# Patient Record
Sex: FEMALE | Race: WHITE | NOT HISPANIC OR LATINO | Employment: OTHER | URBAN - METROPOLITAN AREA
[De-identification: names, ages, dates, MRNs, and addresses within clinical notes are randomized per-mention and may not be internally consistent; named-entity substitution may affect disease eponyms.]

---

## 2017-02-02 ENCOUNTER — OFFICE VISIT (OUTPATIENT)
Dept: FAMILY MEDICINE CLINIC | Facility: CLINIC | Age: 62
End: 2017-02-02

## 2017-02-02 VITALS
OXYGEN SATURATION: 97 % | HEART RATE: 66 BPM | TEMPERATURE: 98.3 F | HEIGHT: 69 IN | BODY MASS INDEX: 26.96 KG/M2 | DIASTOLIC BLOOD PRESSURE: 74 MMHG | RESPIRATION RATE: 16 BRPM | WEIGHT: 182 LBS | SYSTOLIC BLOOD PRESSURE: 120 MMHG

## 2017-02-02 DIAGNOSIS — J32.0 MAXILLARY SINUSITIS, UNSPECIFIED CHRONICITY: Primary | ICD-10-CM

## 2017-02-02 PROCEDURE — 99213 OFFICE O/P EST LOW 20 MIN: CPT | Performed by: INTERNAL MEDICINE

## 2017-02-02 RX ORDER — AZITHROMYCIN 250 MG/1
TABLET, FILM COATED ORAL
Qty: 6 TABLET | Refills: 0 | Status: SHIPPED | OUTPATIENT
Start: 2017-02-02 | End: 2017-05-17

## 2017-02-03 ENCOUNTER — TELEPHONE (OUTPATIENT)
Dept: FAMILY MEDICINE CLINIC | Facility: CLINIC | Age: 62
End: 2017-02-03

## 2017-02-03 NOTE — TELEPHONE ENCOUNTER
Spoke to patient and informed her that she could come and  a sample of Asmanex.  She expressed understanding and will come by and  the inhaler today   ----- Message from Erika Rob sent at 2/3/2017 12:27 PM EST -----  Contact: PT  [phone]  PT SAW DR DING YESTERDAY FOR BRONCHITIS AND HE ASK HER ABOUT AND INHALER AND SHE DECLINED BUT NOW SAYS IS HAVING SOME WHEEZING AND WOULD LIKE TO GET AN INHALER       [facility] [address]    CALL AND LET HER KNOW [facility] HAS BEEN DONE

## 2017-02-07 PROBLEM — J32.0 MAXILLARY SINUSITIS: Status: ACTIVE | Noted: 2017-02-07

## 2017-02-07 NOTE — PROGRESS NOTES
Subjective   Juli Manzanares is a 61 y.o. female. Patient is here today for   Chief Complaint   Patient presents with   • Sinus Problem     sinus congestion and cough           Vitals:    02/02/17 1303   BP: 120/74   Pulse: 66   Resp: 16   Temp: 98.3 °F (36.8 °C)   SpO2: 97%       Past Medical History   Diagnosis Date   • Arthritis    • Asthma       Allergies   Allergen Reactions   • Bactrim [Sulfamethoxazole-Trimethoprim]    • Carbocaine [Mepivacaine Hcl]    • Novocain [Procaine]    • Penicillins       Social History     Social History   • Marital status:      Spouse name: N/A   • Number of children: N/A   • Years of education: N/A     Occupational History   • Not on file.     Social History Main Topics   • Smoking status: Never Smoker   • Smokeless tobacco: Not on file   • Alcohol use Yes   • Drug use: Not on file   • Sexual activity: Not on file     Other Topics Concern   • Not on file     Social History Narrative        Current Outpatient Prescriptions:   •  calcium citrate-vitamin d (CITRACAL) 200-250 MG-UNIT tablet tablet, Take  by mouth Daily., Disp: , Rfl:   •  Cholecalciferol (VITAMIN D3) 5000 UNITS capsule capsule, Take 5,000 Units by mouth Daily., Disp: , Rfl:   •  Multiple Vitamins-Minerals (MULTI FOR HER 50+ PO), Take  by mouth., Disp: , Rfl:   •  azithromycin (ZITHROMAX) 250 MG tablet, Take 2 tablets the first day, then 1 tablet daily for 4 days., Disp: 6 tablet, Rfl: 0     Objective     HPI Comments: She has had tender maxillary sinus pain and purulent nasal drainage for 10 days or so now.    She denies having any fevers or chills.    When this first started, approximately 10 days ago, she had a sore throat.    She denies any dyspnea or squeaking or wheezes.    Sinus Problem   Pertinent negatives include no ear pain.        Review of Systems   Constitutional: Negative.    HENT: Positive for postnasal drip. Negative for ear pain.         He has bilateral maxillary sinus tenderness and pain on  palpation.  The right is worse than the left.   Respiratory: Negative.    Cardiovascular: Negative.    Psychiatric/Behavioral: Negative.        Physical Exam   Constitutional: She is oriented to person, place, and time. She appears well-developed and well-nourished.   HENT:   Head: Normocephalic and atraumatic.   She has a little pain on palpation of the right maxillary sinus.   Pulmonary/Chest: Effort normal and breath sounds normal. No respiratory distress. She has no wheezes. She has no rales.   Neurological: She is alert and oriented to person, place, and time.   Psychiatric: She has a normal mood and affect. Her behavior is normal.   Nursing note and vitals reviewed.        Problem List Items Addressed This Visit        Respiratory    Maxillary sinusitis - Primary        I gave her Zithromax Z-Odin.  I asked her to take over-the-counter decongestion when necessary.    I asked her let me know if she failed to improve.    PLAN    No Follow-up on file.

## 2017-03-08 ENCOUNTER — APPOINTMENT (OUTPATIENT)
Dept: WOMENS IMAGING | Facility: HOSPITAL | Age: 62
End: 2017-03-08

## 2017-03-08 PROCEDURE — 77067 SCR MAMMO BI INCL CAD: CPT | Performed by: RADIOLOGY

## 2017-03-08 PROCEDURE — 77063 BREAST TOMOSYNTHESIS BI: CPT | Performed by: RADIOLOGY

## 2017-05-03 DIAGNOSIS — Z00.00 ROUTINE HEALTH MAINTENANCE: Primary | ICD-10-CM

## 2017-05-10 ENCOUNTER — CLINICAL SUPPORT (OUTPATIENT)
Dept: FAMILY MEDICINE CLINIC | Facility: CLINIC | Age: 62
End: 2017-05-10

## 2017-05-10 DIAGNOSIS — Z00.00 ROUTINE HEALTH MAINTENANCE: Primary | ICD-10-CM

## 2017-05-10 PROCEDURE — 85025 COMPLETE CBC W/AUTO DIFF WBC: CPT | Performed by: INTERNAL MEDICINE

## 2017-05-10 PROCEDURE — 71020 XR CHEST PA AND LATERAL: CPT | Performed by: INTERNAL MEDICINE

## 2017-05-10 PROCEDURE — 93000 ELECTROCARDIOGRAM COMPLETE: CPT | Performed by: INTERNAL MEDICINE

## 2017-05-11 LAB
ALBUMIN SERPL-MCNC: 4.8 G/DL (ref 3.5–5.2)
ALBUMIN/GLOB SERPL: 2.2 G/DL
ALP SERPL-CCNC: 67 U/L (ref 39–117)
ALT SERPL-CCNC: 20 U/L (ref 1–33)
APPEARANCE UR: CLEAR
AST SERPL-CCNC: 27 U/L (ref 1–32)
BILIRUB SERPL-MCNC: 0.9 MG/DL (ref 0.1–1.2)
BILIRUB UR QL STRIP: NEGATIVE
BUN SERPL-MCNC: 25 MG/DL (ref 8–23)
BUN/CREAT SERPL: 23.6 (ref 7–25)
CALCIUM SERPL-MCNC: 9.9 MG/DL (ref 8.6–10.5)
CHLORIDE SERPL-SCNC: 99 MMOL/L (ref 98–107)
CHOLEST SERPL-MCNC: 242 MG/DL (ref 0–200)
CO2 SERPL-SCNC: 27.1 MMOL/L (ref 22–29)
COLOR UR: YELLOW
CREAT SERPL-MCNC: 1.06 MG/DL (ref 0.57–1)
GLOBULIN SER CALC-MCNC: 2.2 GM/DL
GLUCOSE SERPL-MCNC: 86 MG/DL (ref 65–99)
GLUCOSE UR QL: NEGATIVE
HDLC SERPL-MCNC: 79 MG/DL (ref 40–60)
HGB UR QL STRIP: NEGATIVE
KETONES UR QL STRIP: NEGATIVE
LDLC SERPL CALC-MCNC: 149 MG/DL (ref 0–100)
LDLC/HDLC SERPL: 1.89 {RATIO}
LEUKOCYTE ESTERASE UR QL STRIP: NEGATIVE
NITRITE UR QL STRIP: NEGATIVE
PH UR STRIP: 7 [PH] (ref 5–8)
POTASSIUM SERPL-SCNC: 4.9 MMOL/L (ref 3.5–5.2)
PROT SERPL-MCNC: 7 G/DL (ref 6–8.5)
PROT UR QL STRIP: NEGATIVE
SODIUM SERPL-SCNC: 142 MMOL/L (ref 136–145)
SP GR UR: 1.01 (ref 1–1.03)
TRIGL SERPL-MCNC: 69 MG/DL (ref 0–150)
UROBILINOGEN UR STRIP-MCNC: NORMAL MG/DL
VLDLC SERPL CALC-MCNC: 13.8 MG/DL (ref 5–40)

## 2017-05-17 ENCOUNTER — OFFICE VISIT (OUTPATIENT)
Dept: FAMILY MEDICINE CLINIC | Facility: CLINIC | Age: 62
End: 2017-05-17

## 2017-05-17 VITALS
RESPIRATION RATE: 16 BRPM | HEART RATE: 67 BPM | DIASTOLIC BLOOD PRESSURE: 80 MMHG | SYSTOLIC BLOOD PRESSURE: 130 MMHG | HEIGHT: 69 IN | TEMPERATURE: 98 F | BODY MASS INDEX: 27.85 KG/M2 | OXYGEN SATURATION: 98 % | WEIGHT: 188 LBS

## 2017-05-17 DIAGNOSIS — Z12.11 ENCOUNTER FOR SCREENING COLONOSCOPY: Primary | ICD-10-CM

## 2017-05-17 DIAGNOSIS — Z00.00 WELL ADULT EXAM: ICD-10-CM

## 2017-05-17 PROCEDURE — 99396 PREV VISIT EST AGE 40-64: CPT | Performed by: INTERNAL MEDICINE

## 2017-05-17 PROCEDURE — 71020 XR CHEST PA AND LATERAL: CPT | Performed by: INTERNAL MEDICINE

## 2017-05-17 RX ORDER — MOMETASONE FUROATE 1 MG/G
CREAM TOPICAL
Refills: 0 | Status: ON HOLD | COMMUNITY
Start: 2017-02-22 | End: 2017-07-12

## 2017-05-22 ENCOUNTER — TRANSCRIBE ORDERS (OUTPATIENT)
Dept: ADMINISTRATIVE | Facility: HOSPITAL | Age: 62
End: 2017-05-22

## 2017-05-22 DIAGNOSIS — Z13.9 SCREENING: Primary | ICD-10-CM

## 2017-05-25 ENCOUNTER — HOSPITAL ENCOUNTER (OUTPATIENT)
Dept: CARDIOLOGY | Facility: HOSPITAL | Age: 62
Discharge: HOME | End: 2017-05-25
Admitting: INTERNAL MEDICINE

## 2017-05-25 VITALS
HEART RATE: 60 BPM | WEIGHT: 187 LBS | SYSTOLIC BLOOD PRESSURE: 117 MMHG | HEIGHT: 69 IN | BODY MASS INDEX: 27.7 KG/M2 | DIASTOLIC BLOOD PRESSURE: 69 MMHG

## 2017-05-25 DIAGNOSIS — Z13.9 SCREENING: ICD-10-CM

## 2017-05-25 LAB
BH CV ECHO MEAS - DIST AO DIAM: 1.53 CM
BH CV VAS BP LEFT ARM: NORMAL MMHG
BH CV VAS BP RIGHT ARM: NORMAL MMHG
BH CV XLRA MEAS - MID AO DIAM: 1.72 CM
BH CV XLRA MEAS - PAD LEFT ABI DP: 1.21
BH CV XLRA MEAS - PAD LEFT ABI PT: 1.19
BH CV XLRA MEAS - PAD LEFT ARM: 115 MMHG
BH CV XLRA MEAS - PAD LEFT LEG DP: 142 MMHG
BH CV XLRA MEAS - PAD LEFT LEG PT: 140 MMHG
BH CV XLRA MEAS - PAD RIGHT ABI DP: 1.16
BH CV XLRA MEAS - PAD RIGHT ABI PT: 1.19
BH CV XLRA MEAS - PAD RIGHT ARM: 117 MMHG
BH CV XLRA MEAS - PAD RIGHT LEG DP: 136 MMHG
BH CV XLRA MEAS - PAD RIGHT LEG PT: 140 MMHG
BH CV XLRA MEAS - PROX AO DIAM: 2.08 CM
BH CV XLRA MEAS LEFT ICA/CCA RATIO: 1.18
BH CV XLRA MEAS LEFT MID CCA PSV: NORMAL CM/SEC
BH CV XLRA MEAS LEFT MID ICA PSV: NORMAL CM/SEC
BH CV XLRA MEAS LEFT PROX ECA PSV: NORMAL CM/SEC
BH CV XLRA MEAS RIGHT ICA/CCA RATIO: 1.07
BH CV XLRA MEAS RIGHT MID CCA PSV: NORMAL CM/SEC
BH CV XLRA MEAS RIGHT MID ICA PSV: NORMAL CM/SEC
BH CV XLRA MEAS RIGHT PROX ECA PSV: NORMAL CM/SEC

## 2017-05-25 PROCEDURE — 93799 UNLISTED CV SVC/PROCEDURE: CPT

## 2017-06-06 PROBLEM — Z00.00 WELL ADULT EXAM: Status: ACTIVE | Noted: 2017-06-06

## 2017-06-06 NOTE — PROGRESS NOTES
Subjective   Juli Manzanares is a 62 y.o. female. Patient is here today for   Chief Complaint   Patient presents with   • Annual Exam     physical- pt has OBGYN           Vitals:    05/17/17 1042   BP: 130/80   Pulse: 67   Resp: 16   Temp: 98 °F (36.7 °C)   SpO2: 98%       The following portions of the patient's history were reviewed and updated as appropriate: allergies, current medications, past family history, past medical history, past social history, past surgical history and problem list.    Past Medical History:   Diagnosis Date   • Arthritis    • Asthma       Allergies   Allergen Reactions   • Bactrim [Sulfamethoxazole-Trimethoprim]    • Carbocaine [Mepivacaine Hcl]    • Novocain [Procaine]    • Penicillins       Social History     Social History   • Marital status:      Spouse name: N/A   • Number of children: N/A   • Years of education: N/A     Occupational History   • Not on file.     Social History Main Topics   • Smoking status: Never Smoker   • Smokeless tobacco: Not on file   • Alcohol use Yes   • Drug use: Not on file   • Sexual activity: Not on file     Other Topics Concern   • Not on file     Social History Narrative        Current Outpatient Prescriptions:   •  calcium citrate-vitamin d (CITRACAL) 200-250 MG-UNIT tablet tablet, Take  by mouth Daily., Disp: , Rfl:   •  Cholecalciferol (VITAMIN D3) 5000 UNITS capsule capsule, Take 5,000 Units by mouth Daily., Disp: , Rfl:   •  Loratadine (CLARITIN PO), Take  by mouth., Disp: , Rfl:   •  mometasone (ELOCON) 0.1 % cream, APPLY TO AFFECTED AREA OF FACE FOR TWO WEEKS AT A TIME, Disp: , Rfl: 0  •  Multiple Vitamins-Minerals (MULTI FOR HER 50+ PO), Take  by mouth., Disp: , Rfl:      EKG  ECG Report  Electrocardiogram was normal without any ST or T wave changes suggestive of ischemia and a normal sinus rhythm.    PA and lateral chest x-ray showed no acute or chronic disease.  Objective   HPI Comments: This pleasant lady is here for a comprehensive  physical exam.  She has no complaints.  She is gynecologist that she sees on a regular basis for pelvic Pap exam etc.     Juli Manzanares 62 y.o. female who presents for an Annual Wellness Visit.  she has a history of   Patient Active Problem List   Diagnosis   • Female pattern hair loss   • Maxillary sinusitis   • Well adult exam   .  she has been doing well with new interval problems.  Labs results discussed in detail with the patient.  Plan to update vaccines if needed today.        Lab Results (most recent)     None            Review of Systems   Constitutional: Negative.    HENT: Negative.    Eyes: Negative.    Respiratory: Negative.    Cardiovascular: Negative.    Gastrointestinal: Negative.    Endocrine: Negative.    Genitourinary: Negative.    Musculoskeletal: Negative.    Skin: Negative.    Allergic/Immunologic: Negative.    Neurological: Negative.    Hematological: Negative.    Psychiatric/Behavioral: Negative.        Physical Exam   Constitutional: She is oriented to person, place, and time. She appears well-developed and well-nourished. No distress.   Pleasant, neatly groomed, BMI 27.   HENT:   Head: Normocephalic and atraumatic.   Right Ear: External ear normal.   Left Ear: External ear normal.   Nose: Nose normal.   Mouth/Throat: Oropharynx is clear and moist.   Eyes: Conjunctivae and EOM are normal. Pupils are equal, round, and reactive to light. Right eye exhibits no discharge. Left eye exhibits no discharge. No scleral icterus.   Neck: Normal range of motion. Neck supple. No JVD present. No tracheal deviation present. No thyromegaly present.   Cardiovascular: Normal rate, regular rhythm, normal heart sounds and intact distal pulses.  Exam reveals no gallop and no friction rub.    No murmur heard.  Pulmonary/Chest: Effort normal and breath sounds normal. No stridor. No respiratory distress. She has no wheezes. She has no rales. She exhibits no tenderness.   Abdominal: Soft. Bowel sounds are normal. She  exhibits no distension and no mass. There is no tenderness. There is no rebound and no guarding. No hernia.   Genitourinary:   Genitourinary Comments: Deferred to gynecology.   Musculoskeletal: Normal range of motion. She exhibits no edema, tenderness or deformity.   Lymphadenopathy:     She has no cervical adenopathy.   Neurological: She is alert and oriented to person, place, and time. She has normal reflexes. She displays normal reflexes. No cranial nerve deficit. She exhibits normal muscle tone. Coordination normal.   Skin: Skin is warm and dry. No rash noted. She is not diaphoretic. No pallor.   She has some diffuse female pattern hair loss on the scalp.   Psychiatric: She has a normal mood and affect. Her behavior is normal. Judgment and thought content normal.   Nursing note and vitals reviewed.      ASSESSMENT       Problem List Items Addressed This Visit        Other    Well adult exam    Relevant Orders    XR Chest PA & Lateral (Completed)      Other Visit Diagnoses     Encounter for screening colonoscopy    -  Primary    Relevant Orders    Ambulatory Referral to Gastroenterology          PLAN  She enjoys excellent health.    She is a little bit overdue for screening colonoscopy and I have referred her out for that today.    I like to have her back in one year for another comprehensive physical exam.  There are no Patient Instructions on file for this visit.    No Follow-up on file.

## 2017-06-23 DIAGNOSIS — Z83.71 FAMILY HISTORY OF POLYPS IN THE COLON: Primary | ICD-10-CM

## 2017-06-23 RX ORDER — SODIUM CHLORIDE 0.9 % (FLUSH) 0.9 %
1-10 SYRINGE (ML) INJECTION AS NEEDED
Status: CANCELLED | OUTPATIENT
Start: 2017-06-23

## 2017-06-23 RX ORDER — SODIUM CHLORIDE, SODIUM LACTATE, POTASSIUM CHLORIDE, CALCIUM CHLORIDE 600; 310; 30; 20 MG/100ML; MG/100ML; MG/100ML; MG/100ML
30 INJECTION, SOLUTION INTRAVENOUS CONTINUOUS
Status: CANCELLED | OUTPATIENT
Start: 2017-06-23

## 2017-07-12 ENCOUNTER — HOSPITAL ENCOUNTER (OUTPATIENT)
Facility: HOSPITAL | Age: 62
Setting detail: HOSPITAL OUTPATIENT SURGERY
Discharge: HOME | End: 2017-07-12
Attending: INTERNAL MEDICINE | Admitting: INTERNAL MEDICINE

## 2017-07-12 ENCOUNTER — ANESTHESIA (OUTPATIENT)
Dept: GASTROENTEROLOGY | Facility: HOSPITAL | Age: 62
End: 2017-07-12

## 2017-07-12 ENCOUNTER — ANESTHESIA EVENT (OUTPATIENT)
Dept: GASTROENTEROLOGY | Facility: HOSPITAL | Age: 62
End: 2017-07-12

## 2017-07-12 VITALS
OXYGEN SATURATION: 99 % | WEIGHT: 183 LBS | BODY MASS INDEX: 27.11 KG/M2 | HEART RATE: 61 BPM | DIASTOLIC BLOOD PRESSURE: 71 MMHG | TEMPERATURE: 98.2 F | HEIGHT: 69 IN | SYSTOLIC BLOOD PRESSURE: 114 MMHG | RESPIRATION RATE: 14 BRPM

## 2017-07-12 DIAGNOSIS — Z83.71 FAMILY HISTORY OF POLYPS IN THE COLON: ICD-10-CM

## 2017-07-12 PROCEDURE — 45378 DIAGNOSTIC COLONOSCOPY: CPT | Performed by: INTERNAL MEDICINE

## 2017-07-12 PROCEDURE — 25010000002 PROPOFOL 10 MG/ML EMULSION: Performed by: ANESTHESIOLOGY

## 2017-07-12 RX ORDER — SODIUM CHLORIDE 0.9 % (FLUSH) 0.9 %
1-10 SYRINGE (ML) INJECTION AS NEEDED
Status: DISCONTINUED | OUTPATIENT
Start: 2017-07-12 | End: 2017-07-12 | Stop reason: HOSPADM

## 2017-07-12 RX ORDER — SODIUM CHLORIDE, SODIUM LACTATE, POTASSIUM CHLORIDE, CALCIUM CHLORIDE 600; 310; 30; 20 MG/100ML; MG/100ML; MG/100ML; MG/100ML
30 INJECTION, SOLUTION INTRAVENOUS CONTINUOUS
Status: DISCONTINUED | OUTPATIENT
Start: 2017-07-12 | End: 2017-07-12 | Stop reason: HOSPADM

## 2017-07-12 RX ORDER — PROPOFOL 10 MG/ML
VIAL (ML) INTRAVENOUS CONTINUOUS PRN
Status: DISCONTINUED | OUTPATIENT
Start: 2017-07-12 | End: 2017-07-12 | Stop reason: SURG

## 2017-07-12 RX ORDER — PROPOFOL 10 MG/ML
VIAL (ML) INTRAVENOUS AS NEEDED
Status: DISCONTINUED | OUTPATIENT
Start: 2017-07-12 | End: 2017-07-12 | Stop reason: SURG

## 2017-07-12 RX ADMIN — EPHEDRINE SULFATE 10 MG: 50 INJECTION INTRAMUSCULAR; INTRAVENOUS; SUBCUTANEOUS at 07:57

## 2017-07-12 RX ADMIN — PROPOFOL 50 MG: 10 INJECTION, EMULSION INTRAVENOUS at 07:39

## 2017-07-12 RX ADMIN — SODIUM CHLORIDE, POTASSIUM CHLORIDE, SODIUM LACTATE AND CALCIUM CHLORIDE: 600; 310; 30; 20 INJECTION, SOLUTION INTRAVENOUS at 07:28

## 2017-07-12 RX ADMIN — PROPOFOL 150 MG: 10 INJECTION, EMULSION INTRAVENOUS at 07:32

## 2017-07-12 RX ADMIN — PROPOFOL 300 MCG/KG/MIN: 10 INJECTION, EMULSION INTRAVENOUS at 07:32

## 2017-07-12 RX ADMIN — SODIUM CHLORIDE, POTASSIUM CHLORIDE, SODIUM LACTATE AND CALCIUM CHLORIDE 30 ML/HR: 600; 310; 30; 20 INJECTION, SOLUTION INTRAVENOUS at 07:10

## 2017-07-12 NOTE — H&P
"[facility]  Pre Procedure History & Physical    Chief Complaint:   Family history of colon polyps    HPI:   62W with PMH as below here for her second colonoscopy.  Last one was 2006 and normal.  Family history of polyps in her father, no GI malignancies.  No blood in her stool, reports a hemorrhoid.  No changes in her bowel habits.  Past Medical History:   Past Medical History:   Diagnosis Date   • Arthritis    • Asthma    • H/O colonoscopy    • Hemorrhoids        Family History:  Family History   Problem Relation Age of Onset   • Cancer Mother    • Hypertension Father    • Diabetes Father    • Kidney disease Father        Social History:   reports that she has never smoked. She does not have any smokeless tobacco history on file. She reports that she drinks alcohol.    Medications:   Prescriptions Prior to Admission   Medication Sig Dispense Refill Last Dose   • calcium citrate-vitamin d (CITRACAL) 200-250 MG-UNIT tablet tablet Take  by mouth Daily.   7/8/2017   • Cholecalciferol (VITAMIN D3) 5000 UNITS capsule capsule Take 5,000 Units by mouth Daily.   7/8/2017   • Loratadine (CLARITIN PO) Take  by mouth.   7/7/2017   • Multiple Vitamins-Minerals (MULTI FOR HER 50+ PO) Take  by mouth.   7/8/2017       Allergies:  Bactrim [sulfamethoxazole-trimethoprim]; Carbocaine [mepivacaine hcl]; Novocain [procaine]; Penicillins; and Latex    ROS:    Pertinent items are noted in HPI     Objective     Blood pressure 131/66, pulse 77, temperature 98.2 °F (36.8 °C), temperature source Oral, resp. rate 16, height 68.5\" (174 cm), weight 183 lb (83 kg), SpO2 98 %.    Physical Exam   Constitutional: Pt is oriented to person, place, and time and well-developed, well-nourished, and in no distress.   HENT:   Mouth/Throat: Oropharynx is clear and moist.   Neck: Normal range of motion. Neck supple.   Cardiovascular: Normal rate, regular rhythm and normal heart sounds.    Pulmonary/Chest: Effort normal and breath " sounds normal. No respiratory distress. No  wheezes.   Abdominal: Soft. Bowel sounds are normal.   Skin: Skin is warm and dry.   Psychiatric: Mood, memory, affect and judgment normal.     Assessment/Plan     Diagnosis:  Family history of colon polyps    Anticipated Surgical Procedure:    Colonoscopy    The risks, benefits, and alternatives of this procedure have been discussed with the patient or the responsible party- the patient understands and agrees to proceed.

## 2017-07-12 NOTE — ANESTHESIA POSTPROCEDURE EVALUATION
Patient: Juli Manzanares    Procedure Summary     Date Anesthesia Start Anesthesia Stop Room / Location    07/12/17 0728 [id number]  [facility] /  [facility]       Procedure Diagnosis Surgeon Provider    COLONOSCOPY into cecum (N/A ) Family history of polyps in the colon  (Family history of polyps in the colon [Z83.71]) MD Vu Powell MD          Anesthesia Type: MAC  Last vitals  BP      Temp      Pulse     Resp      SpO2        Post Anesthesia Care and Evaluation    Patient location during evaluation: PHASE II  Patient participation: complete - patient participated  Level of consciousness: awake and alert  Pain management: adequate  Airway patency: patent  Anesthetic complications: No anesthetic complications  PONV Status: none  Cardiovascular status: acceptable  Respiratory status: acceptable  Hydration status: acceptable

## 2017-07-12 NOTE — ANESTHESIA PREPROCEDURE EVALUATION
Anesthesia Evaluation     Patient summary reviewed and Nursing notes reviewed          Airway   Mallampati: II  TM distance: >3 FB  Neck ROM: full  no difficulty expected  Dental - normal exam     Pulmonary - normal exam   (+) asthma,   Cardiovascular - negative cardio ROS and normal exam        Neuro/Psych- negative ROS  GI/Hepatic/Renal/Endo - negative ROS     Musculoskeletal     Abdominal  - normal exam   Substance History - negative use     OB/GYN negative ob/gyn ROS         Other   (+) arthritis                                     Anesthesia Plan    ASA 2     MAC     intravenous induction   Anesthetic plan and risks discussed with patient.

## 2017-11-08 DIAGNOSIS — E78.5 HYPERLIPIDEMIA, UNSPECIFIED HYPERLIPIDEMIA TYPE: Primary | ICD-10-CM

## 2017-11-14 LAB
ALBUMIN SERPL-MCNC: 4.6 G/DL (ref 3.5–5.2)
ALBUMIN/GLOB SERPL: 2.3 G/DL
ALP SERPL-CCNC: 66 U/L (ref 39–117)
ALT SERPL-CCNC: 21 U/L (ref 1–33)
APPEARANCE UR: CLEAR
AST SERPL-CCNC: 29 U/L (ref 1–32)
BACTERIA #/AREA URNS HPF: NORMAL /HPF
BILIRUB SERPL-MCNC: 0.7 MG/DL (ref 0.1–1.2)
BILIRUB UR QL STRIP: NEGATIVE
BUN SERPL-MCNC: 21 MG/DL (ref 8–23)
BUN/CREAT SERPL: 20.2 (ref 7–25)
CALCIUM SERPL-MCNC: 9.7 MG/DL (ref 8.6–10.5)
CASTS URNS MICRO: NORMAL
CHLORIDE SERPL-SCNC: 100 MMOL/L (ref 98–107)
CHOLEST SERPL-MCNC: 204 MG/DL (ref 0–200)
CO2 SERPL-SCNC: 26.8 MMOL/L (ref 22–29)
COLOR UR: YELLOW
CREAT SERPL-MCNC: 1.04 MG/DL (ref 0.57–1)
EPI CELLS #/AREA URNS HPF: NORMAL /HPF
GFR SERPLBLD CREATININE-BSD FMLA CKD-EPI: 53 ML/MIN/1.73
GFR SERPLBLD CREATININE-BSD FMLA CKD-EPI: 65 ML/MIN/1.73
GLOBULIN SER CALC-MCNC: 2 GM/DL
GLUCOSE SERPL-MCNC: 90 MG/DL (ref 65–99)
GLUCOSE UR QL: NEGATIVE
HDLC SERPL-MCNC: 65 MG/DL (ref 40–60)
HGB UR QL STRIP: NEGATIVE
KETONES UR QL STRIP: NEGATIVE
LDLC SERPL CALC-MCNC: 125 MG/DL (ref 0–100)
LDLC/HDLC SERPL: 1.93 {RATIO}
LEUKOCYTE ESTERASE UR QL STRIP: NEGATIVE
NITRITE UR QL STRIP: NEGATIVE
PH UR STRIP: 7 [PH] (ref 5–8)
POTASSIUM SERPL-SCNC: 4.5 MMOL/L (ref 3.5–5.2)
PROT SERPL-MCNC: 6.6 G/DL (ref 6–8.5)
PROT UR QL STRIP: NEGATIVE
RBC #/AREA URNS HPF: NORMAL /HPF
SODIUM SERPL-SCNC: 140 MMOL/L (ref 136–145)
SP GR UR: 1.01 (ref 1–1.03)
TRIGL SERPL-MCNC: 68 MG/DL (ref 0–150)
UROBILINOGEN UR STRIP-MCNC: (no result) MG/DL
VLDLC SERPL CALC-MCNC: 13.6 MG/DL (ref 5–40)
WBC #/AREA URNS HPF: NORMAL /HPF

## 2017-11-28 ENCOUNTER — OFFICE VISIT (OUTPATIENT)
Dept: FAMILY MEDICINE CLINIC | Facility: CLINIC | Age: 62
End: 2017-11-28

## 2017-11-28 VITALS
BODY MASS INDEX: 27.25 KG/M2 | SYSTOLIC BLOOD PRESSURE: 126 MMHG | HEIGHT: 69 IN | HEART RATE: 75 BPM | WEIGHT: 184 LBS | DIASTOLIC BLOOD PRESSURE: 80 MMHG | RESPIRATION RATE: 16 BRPM

## 2017-11-28 DIAGNOSIS — M54.50 CHRONIC BILATERAL LOW BACK PAIN WITHOUT SCIATICA: Primary | ICD-10-CM

## 2017-11-28 DIAGNOSIS — G89.29 CHRONIC BILATERAL LOW BACK PAIN WITHOUT SCIATICA: Primary | ICD-10-CM

## 2017-11-28 PROCEDURE — 99213 OFFICE O/P EST LOW 20 MIN: CPT | Performed by: INTERNAL MEDICINE

## 2017-11-28 NOTE — PROGRESS NOTES
Subjective   Juli Manzanares is a 62 y.o. female. Patient is here today for   Chief Complaint   Patient presents with   • Hyperlipidemia          Vitals:    11/28/17 0932   BP: 126/80   Pulse: 75   Resp: 16       Past Medical History:   Diagnosis Date   • Arthritis    • Asthma    • H/O colonoscopy    • Hemorrhoids       Allergies   Allergen Reactions   • Bactrim [Sulfamethoxazole-Trimethoprim]    • Carbocaine [Mepivacaine Hcl]    • Novocain [Procaine]    • Penicillins    • Latex Rash      Social History     Social History   • Marital status:      Spouse name: N/A   • Number of children: N/A   • Years of education: N/A     Occupational History   • Not on file.     Social History Main Topics   • Smoking status: Never Smoker   • Smokeless tobacco: Not on file   • Alcohol use Yes      Comment: rarely   • Drug use: Not on file   • Sexual activity: Not on file     Other Topics Concern   • Not on file     Social History Narrative        Current Outpatient Prescriptions:   •  calcium citrate-vitamin d (CITRACAL) 200-250 MG-UNIT tablet tablet, Take  by mouth Daily., Disp: , Rfl:   •  Cholecalciferol (VITAMIN D3) 5000 UNITS capsule capsule, Take 5,000 Units by mouth Daily., Disp: , Rfl:   •  Loratadine (CLARITIN PO), Take  by mouth., Disp: , Rfl:   •  Multiple Vitamins-Minerals (MULTI FOR HER 50+ PO), Take  by mouth., Disp: , Rfl:      Objective     HPI Comments: She is here to follow-up her hypercholesterolemia which she has been managing with judicious changes in her diet to reduce her level of fat intake.  She is eating more vegetables as well.    She tells me that she feels well.    She wonders what blood type she has.  I think that would be worth knowing.    She has had back pain for many years.  She is to see Dr. Em intermittently for this issue.  She is going to the gym 3 times a week.  The pain that she has and her back is in the sacral area and low lumbar area.  This pain is bilateral.  There is no  radiation of this pain.  Going up stairs makes it worse.       Review of Systems   Constitutional: Negative.    HENT: Negative.    Cardiovascular: Negative.    Musculoskeletal:        He complains of back pain as described in the history of present illness   Psychiatric/Behavioral: Negative.        Physical Exam   Constitutional: She is oriented to person, place, and time. She appears well-developed.   Pleasant, neatly groomed, BMI 27.   HENT:   Head: Normocephalic and atraumatic.   Pulmonary/Chest: Effort normal.   Musculoskeletal:   She has bilateral lumbosacral spine pain.  There is a little bit of pain on palpation of these areas.   Neurological: She is alert and oriented to person, place, and time.   Psychiatric: She has a normal mood and affect. Her behavior is normal.   Nursing note and vitals reviewed.        Problem List Items Addressed This Visit        Nervous and Auditory    Chronic low back pain - Primary    Relevant Orders    XR Spine Lumbar 4+ View            PLAN    I'm going to get a lumbar spine x-ray regarding her chronic lumbosacral spine pain.  That reveals no contraindication, she may benefit from a physical therapy evaluation and treatment.    She has had elevated cholesterol past but not now since she is made changes in her diet.    She ought follow-up for a comprehensive physical examination once yearly.  Check blood type next time  No Follow-up on file.

## 2017-11-29 ENCOUNTER — TELEPHONE (OUTPATIENT)
Dept: FAMILY MEDICINE CLINIC | Facility: CLINIC | Age: 62
End: 2017-11-29

## 2017-11-29 NOTE — TELEPHONE ENCOUNTER
Spoke to patient and informed her that if she would like she could schedule to have a physical done in May since that was when she had her last one   ----- Message from Liliya Bullock MA sent at 11/28/2017  9:53 AM EST -----  Contact: PT  PT WANTS TO KNOW IF SHE NEEDS TO DO A FULL PHYSICAL IN A YEAR OR DO 6 MONTH LABS AND A FU APPT PT CAN BE REACHED -[phone]

## 2017-12-19 ENCOUNTER — HOSPITAL ENCOUNTER (OUTPATIENT)
Dept: GENERAL RADIOLOGY | Facility: HOSPITAL | Age: 62
Discharge: HOME | End: 2017-12-19
Admitting: INTERNAL MEDICINE

## 2017-12-19 PROCEDURE — 72110 X-RAY EXAM L-2 SPINE 4/>VWS: CPT

## 2018-03-21 ENCOUNTER — APPOINTMENT (OUTPATIENT)
Dept: WOMENS IMAGING | Facility: HOSPITAL | Age: 63
End: 2018-03-21

## 2018-03-21 PROCEDURE — 77067 SCR MAMMO BI INCL CAD: CPT | Performed by: RADIOLOGY

## 2018-03-21 PROCEDURE — 77063 BREAST TOMOSYNTHESIS BI: CPT | Performed by: RADIOLOGY

## 2018-04-30 ENCOUNTER — OFFICE VISIT (OUTPATIENT)
Dept: FAMILY MEDICINE CLINIC | Facility: CLINIC | Age: 63
End: 2018-04-30

## 2018-04-30 VITALS
DIASTOLIC BLOOD PRESSURE: 80 MMHG | WEIGHT: 182.2 LBS | TEMPERATURE: 98.5 F | HEART RATE: 67 BPM | BODY MASS INDEX: 26.98 KG/M2 | HEIGHT: 69 IN | OXYGEN SATURATION: 100 % | SYSTOLIC BLOOD PRESSURE: 112 MMHG | RESPIRATION RATE: 16 BRPM

## 2018-04-30 DIAGNOSIS — G89.29 CHRONIC BILATERAL LOW BACK PAIN WITHOUT SCIATICA: Primary | ICD-10-CM

## 2018-04-30 DIAGNOSIS — M54.50 CHRONIC BILATERAL LOW BACK PAIN WITHOUT SCIATICA: Primary | ICD-10-CM

## 2018-04-30 PROCEDURE — 99213 OFFICE O/P EST LOW 20 MIN: CPT | Performed by: INTERNAL MEDICINE

## 2018-04-30 NOTE — PROGRESS NOTES
Subjective   Juli Manzanares is a 62 y.o. female. Patient is here today for   Chief Complaint   Patient presents with   • Follow-up     xr  spine           Vitals:    04/30/18 0913   BP: 112/80   Pulse: 67   Resp: 16   Temp: 98.5 °F (36.9 °C)   SpO2: 100%       Past Medical History:   Diagnosis Date   • Arthritis    • Asthma    • H/O colonoscopy    • Hemorrhoids       Allergies   Allergen Reactions   • Bactrim [Sulfamethoxazole-Trimethoprim]    • Carbocaine [Mepivacaine Hcl]    • Novocain [Procaine]    • Penicillins    • Latex Rash      Social History     Social History   • Marital status:      Spouse name: N/A   • Number of children: N/A   • Years of education: N/A     Occupational History   • Not on file.     Social History Main Topics   • Smoking status: Never Smoker   • Smokeless tobacco: Never Used   • Alcohol use Yes      Comment: rarely   • Drug use: Unknown   • Sexual activity: Not on file     Other Topics Concern   • Not on file     Social History Narrative   • No narrative on file        Current Outpatient Prescriptions:   •  calcium citrate-vitamin d (CITRACAL) 200-250 MG-UNIT tablet tablet, Take  by mouth Daily., Disp: , Rfl:   •  Cholecalciferol (VITAMIN D3) 5000 UNITS capsule capsule, Take 5,000 Units by mouth Daily., Disp: , Rfl:   •  Loratadine (CLARITIN PO), Take  by mouth., Disp: , Rfl:   •  Multiple Vitamins-Minerals (MULTI FOR HER 50+ PO), Take  by mouth., Disp: , Rfl:      Objective     She continues to complain of lumbar spine pain.  She does not have any radicular symptoms.    It's worse after she's been sitting spell.  It does hurt her occasionally when she is rolling over in bed.  Over the last 18 years, she has had some lumbar spine pain.  It's a bit worse now than it used to be.    She had an x-ray of her lumbar spine on December 19.  It showed some mild level scoliosis, some spurring, some mild spondylosis.  Degenerative Disc Changes.         Review of Systems   Constitutional:  Negative.    HENT: Negative.    Respiratory: Negative.    Cardiovascular: Negative.    Musculoskeletal: Positive for back pain.   Psychiatric/Behavioral: Negative.        Physical Exam   Constitutional: She is oriented to person, place, and time. She appears well-developed and well-nourished.   HENT:   Head: Normocephalic and atraumatic.   Pulmonary/Chest: Effort normal.   Neurological: She is alert and oriented to person, place, and time.   Skin: Skin is warm and dry.   Psychiatric: She has a normal mood and affect. Her behavior is normal.   Nursing note and vitals reviewed.        Problem List Items Addressed This Visit        Nervous and Auditory    Chronic low back pain - Primary    Relevant Orders    Ambulatory Referral to Physical Therapy      Other Visit Diagnoses    None.           PLAN  I think she may benefit from physical therapy.  I've referred her today.    She does not feel that she wants to try any anti-inflammatory medications at this time.    Follow-up in about 6 months.  No Follow-up on file.

## 2018-10-25 DIAGNOSIS — E78.5 HYPERLIPIDEMIA, UNSPECIFIED HYPERLIPIDEMIA TYPE: Primary | ICD-10-CM

## 2018-10-29 DIAGNOSIS — E78.5 HYPERLIPIDEMIA, UNSPECIFIED HYPERLIPIDEMIA TYPE: Primary | ICD-10-CM

## 2018-10-29 LAB
ALBUMIN SERPL-MCNC: 4.7 G/DL (ref 3.5–5.2)
ALBUMIN/GLOB SERPL: 2.2 G/DL
ALP SERPL-CCNC: 67 U/L (ref 39–117)
ALT SERPL-CCNC: 19 U/L (ref 1–33)
AST SERPL-CCNC: 23 U/L (ref 1–32)
BASOPHILS # BLD AUTO: 0.01 10*3/MM3 (ref 0–0.2)
BASOPHILS NFR BLD AUTO: 0.3 % (ref 0–1.5)
BILIRUB SERPL-MCNC: 0.8 MG/DL (ref 0.1–1.2)
BUN SERPL-MCNC: 18 MG/DL (ref 8–23)
BUN/CREAT SERPL: 19.1 (ref 7–25)
CALCIUM SERPL-MCNC: 9.8 MG/DL (ref 8.6–10.5)
CHLORIDE SERPL-SCNC: 102 MMOL/L (ref 98–107)
CHOLEST SERPL-MCNC: 211 MG/DL (ref 0–200)
CO2 SERPL-SCNC: 27.2 MMOL/L (ref 22–29)
CREAT SERPL-MCNC: 0.94 MG/DL (ref 0.57–1)
EOSINOPHIL # BLD AUTO: 0.11 10*3/MM3 (ref 0–0.7)
EOSINOPHIL NFR BLD AUTO: 3.2 % (ref 0.3–6.2)
ERYTHROCYTE [DISTWIDTH] IN BLOOD BY AUTOMATED COUNT: 13.8 % (ref 11.7–13)
GLOBULIN SER CALC-MCNC: 2.1 GM/DL
GLUCOSE SERPL-MCNC: 97 MG/DL (ref 65–99)
HCT VFR BLD AUTO: 43 % (ref 35.6–45.5)
HDLC SERPL-MCNC: 67 MG/DL (ref 40–60)
HGB BLD-MCNC: 13.2 G/DL (ref 11.9–15.5)
IMM GRANULOCYTES # BLD: 0 10*3/MM3 (ref 0–0.03)
IMM GRANULOCYTES NFR BLD: 0 % (ref 0–0.5)
LDLC SERPL CALC-MCNC: 129 MG/DL (ref 0–100)
LDLC/HDLC SERPL: 1.92 {RATIO}
LYMPHOCYTES # BLD AUTO: 1.11 10*3/MM3 (ref 0.9–4.8)
LYMPHOCYTES NFR BLD AUTO: 32.6 % (ref 19.6–45.3)
MCH RBC QN AUTO: 27 PG (ref 26.9–32)
MCHC RBC AUTO-ENTMCNC: 30.7 G/DL (ref 32.4–36.3)
MCV RBC AUTO: 87.9 FL (ref 80.5–98.2)
MONOCYTES # BLD AUTO: 0.62 10*3/MM3 (ref 0.2–1.2)
MONOCYTES NFR BLD AUTO: 18.2 % (ref 5–12)
NEUTROPHILS # BLD AUTO: 1.55 10*3/MM3 (ref 1.9–8.1)
NEUTROPHILS NFR BLD AUTO: 45.7 % (ref 42.7–76)
PLATELET # BLD AUTO: 194 10*3/MM3 (ref 140–500)
POTASSIUM SERPL-SCNC: 4.3 MMOL/L (ref 3.5–5.2)
PROT SERPL-MCNC: 6.8 G/DL (ref 6–8.5)
RBC # BLD AUTO: 4.89 10*6/MM3 (ref 3.9–5.2)
SODIUM SERPL-SCNC: 142 MMOL/L (ref 136–145)
TRIGL SERPL-MCNC: 77 MG/DL (ref 0–150)
VLDLC SERPL CALC-MCNC: 15.4 MG/DL (ref 5–40)
WBC # BLD AUTO: 3.4 10*3/MM3 (ref 4.5–10.7)

## 2018-11-05 ENCOUNTER — OFFICE VISIT (OUTPATIENT)
Dept: FAMILY MEDICINE CLINIC | Facility: CLINIC | Age: 63
End: 2018-11-05

## 2018-11-05 VITALS
DIASTOLIC BLOOD PRESSURE: 72 MMHG | SYSTOLIC BLOOD PRESSURE: 124 MMHG | WEIGHT: 180.4 LBS | HEIGHT: 69 IN | HEART RATE: 67 BPM | OXYGEN SATURATION: 99 % | RESPIRATION RATE: 16 BRPM | BODY MASS INDEX: 26.72 KG/M2

## 2018-11-05 DIAGNOSIS — Z23 NEED FOR VACCINATION: Primary | ICD-10-CM

## 2018-11-05 DIAGNOSIS — E78.00 HYPERCHOLESTEROLEMIA: ICD-10-CM

## 2018-11-05 PROCEDURE — 99213 OFFICE O/P EST LOW 20 MIN: CPT | Performed by: INTERNAL MEDICINE

## 2018-11-05 PROCEDURE — 90670 PCV13 VACCINE IM: CPT | Performed by: INTERNAL MEDICINE

## 2018-11-05 PROCEDURE — G0009 ADMIN PNEUMOCOCCAL VACCINE: HCPCS | Performed by: INTERNAL MEDICINE

## 2018-11-05 RX ORDER — MOMETASONE FUROATE 1 MG/G
CREAM TOPICAL AS NEEDED
Refills: 1 | COMMUNITY
Start: 2018-08-10 | End: 2022-07-18

## 2018-11-05 NOTE — PROGRESS NOTES
Subjective   Juli Manzanares is a 63 y.o. female. Patient is here today for   Chief Complaint   Patient presents with   • Follow-up     HLD           Vitals:    11/05/18 0912   BP: 124/72   Pulse: 67   Resp: 16   SpO2: 99%       Past Medical History:   Diagnosis Date   • Arthritis    • Asthma    • H/O colonoscopy    • Hemorrhoids       Allergies   Allergen Reactions   • Bactrim [Sulfamethoxazole-Trimethoprim]    • Carbocaine [Mepivacaine Hcl]    • Novocain [Procaine]    • Penicillins    • Latex Rash      Social History     Social History   • Marital status:      Spouse name: N/A   • Number of children: N/A   • Years of education: N/A     Occupational History   • Not on file.     Social History Main Topics   • Smoking status: Never Smoker   • Smokeless tobacco: Never Used   • Alcohol use Yes      Comment: rarely   • Drug use: Unknown   • Sexual activity: Not on file     Other Topics Concern   • Not on file     Social History Narrative   • No narrative on file        Current Outpatient Prescriptions:   •  calcium citrate-vitamin d (CITRACAL) 200-250 MG-UNIT tablet tablet, Take  by mouth Daily., Disp: , Rfl:   •  Cholecalciferol (VITAMIN D3) 5000 UNITS capsule capsule, Take 5,000 Units by mouth Daily., Disp: , Rfl:   •  Loratadine (CLARITIN PO), Take  by mouth., Disp: , Rfl:   •  mometasone (ELOCON) 0.1 % cream, APPLY TO AFFECTED AREA OF FACE AS NEEDED FOR RASH FOR UP TO 2 WEEKS, Disp: , Rfl: 1  •  Multiple Vitamins-Minerals (MULTI FOR HER 50+ PO), Take  by mouth., Disp: , Rfl:      Objective     She is here to follow-up on some lab work done last week.    She had a colonoscopy earlier this year.    She had vascular screening in 2017 which showed no plaque.    She is up-to-date with flu shot.    She just turned 63 she needs a pneumonia shot.    She has no complaints.         Review of Systems   HENT: Negative.    Respiratory: Negative.    Cardiovascular: Negative.    Musculoskeletal: Negative.     Psychiatric/Behavioral: Negative.        Physical Exam   Constitutional: She is oriented to person, place, and time. She appears well-developed and well-nourished.   HENT:   Head: Normocephalic and atraumatic.   Pulmonary/Chest: Effort normal.   Neurological: She is alert and oriented to person, place, and time.   Psychiatric: She has a normal mood and affect. Her behavior is normal.   Nursing note and vitals reviewed.        Problem List Items Addressed This Visit        Cardiovascular and Mediastinum    Hypercholesterolemia       Other    Need for vaccination - Primary    Relevant Orders    Pneumococcal Conjugate Vaccine 13-Valent All (PCV13) (Completed)            PLAN  Cerebral vascular screening.    She should follow-up for Medicare wellness visit once yearly.    I asked her to follow-up in 6 months.  No Follow-up on file.

## 2018-11-06 PROBLEM — E78.00 HYPERCHOLESTEROLEMIA: Status: ACTIVE | Noted: 2018-11-06

## 2018-11-06 PROBLEM — Z23 NEED FOR VACCINATION: Status: ACTIVE | Noted: 2018-11-06

## 2019-04-03 ENCOUNTER — APPOINTMENT (OUTPATIENT)
Dept: WOMENS IMAGING | Facility: HOSPITAL | Age: 64
End: 2019-04-03

## 2019-04-03 PROCEDURE — 77063 BREAST TOMOSYNTHESIS BI: CPT | Performed by: RADIOLOGY

## 2019-04-03 PROCEDURE — 77067 SCR MAMMO BI INCL CAD: CPT | Performed by: RADIOLOGY

## 2019-05-03 DIAGNOSIS — Z11.59 NEED FOR HEPATITIS C SCREENING TEST: ICD-10-CM

## 2019-05-03 DIAGNOSIS — E78.00 HYPERCHOLESTEROLEMIA: Primary | ICD-10-CM

## 2019-05-08 LAB
ALBUMIN SERPL-MCNC: 4.7 G/DL (ref 3.5–5.2)
ALBUMIN/GLOB SERPL: 2.4 G/DL
ALP SERPL-CCNC: 69 U/L (ref 39–117)
ALT SERPL-CCNC: 19 U/L (ref 1–33)
AST SERPL-CCNC: 26 U/L (ref 1–32)
BASOPHILS # BLD AUTO: 0.02 10*3/MM3 (ref 0–0.2)
BASOPHILS NFR BLD AUTO: 0.6 % (ref 0–1.5)
BILIRUB SERPL-MCNC: 0.9 MG/DL (ref 0.2–1.2)
BUN SERPL-MCNC: 19 MG/DL (ref 8–23)
BUN/CREAT SERPL: 21.3 (ref 7–25)
CALCIUM SERPL-MCNC: 10.2 MG/DL (ref 8.6–10.5)
CHLORIDE SERPL-SCNC: 99 MMOL/L (ref 98–107)
CHOLEST SERPL-MCNC: 203 MG/DL (ref 0–200)
CO2 SERPL-SCNC: 26.4 MMOL/L (ref 22–29)
CREAT SERPL-MCNC: 0.89 MG/DL (ref 0.57–1)
EOSINOPHIL # BLD AUTO: 0.22 10*3/MM3 (ref 0–0.4)
EOSINOPHIL NFR BLD AUTO: 6.9 % (ref 0.3–6.2)
ERYTHROCYTE [DISTWIDTH] IN BLOOD BY AUTOMATED COUNT: 14.2 % (ref 12.3–15.4)
GLOBULIN SER CALC-MCNC: 2 GM/DL
GLUCOSE SERPL-MCNC: 85 MG/DL (ref 65–99)
HCT VFR BLD AUTO: 42.2 % (ref 34–46.6)
HCV AB S/CO SERPL IA: <0.1 S/CO RATIO (ref 0–0.9)
HCV AB SERPL QL IA: NORMAL
HDLC SERPL-MCNC: 75 MG/DL (ref 40–60)
HGB BLD-MCNC: 13.2 G/DL (ref 12–15.9)
IMM GRANULOCYTES # BLD AUTO: 0 10*3/MM3 (ref 0–0.05)
IMM GRANULOCYTES NFR BLD AUTO: 0 % (ref 0–0.5)
LDLC SERPL CALC-MCNC: 119 MG/DL (ref 0–100)
LDLC/HDLC SERPL: 1.59 {RATIO}
LYMPHOCYTES # BLD AUTO: 1.12 10*3/MM3 (ref 0.7–3.1)
LYMPHOCYTES NFR BLD AUTO: 35 % (ref 19.6–45.3)
MCH RBC QN AUTO: 28 PG (ref 26.6–33)
MCHC RBC AUTO-ENTMCNC: 31.3 G/DL (ref 31.5–35.7)
MCV RBC AUTO: 89.4 FL (ref 79–97)
MONOCYTES # BLD AUTO: 0.51 10*3/MM3 (ref 0.1–0.9)
MONOCYTES NFR BLD AUTO: 15.9 % (ref 5–12)
NEUTROPHILS # BLD AUTO: 1.33 10*3/MM3 (ref 1.7–7)
NEUTROPHILS NFR BLD AUTO: 41.6 % (ref 42.7–76)
NRBC BLD AUTO-RTO: 0 /100 WBC (ref 0–0.2)
PLATELET # BLD AUTO: 188 10*3/MM3 (ref 140–450)
POTASSIUM SERPL-SCNC: 4.2 MMOL/L (ref 3.5–5.2)
PROT SERPL-MCNC: 6.7 G/DL (ref 6–8.5)
RBC # BLD AUTO: 4.72 10*6/MM3 (ref 3.77–5.28)
SODIUM SERPL-SCNC: 140 MMOL/L (ref 136–145)
TRIGL SERPL-MCNC: 45 MG/DL (ref 0–150)
VLDLC SERPL CALC-MCNC: 9 MG/DL
WBC # BLD AUTO: 3.2 10*3/MM3 (ref 3.4–10.8)

## 2019-05-14 ENCOUNTER — OFFICE VISIT (OUTPATIENT)
Dept: FAMILY MEDICINE CLINIC | Facility: CLINIC | Age: 64
End: 2019-05-14

## 2019-05-14 VITALS
RESPIRATION RATE: 16 BRPM | BODY MASS INDEX: 25.86 KG/M2 | WEIGHT: 174.6 LBS | HEART RATE: 62 BPM | TEMPERATURE: 98.1 F | HEIGHT: 69 IN | SYSTOLIC BLOOD PRESSURE: 120 MMHG | DIASTOLIC BLOOD PRESSURE: 70 MMHG | OXYGEN SATURATION: 99 %

## 2019-05-14 DIAGNOSIS — E78.00 HYPERCHOLESTEROLEMIA: Primary | ICD-10-CM

## 2019-05-14 DIAGNOSIS — D72.810 LYMPHOCYTOPENIA: ICD-10-CM

## 2019-05-14 PROCEDURE — 99213 OFFICE O/P EST LOW 20 MIN: CPT | Performed by: INTERNAL MEDICINE

## 2019-05-14 NOTE — PROGRESS NOTES
Subjective   Juli Manzanares is a 63 y.o. female. Patient is here today for   Chief Complaint   Patient presents with   • Follow-up     HYPERCHOLESTERLEMIA          Vitals:    05/14/19 0831   BP: 120/70   Pulse: 62   Resp: 16   Temp: 98.1 °F (36.7 °C)   SpO2: 99%       Past Medical History:   Diagnosis Date   • Arthritis    • Asthma    • H/O colonoscopy    • Hemorrhoids       Allergies   Allergen Reactions   • Bactrim [Sulfamethoxazole-Trimethoprim]    • Carbocaine [Mepivacaine Hcl]    • Novocain [Procaine]    • Penicillins    • Latex Rash      Social History     Socioeconomic History   • Marital status:      Spouse name: Not on file   • Number of children: Not on file   • Years of education: Not on file   • Highest education level: Not on file   Tobacco Use   • Smoking status: Never Smoker   • Smokeless tobacco: Never Used   Substance and Sexual Activity   • Alcohol use: Yes     Comment: rarely        Current Outpatient Medications:   •  calcium citrate-vitamin d (CITRACAL) 200-250 MG-UNIT tablet tablet, Take  by mouth Daily., Disp: , Rfl:   •  Cholecalciferol (VITAMIN D3) 5000 UNITS capsule capsule, Take 5,000 Units by mouth Daily., Disp: , Rfl:   •  Loratadine (CLARITIN PO), Take  by mouth., Disp: , Rfl:   •  mometasone (ELOCON) 0.1 % cream, APPLY TO AFFECTED AREA OF FACE AS NEEDED FOR RASH FOR UP TO 2 WEEKS, Disp: , Rfl: 1  •  Multiple Vitamins-Minerals (MULTI FOR HER 50+ PO), Take  by mouth., Disp: , Rfl:      Objective     Here today for follow-up on hypercholesterolemia.    She has no complaints.         Review of Systems   Constitutional: Negative.    HENT: Negative.    Respiratory: Negative.    Cardiovascular: Negative.    Musculoskeletal: Negative.    Psychiatric/Behavioral: Negative.        Physical Exam   Constitutional: She is oriented to person, place, and time. She appears well-developed and well-nourished.   Pleasant, neatly groomed, in no distress, BMI 26.2.   HENT:   Head: Normocephalic and  atraumatic.   Cardiovascular: Normal rate, regular rhythm and normal heart sounds. Exam reveals no friction rub.   No murmur heard.  Pulmonary/Chest: Effort normal and breath sounds normal. No stridor. No respiratory distress. She has no wheezes.   Neurological: She is alert and oriented to person, place, and time.   Psychiatric: She has a normal mood and affect. Her behavior is normal.   Nursing note and vitals reviewed.        Problem List Items Addressed This Visit        Cardiovascular and Mediastinum    Hypercholesterolemia - Primary       Immune and Lymphatic    Lymphocytopenia            PLAN  Her hypercholesterolemia is well controlled on diet alone.    Weight.  Of encouraged her to do her best to lose weight via regular aerobic activity and decrease caloric intake.    She has a mild, persistent leukopenia.  Like her back in about 6 months.  If this persists, I would refer her to hematology for their evaluation.    Follow-up for a Medicare wellness visit once yearly.  No Follow-up on file.

## 2019-11-04 DIAGNOSIS — D72.810 LYMPHOCYTOPENIA: ICD-10-CM

## 2019-11-04 DIAGNOSIS — E78.00 HYPERCHOLESTEROLEMIA: Primary | ICD-10-CM

## 2019-11-06 LAB
ALBUMIN SERPL-MCNC: 4.6 G/DL (ref 3.5–5.2)
ALBUMIN/GLOB SERPL: 2.1 G/DL
ALP SERPL-CCNC: 60 U/L (ref 39–117)
ALT SERPL-CCNC: 19 U/L (ref 1–33)
AST SERPL-CCNC: 28 U/L (ref 1–32)
BASOPHILS # BLD AUTO: 0.02 10*3/MM3 (ref 0–0.2)
BASOPHILS NFR BLD AUTO: 0.7 % (ref 0–1.5)
BILIRUB SERPL-MCNC: 0.9 MG/DL (ref 0.2–1.2)
BUN SERPL-MCNC: 20 MG/DL (ref 8–23)
BUN/CREAT SERPL: 20.2 (ref 7–25)
CALCIUM SERPL-MCNC: 9.7 MG/DL (ref 8.6–10.5)
CHLORIDE SERPL-SCNC: 102 MMOL/L (ref 98–107)
CHOLEST SERPL-MCNC: 215 MG/DL (ref 0–200)
CO2 SERPL-SCNC: 26.3 MMOL/L (ref 22–29)
CREAT SERPL-MCNC: 0.99 MG/DL (ref 0.57–1)
EOSINOPHIL # BLD AUTO: 0.14 10*3/MM3 (ref 0–0.4)
EOSINOPHIL NFR BLD AUTO: 4.8 % (ref 0.3–6.2)
ERYTHROCYTE [DISTWIDTH] IN BLOOD BY AUTOMATED COUNT: 13.2 % (ref 12.3–15.4)
GLOBULIN SER CALC-MCNC: 2.2 GM/DL
GLUCOSE SERPL-MCNC: 95 MG/DL (ref 65–99)
HCT VFR BLD AUTO: 41.7 % (ref 34–46.6)
HDLC SERPL-MCNC: 75 MG/DL (ref 40–60)
HGB BLD-MCNC: 13.4 G/DL (ref 12–15.9)
IMM GRANULOCYTES # BLD AUTO: 0.01 10*3/MM3 (ref 0–0.05)
IMM GRANULOCYTES NFR BLD AUTO: 0.3 % (ref 0–0.5)
LDLC SERPL CALC-MCNC: 125 MG/DL (ref 0–100)
LDLC/HDLC SERPL: 1.67 {RATIO}
LYMPHOCYTES # BLD AUTO: 1.06 10*3/MM3 (ref 0.7–3.1)
LYMPHOCYTES NFR BLD AUTO: 36.6 % (ref 19.6–45.3)
MCH RBC QN AUTO: 27.9 PG (ref 26.6–33)
MCHC RBC AUTO-ENTMCNC: 32.1 G/DL (ref 31.5–35.7)
MCV RBC AUTO: 86.9 FL (ref 79–97)
MONOCYTES # BLD AUTO: 0.4 10*3/MM3 (ref 0.1–0.9)
MONOCYTES NFR BLD AUTO: 13.8 % (ref 5–12)
NEUTROPHILS # BLD AUTO: 1.27 10*3/MM3 (ref 1.7–7)
NEUTROPHILS NFR BLD AUTO: 43.8 % (ref 42.7–76)
NRBC BLD AUTO-RTO: 0 /100 WBC (ref 0–0.2)
PLATELET # BLD AUTO: 171 10*3/MM3 (ref 140–450)
POTASSIUM SERPL-SCNC: 5.1 MMOL/L (ref 3.5–5.2)
PROT SERPL-MCNC: 6.8 G/DL (ref 6–8.5)
RBC # BLD AUTO: 4.8 10*6/MM3 (ref 3.77–5.28)
SODIUM SERPL-SCNC: 142 MMOL/L (ref 136–145)
TRIGL SERPL-MCNC: 73 MG/DL (ref 0–150)
VLDLC SERPL CALC-MCNC: 14.6 MG/DL (ref 5–40)
WBC # BLD AUTO: 2.9 10*3/MM3 (ref 3.4–10.8)

## 2019-12-06 ENCOUNTER — OFFICE VISIT (OUTPATIENT)
Dept: FAMILY MEDICINE CLINIC | Facility: CLINIC | Age: 64
End: 2019-12-06

## 2019-12-06 VITALS
WEIGHT: 181.2 LBS | OXYGEN SATURATION: 98 % | SYSTOLIC BLOOD PRESSURE: 104 MMHG | HEART RATE: 58 BPM | RESPIRATION RATE: 18 BRPM | BODY MASS INDEX: 26.84 KG/M2 | DIASTOLIC BLOOD PRESSURE: 68 MMHG | HEIGHT: 69 IN | TEMPERATURE: 97.7 F

## 2019-12-06 DIAGNOSIS — Z23 NEED FOR PNEUMOCOCCAL VACCINATION: ICD-10-CM

## 2019-12-06 DIAGNOSIS — E78.00 HYPERCHOLESTEROLEMIA: ICD-10-CM

## 2019-12-06 DIAGNOSIS — D72.810 LYMPHOCYTOPENIA: Primary | ICD-10-CM

## 2019-12-06 PROCEDURE — 90732 PPSV23 VACC 2 YRS+ SUBQ/IM: CPT | Performed by: INTERNAL MEDICINE

## 2019-12-06 PROCEDURE — G0009 ADMIN PNEUMOCOCCAL VACCINE: HCPCS | Performed by: INTERNAL MEDICINE

## 2019-12-06 PROCEDURE — 99213 OFFICE O/P EST LOW 20 MIN: CPT | Performed by: INTERNAL MEDICINE

## 2019-12-06 NOTE — PROGRESS NOTES
Subjective   Juli Manzanares is a 64 y.o. female. Patient is here today for   Chief Complaint   Patient presents with   • Hyperlipidemia          Vitals:    12/06/19 0810   BP: 104/68   Pulse: 58   Resp: 18   Temp: 97.7 °F (36.5 °C)   SpO2: 98%     Body mass index is 27.15 kg/m².      Past Medical History:   Diagnosis Date   • Arthritis    • Asthma    • H/O colonoscopy    • Hemorrhoids       Allergies   Allergen Reactions   • Bactrim [Sulfamethoxazole-Trimethoprim]    • Carbocaine [Mepivacaine Hcl]    • Novocain [Procaine]    • Penicillins    • Latex Rash      Social History     Socioeconomic History   • Marital status:      Spouse name: Not on file   • Number of children: Not on file   • Years of education: Not on file   • Highest education level: Not on file   Tobacco Use   • Smoking status: Never Smoker   • Smokeless tobacco: Never Used   Substance and Sexual Activity   • Alcohol use: Yes     Comment: rarely        Current Outpatient Medications:   •  calcium citrate-vitamin d (CITRACAL) 200-250 MG-UNIT tablet tablet, Take  by mouth Daily., Disp: , Rfl:   •  Cholecalciferol (VITAMIN D3) 5000 UNITS capsule capsule, Take 5,000 Units by mouth Daily., Disp: , Rfl:   •  Loratadine (CLARITIN PO), Take  by mouth., Disp: , Rfl:   •  mometasone (ELOCON) 0.1 % cream, APPLY TO AFFECTED AREA OF FACE AS NEEDED FOR RASH FOR UP TO 2 WEEKS, Disp: , Rfl: 1  •  Multiple Vitamins-Minerals (MULTI FOR HER 50+ PO), Take  by mouth., Disp: , Rfl:      Objective      She is here today to follow-up on Labs done last week.    She has no complaints.         Review of Systems   Constitutional: Negative.    HENT: Negative.    Respiratory: Negative.    Cardiovascular: Negative.    Musculoskeletal: Negative.    Psychiatric/Behavioral: Negative.        Physical Exam   Constitutional: She is oriented to person, place, and time. She appears well-developed and well-nourished.   HENT:   Head: Normocephalic and atraumatic.   Pulmonary/Chest:  Effort normal.   Neurological: She is alert and oriented to person, place, and time.   Psychiatric: She has a normal mood and affect. Her behavior is normal.   Nursing note and vitals reviewed.        Problem List Items Addressed This Visit        Cardiovascular and Mediastinum    Hypercholesterolemia       Immune and Lymphatic    Lymphocytopenia - Primary    Relevant Orders    Ambulatory Referral to Hematology      Other Visit Diagnoses     Need for pneumococcal vaccination        Relevant Orders    Pneumococcal Polysaccharide Vaccine 23-Valent Greater Than or Equal To 3yo Subcutaneous / IM            PLAN    She and I reviewed her labs.  Her cholesterol is well controlled with diet alone.    She has a persistently low lymphocytes.  I am going to refer her to hematology to get their opinion on this.    She is due for a Pneumovax 23 today.  We gave her one.    I like to see her back in 6 months.  No Follow-up on file.

## 2020-01-03 NOTE — PROGRESS NOTES
.     REASON FOR CONSULTATION:   Leukocytopenia, neutropenia  Provide an opinion on any further workup or treatment                             REQUESTING PHYSICIAN: No ref. provider found  RECORDS OBTAINED:  Records of the patients history including those obtained from the referring provider were reviewed and summarized in detail.    HISTORY OF PRESENT ILLNESS:  The patient is a 64 y.o. year old female  who is here for follow-up with the above-mentioned history.    On 10/29/2018, WBC 3.4 (normal range 4.5-10.7).  ANC 1550 (normal range 2020-7541).  On 5/7/2019, WBC 3.2 (normal range 3.4-10.8).  ANC 1330.  (Normal range 6649-4851)  On 11/5/2019, WBC 2.9 (normal range 3.4-10.8), ANC 1270 (normal range 6927-1031).    Patient last saw PCP, Dr. DING, on 12/6/2019.  He saw her for follow-up of hyperlipidemia.  He noted the persistently low WBC and referred her to us.    Denies recurrent or unusual infections.  Denies fever, chills, weight loss, night sweats.    Has been a little more fatigued than usual.  However, continues to go to the gym regularly, doing 20 minutes of cardio followed by weight training.    Past Medical History:   Diagnosis Date   • Arthritis    • Asthma    • H/O colonoscopy    • Hemorrhoids      Past Surgical History:   Procedure Laterality Date   • COLONOSCOPY N/A 7/12/2017    Procedure: COLONOSCOPY into cecum;  Surgeon: Farida Enriquez MD;  Location: [facility] ENDOSCOPY;  Service:    • SKIN GRAFT      LOWER MOUTH    • WISDOM TOOTH EXTRACTION         HEMATOLOGIC/ONCOLOGIC HISTORY:  (History from previous dates can be found in the separate document.)    MEDICATIONS    Current Outpatient Medications:   •  BIOTIN PO, Take 4,000 mcg by mouth., Disp: , Rfl:   •  calcium citrate-vitamin d (CITRACAL) 200-250 MG-UNIT tablet tablet, Take  by mouth Daily., Disp: , Rfl:   •  Cholecalciferol (VITAMIN D3) 5000 UNITS capsule capsule, Take 5,000 Units by mouth Daily., Disp: , Rfl:   •  hydrocortisone 1 % cream,  Apply  topically to the appropriate area as directed As Needed., Disp: , Rfl:   •  Loratadine (CLARITIN PO), Take  by mouth As Needed., Disp: , Rfl:   •  mometasone (ELOCON) 0.1 % cream, As Needed., Disp: , Rfl: 1  •  Multiple Vitamins-Minerals (MULTI FOR HER 50+ PO), Take  by mouth., Disp: , Rfl:     ALLERGIES:     Allergies   Allergen Reactions   • Bactrim [Sulfamethoxazole-Trimethoprim] Unknown - Low Severity   • Carbocaine [Mepivacaine Hcl] Unknown - Low Severity   • Novocain [Procaine] Unknown - Low Severity   • Penicillins Unknown - Low Severity   • Latex Rash       SOCIAL HISTORY:       Social History     Socioeconomic History   • Marital status:      Spouse name: Not on file   • Number of children: Not on file   • Years of education: Not on file   • Highest education level: Not on file   Tobacco Use   • Smoking status: Never Smoker   • Smokeless tobacco: Never Used   Substance and Sexual Activity   • Alcohol use: Yes     Comment: rarely         FAMILY HISTORY:  Family History   Problem Relation Age of Onset   • Cancer Mother    • Hypertension Father    • Diabetes Father    • Kidney disease Father        REVIEW OF SYSTEMS:  Review of Systems   Constitutional: Negative for activity change.   HENT: Negative for nosebleeds and trouble swallowing.    Respiratory: Negative for shortness of breath and wheezing.    Cardiovascular: Negative for chest pain and palpitations.   Gastrointestinal: Negative for constipation, diarrhea and nausea.   Genitourinary: Negative for dysuria and hematuria.   Musculoskeletal: Negative for arthralgias and myalgias.   Skin: Negative for rash and wound.   Neurological: Negative for seizures and syncope.   Hematological: Negative for adenopathy. Does not bruise/bleed easily.   Psychiatric/Behavioral: Negative for confusion.              Vitals:    01/06/20 0907   BP: 134/82   Pulse: 66   Resp: 14   Temp: 98.7 °F (37.1 °C)   TempSrc: Oral   SpO2: 99%   Weight: 82.2 kg (181 lb 3.2  "oz)   Height: 173.1 cm (68.15\")  Comment: NEW   PainSc: 0-No pain     Current Status 1/6/2020   ECOG score 0      PHYSICAL EXAM:      CONSTITUTIONAL:  Vital signs reviewed.  No distress, looks comfortable.  EYES:  Conjunctiva and lids unremarkable.  PERRLA  EARS,NOSE,MOUTH,THROAT:  Ears and nose appear unremarkable.  Lips, teeth, gums appear unremarkable.  RESPIRATORY:  Normal respiratory effort.  Lungs clear to auscultation bilaterally.  CARDIOVASCULAR:  Normal S1, S2.  No murmurs rubs or gallops.  No significant lower extremity edema.  GASTROINTESTINAL: Abdomen appears unremarkable.  Nontender.  No hepatomegaly.  No splenomegaly.  LYMPHATIC:  No cervical, supraclavicular, axillary lymphadenopathy.  MUSCULOSKELETAL:  Unremarkable gait and station.  Unremarkable digits/nails.  No cyanosis or clubbing.  SKIN:  Warm.  No rashes.  PSYCHIATRIC:  Normal judgment and insight.  Normal mood and affect.      RECENT LABS:        WBC   Date Value Ref Range Status   01/06/2020 5.22 3.40 - 10.80 10*3/mm3 Final   11/05/2019 2.90 (L) 3.40 - 10.80 10*3/mm3 Final   05/07/2019 3.20 (L) 3.40 - 10.80 10*3/mm3 Final   10/29/2018 3.40 (L) 4.50 - 10.70 10*3/mm3 Final     Hemoglobin   Date Value Ref Range Status   01/06/2020 14.4 12.0 - 15.9 g/dL Final   11/05/2019 13.4 12.0 - 15.9 g/dL Final   05/07/2019 13.2 12.0 - 15.9 g/dL Final   10/29/2018 13.2 11.9 - 15.5 g/dL Final     Platelets   Date Value Ref Range Status   01/06/2020 145 140 - 450 10*3/mm3 Final   11/05/2019 171 140 - 450 10*3/mm3 Final   05/07/2019 188 140 - 450 10*3/mm3 Final   10/29/2018 194 140 - 500 10*3/mm3 Final       Assessment/Plan   Leukopenia, unspecified type  - CBC & Differential  - Vitamin B12  - Folate RBC    *Leukocytopenia, due to neutropenia.  · On 10/29/2018, WBC 3.4 (normal range 4.5-10.7).    · On 5/7/2019, WBC 3.2 (normal range 3.4-10.8).   · On 11/5/2019, WBC 2.9 (normal range 3.4-10.8).  · WBC 5.2 today, 1/6/2020    *Neutropenia.  · On 10/29/2018,  ANC " 1550 (normal range 5796-3802).  · On 5/7/2019, ANC 1330.  (Normal range 1517-4335)  · On 11/5/2019, ANC 1270 (normal range 5723-5264).  · ANC 3020 today, 1/6/2020    *Etiology of leukocytopenia and neutropenia:  · Denies recurrent or unusual infections.  Denies B symptoms.  Hb and PLT normal.  · Has had no abdominal imaging.  However, do not think this is necessarily needed at this time.  · I told her if numbers should significantly worsen in the future, we may want to consider a bone marrow biopsy.  · I told her to expect intermittent leukocytopenia and neutropenia.  · Check B12 and folate levels    Plan  Check B12 and RBC folate  MD CBC 6 months     assisted with history.  Records reviewed and summarized.  Peripheral smear was personally reviewed by me and looks unremarkable.

## 2020-01-06 ENCOUNTER — LAB (OUTPATIENT)
Dept: LAB | Facility: HOSPITAL | Age: 65
End: 2020-01-06

## 2020-01-06 ENCOUNTER — CONSULT (OUTPATIENT)
Dept: ONCOLOGY | Facility: CLINIC | Age: 65
End: 2020-01-06

## 2020-01-06 VITALS
DIASTOLIC BLOOD PRESSURE: 82 MMHG | HEART RATE: 66 BPM | TEMPERATURE: 98.7 F | OXYGEN SATURATION: 99 % | SYSTOLIC BLOOD PRESSURE: 134 MMHG | WEIGHT: 181.2 LBS | BODY MASS INDEX: 27.46 KG/M2 | HEIGHT: 68 IN | RESPIRATION RATE: 14 BRPM

## 2020-01-06 DIAGNOSIS — D72.810 LYMPHOCYTOPENIA: Primary | ICD-10-CM

## 2020-01-06 DIAGNOSIS — D72.819 LEUKOPENIA, UNSPECIFIED TYPE: Primary | ICD-10-CM

## 2020-01-06 LAB
BASOPHILS # BLD AUTO: 0.03 10*3/MM3 (ref 0–0.2)
BASOPHILS NFR BLD AUTO: 0.6 % (ref 0–1.5)
DEPRECATED RDW RBC AUTO: 41.6 FL (ref 37–54)
EOSINOPHIL # BLD AUTO: 0.21 10*3/MM3 (ref 0–0.4)
EOSINOPHIL NFR BLD AUTO: 4 % (ref 0.3–6.2)
ERYTHROCYTE [DISTWIDTH] IN BLOOD BY AUTOMATED COUNT: 13.4 % (ref 12.3–15.4)
HCT VFR BLD AUTO: 41.7 % (ref 34–46.6)
HGB BLD-MCNC: 14.4 G/DL (ref 12–15.9)
IMM GRANULOCYTES # BLD AUTO: 0.07 10*3/MM3 (ref 0–0.05)
IMM GRANULOCYTES NFR BLD AUTO: 1.3 % (ref 0–0.5)
LYMPHOCYTES # BLD AUTO: 1.17 10*3/MM3 (ref 0.7–3.1)
LYMPHOCYTES NFR BLD AUTO: 22.4 % (ref 19.6–45.3)
MCH RBC QN AUTO: 29.3 PG (ref 26.6–33)
MCHC RBC AUTO-ENTMCNC: 34.5 G/DL (ref 31.5–35.7)
MCV RBC AUTO: 84.8 FL (ref 79–97)
MONOCYTES # BLD AUTO: 0.72 10*3/MM3 (ref 0.1–0.9)
MONOCYTES NFR BLD AUTO: 13.8 % (ref 5–12)
NEUTROPHILS # BLD AUTO: 3.02 10*3/MM3 (ref 1.7–7)
NEUTROPHILS NFR BLD AUTO: 57.9 % (ref 42.7–76)
NRBC BLD AUTO-RTO: 0 /100 WBC (ref 0–0.2)
PLATELET # BLD AUTO: 145 10*3/MM3 (ref 140–450)
PMV BLD AUTO: 9.7 FL (ref 6–12)
RBC # BLD AUTO: 4.92 10*6/MM3 (ref 3.77–5.28)
VIT B12 BLD-MCNC: 827 PG/ML (ref 211–946)
WBC NRBC COR # BLD: 5.22 10*3/MM3 (ref 3.4–10.8)

## 2020-01-06 PROCEDURE — 82607 VITAMIN B-12: CPT | Performed by: INTERNAL MEDICINE

## 2020-01-06 PROCEDURE — 36415 COLL VENOUS BLD VENIPUNCTURE: CPT

## 2020-01-06 PROCEDURE — 99205 OFFICE O/P NEW HI 60 MIN: CPT | Performed by: INTERNAL MEDICINE

## 2020-01-06 PROCEDURE — 85025 COMPLETE CBC W/AUTO DIFF WBC: CPT

## 2020-01-06 RX ORDER — DIAPER,BRIEF,INFANT-TODD,DISP
EACH MISCELLANEOUS AS NEEDED
COMMUNITY
End: 2022-07-18

## 2020-01-07 LAB
FOLATE BLD-MCNC: 446 NG/ML
FOLATE RBC-MCNC: 1099 NG/ML
HCT VFR BLD AUTO: 40.6 % (ref 34–46.6)

## 2020-01-16 ENCOUNTER — TELEPHONE (OUTPATIENT)
Dept: ONCOLOGY | Facility: CLINIC | Age: 65
End: 2020-01-16

## 2020-01-16 NOTE — TELEPHONE ENCOUNTER
Patient would like the results of her lab that she had done when she saw Dr. Heredia on 1/6.  She believes it was to check her vitamin D level and was done after her initial labs.  Dr. Heredia told her to call the office if she did not hear back.    Please call home phone -[phone].  Patient says it is fine to give information to husbandTerry if she is unavailable.  He is listed on the release form.

## 2020-02-10 ENCOUNTER — OFFICE VISIT (OUTPATIENT)
Dept: FAMILY MEDICINE CLINIC | Facility: CLINIC | Age: 65
End: 2020-02-10

## 2020-02-10 VITALS
DIASTOLIC BLOOD PRESSURE: 82 MMHG | RESPIRATION RATE: 16 BRPM | OXYGEN SATURATION: 98 % | TEMPERATURE: 98.6 F | HEART RATE: 80 BPM | SYSTOLIC BLOOD PRESSURE: 128 MMHG | HEIGHT: 68 IN | WEIGHT: 181.2 LBS | BODY MASS INDEX: 27.46 KG/M2

## 2020-02-10 DIAGNOSIS — J20.9 ACUTE BRONCHITIS, UNSPECIFIED ORGANISM: ICD-10-CM

## 2020-02-10 DIAGNOSIS — R05.9 COUGH: Primary | ICD-10-CM

## 2020-02-10 PROCEDURE — 71046 X-RAY EXAM CHEST 2 VIEWS: CPT | Performed by: INTERNAL MEDICINE

## 2020-02-10 PROCEDURE — 99213 OFFICE O/P EST LOW 20 MIN: CPT | Performed by: INTERNAL MEDICINE

## 2020-02-10 RX ORDER — ALBUTEROL SULFATE 90 UG/1
2 AEROSOL, METERED RESPIRATORY (INHALATION) EVERY 4 HOURS PRN
Qty: 1 INHALER | Refills: 2 | Status: SHIPPED | OUTPATIENT
Start: 2020-02-10 | End: 2022-07-18

## 2020-02-16 PROBLEM — J20.9 ACUTE BRONCHITIS: Status: ACTIVE | Noted: 2020-02-16

## 2020-02-16 NOTE — PROGRESS NOTES
Subjective   Juli Manzanares is a 64 y.o. female. Patient is here today for   Chief Complaint   Patient presents with   • Cough     x3 WEEKS; ALOT BETTER TODAY          Vitals:    02/10/20 1049   BP: 128/82   Pulse: 80   Resp: 16   Temp: 98.6 °F (37 °C)   SpO2: 98%     Body mass index is 27.43 kg/m².      Past Medical History:   Diagnosis Date   • Arthritis    • Asthma    • H/O colonoscopy    • Hemorrhoids       Allergies   Allergen Reactions   • Bactrim [Sulfamethoxazole-Trimethoprim] Unknown - Low Severity   • Carbocaine [Mepivacaine Hcl] Unknown - Low Severity   • Novocain [Procaine] Unknown - Low Severity   • Penicillins Unknown - Low Severity   • Latex Rash      Social History     Socioeconomic History   • Marital status:      Spouse name: Naga   • Number of children: Not on file   • Years of education: College   • Highest education level: Not on file   Occupational History     Employer: RETIRED   Tobacco Use   • Smoking status: Never Smoker   • Smokeless tobacco: Never Used   Substance and Sexual Activity   • Alcohol use: Yes     Comment: rarely   • Drug use: Never        Current Outpatient Medications:   •  BIOTIN PO, Take 4,000 mcg by mouth., Disp: , Rfl:   •  calcium citrate-vitamin d (CITRACAL) 200-250 MG-UNIT tablet tablet, Take  by mouth Daily., Disp: , Rfl:   •  Cholecalciferol (VITAMIN D3) 5000 UNITS capsule capsule, Take 5,000 Units by mouth Daily., Disp: , Rfl:   •  hydrocortisone 1 % cream, Apply  topically to the appropriate area as directed As Needed., Disp: , Rfl:   •  Loratadine (CLARITIN PO), Take  by mouth As Needed., Disp: , Rfl:   •  mometasone (ELOCON) 0.1 % cream, As Needed., Disp: , Rfl: 1  •  Multiple Vitamins-Minerals (MULTI FOR HER 50+ PO), Take  by mouth., Disp: , Rfl:   •  albuterol sulfate  (90 Base) MCG/ACT inhaler, Inhale 2 puffs Every 4 (Four) Hours As Needed for Wheezing., Disp: 1 inhaler, Rfl: 2     Objective     She has had a cough about 3 weeks and she is a lot  better today.  Intermittently, her cough is nonproductive.  She denies having had any dyspnea.       Review of Systems   Constitutional: Negative.  Negative for chills and fever.   HENT: Negative for ear pain, rhinorrhea and sore throat.    Respiratory: Negative.    Cardiovascular: Negative.    Musculoskeletal: Negative.    Psychiatric/Behavioral: Negative.        Physical Exam   Constitutional: She is oriented to person, place, and time. She appears well-developed and well-nourished.   HENT:   Head: Normocephalic and atraumatic.   Cardiovascular: Normal rate, regular rhythm and normal heart sounds.   Pulmonary/Chest: Effort normal and breath sounds normal. No stridor. No respiratory distress. She has no wheezes.   Neurological: She is alert and oriented to person, place, and time.   Psychiatric: She has a normal mood and affect. Her behavior is normal.   Nursing note and vitals reviewed.    Chest x-ray today showed no infiltrate.  Problem List Items Addressed This Visit        Respiratory    Acute bronchitis    Relevant Medications    albuterol sulfate  (90 Base) MCG/ACT inhaler      Other Visit Diagnoses     Cough    -  Primary    Relevant Orders    XR Chest PA & Lateral (In Office) (Completed)            PLAN  She has a persistent cough following a viral upper respiratory tract infection which is gotten better recently.  She occasionally has wheezes.  She has had a prescription for albuterol in the past to use as needed wheezing I gave her a prescription for that today.      No follow-ups on file.

## 2020-06-02 ENCOUNTER — TELEPHONE (OUTPATIENT)
Dept: ONCOLOGY | Facility: CLINIC | Age: 65
End: 2020-06-02

## 2020-06-02 NOTE — TELEPHONE ENCOUNTER
----- Message from Kassandra Spivey MA sent at 6/2/2020  1:22 PM EDT -----  Pt wants to reschedule her appointment for tomorrow. She wants to reschedule for something in March or April of next year. She doesn't mind you giving her a call to rescheduling.

## 2020-06-03 ENCOUNTER — APPOINTMENT (OUTPATIENT)
Dept: LAB | Facility: HOSPITAL | Age: 65
End: 2020-06-03

## 2021-02-27 ENCOUNTER — IMMUNIZATION (OUTPATIENT)
Dept: VACCINE CLINIC | Facility: HOSPITAL | Age: 66
End: 2021-02-27

## 2021-02-27 PROCEDURE — 0001A: CPT | Performed by: INTERNAL MEDICINE

## 2021-02-27 PROCEDURE — 91300 HC SARSCOV02 VAC 30MCG/0.3ML IM: CPT | Performed by: INTERNAL MEDICINE

## 2021-03-20 ENCOUNTER — IMMUNIZATION (OUTPATIENT)
Dept: VACCINE CLINIC | Facility: HOSPITAL | Age: 66
End: 2021-03-20

## 2021-03-20 PROCEDURE — 0002A: CPT | Performed by: INTERNAL MEDICINE

## 2021-03-20 PROCEDURE — 91300 HC SARSCOV02 VAC 30MCG/0.3ML IM: CPT | Performed by: INTERNAL MEDICINE

## 2021-12-02 ENCOUNTER — TRANSCRIBE ORDERS (OUTPATIENT)
Dept: ADMINISTRATIVE | Facility: HOSPITAL | Age: 66
End: 2021-12-02

## 2021-12-02 DIAGNOSIS — Z13.6 ENCOUNTER FOR SCREENING FOR VASCULAR DISEASE: Primary | ICD-10-CM

## 2022-05-02 ENCOUNTER — TELEPHONE (OUTPATIENT)
Dept: FAMILY MEDICINE CLINIC | Facility: CLINIC | Age: 67
End: 2022-05-02

## 2022-05-02 NOTE — TELEPHONE ENCOUNTER
Caller: Juli Manzanares    Relationship: Self    Best call back number: [phone]     What orders are you requesting (i.e. lab or imaging): YEARLY LABS     In what timeframe would the patient need to come in: A WEEK PRIOR TO AWV    Where will you receive your lab/imaging services: IN OFFICE    Additional notes: PLEASE ADVISE

## 2022-05-05 DIAGNOSIS — Z00.00 WELLNESS EXAMINATION: Primary | ICD-10-CM

## 2022-05-05 DIAGNOSIS — E78.00 HYPERCHOLESTEROLEMIA: ICD-10-CM

## 2022-05-05 DIAGNOSIS — Z13.1 SCREENING FOR DIABETES MELLITUS: ICD-10-CM

## 2022-05-24 ENCOUNTER — HOSPITAL ENCOUNTER (OUTPATIENT)
Dept: CARDIOLOGY | Facility: HOSPITAL | Age: 67
Discharge: HOME | End: 2022-05-24
Admitting: INTERNAL MEDICINE

## 2022-05-24 VITALS
DIASTOLIC BLOOD PRESSURE: 83 MMHG | SYSTOLIC BLOOD PRESSURE: 144 MMHG | BODY MASS INDEX: 28.56 KG/M2 | WEIGHT: 182 LBS | HEIGHT: 67 IN | HEART RATE: 52 BPM

## 2022-05-24 DIAGNOSIS — Z13.6 ENCOUNTER FOR SCREENING FOR VASCULAR DISEASE: ICD-10-CM

## 2022-05-24 LAB
BH CV ECHO MEAS - DIST AO DIAM: 1.46 CM
BH CV VAS BP LEFT ARM: NORMAL MMHG
BH CV VAS BP RIGHT ARM: NORMAL MMHG
BH CV XLRA MEAS - MID AO DIAM: 1.84 CM
BH CV XLRA MEAS - PAD LEFT ABI DP: 1.15
BH CV XLRA MEAS - PAD LEFT ABI PT: 1.17
BH CV XLRA MEAS - PAD LEFT ARM: 135 MMHG
BH CV XLRA MEAS - PAD LEFT LEG DP: 166 MMHG
BH CV XLRA MEAS - PAD LEFT LEG PT: 169 MMHG
BH CV XLRA MEAS - PAD RIGHT ABI DP: 1.17
BH CV XLRA MEAS - PAD RIGHT ABI PT: 1.19
BH CV XLRA MEAS - PAD RIGHT ARM: 144 MMHG
BH CV XLRA MEAS - PAD RIGHT LEG DP: 169 MMHG
BH CV XLRA MEAS - PAD RIGHT LEG PT: 171 MMHG
BH CV XLRA MEAS - PROX AO DIAM: 2.2 CM
BH CV XLRA MEAS LEFT ICA/CCA RATIO: 1.34
BH CV XLRA MEAS LEFT MID CCA PSV: NORMAL CM/SEC
BH CV XLRA MEAS LEFT MID ICA PSV: NORMAL CM/SEC
BH CV XLRA MEAS LEFT PROX ECA PSV: NORMAL CM/SEC
BH CV XLRA MEAS RIGHT ICA/CCA RATIO: 1.25
BH CV XLRA MEAS RIGHT MID CCA PSV: NORMAL CM/SEC
BH CV XLRA MEAS RIGHT MID ICA PSV: NORMAL CM/SEC
BH CV XLRA MEAS RIGHT PROX ECA PSV: NORMAL CM/SEC
MAXIMAL PREDICTED HEART RATE: 152 BPM
STRESS TARGET HR: 129 BPM

## 2022-05-24 PROCEDURE — 93799 UNLISTED CV SVC/PROCEDURE: CPT

## 2022-06-30 ENCOUNTER — APPOINTMENT (OUTPATIENT)
Dept: WOMENS IMAGING | Facility: HOSPITAL | Age: 67
End: 2022-06-30

## 2022-06-30 PROCEDURE — 77063 BREAST TOMOSYNTHESIS BI: CPT | Performed by: RADIOLOGY

## 2022-06-30 PROCEDURE — 77067 SCR MAMMO BI INCL CAD: CPT | Performed by: RADIOLOGY

## 2022-07-18 ENCOUNTER — OFFICE VISIT (OUTPATIENT)
Dept: FAMILY MEDICINE CLINIC | Facility: CLINIC | Age: 67
End: 2022-07-18

## 2022-07-18 VITALS
HEIGHT: 67 IN | WEIGHT: 183.4 LBS | HEART RATE: 71 BPM | DIASTOLIC BLOOD PRESSURE: 72 MMHG | TEMPERATURE: 98.4 F | SYSTOLIC BLOOD PRESSURE: 118 MMHG | OXYGEN SATURATION: 98 % | BODY MASS INDEX: 28.79 KG/M2

## 2022-07-18 DIAGNOSIS — Z00.00 MEDICARE ANNUAL WELLNESS VISIT, SUBSEQUENT: ICD-10-CM

## 2022-07-18 DIAGNOSIS — Z20.820 EXPOSURE TO VARICELLA: Primary | ICD-10-CM

## 2022-07-18 DIAGNOSIS — Z23 NEED FOR VACCINATION: ICD-10-CM

## 2022-07-18 DIAGNOSIS — E78.00 HYPERCHOLESTEROLEMIA: ICD-10-CM

## 2022-07-18 PROCEDURE — 1170F FXNL STATUS ASSESSED: CPT | Performed by: INTERNAL MEDICINE

## 2022-07-18 PROCEDURE — 1159F MED LIST DOCD IN RCRD: CPT | Performed by: INTERNAL MEDICINE

## 2022-07-18 PROCEDURE — G0438 PPPS, INITIAL VISIT: HCPCS | Performed by: INTERNAL MEDICINE

## 2022-07-18 RX ORDER — CETIRIZINE HYDROCHLORIDE 10 MG/1
TABLET ORAL
COMMUNITY
Start: 2020-03-01

## 2022-07-18 NOTE — PROGRESS NOTES
The ABCs of the Annual Wellness Visit  Subsequent Medicare Wellness Visit    Chief Complaint   Patient presents with   • Annual Exam      Subjective    History of Present Illness:  Juli Manzanares is a 67 y.o. female who presents for a Subsequent Medicare Wellness Visit.    The following portions of the patient's history were reviewed and   updated as appropriate: allergies, current medications, past family history, past medical history, past social history, past surgical history and problem list.    Compared to one year ago, the patient feels her physical   health is worse.    Compared to one year ago, the patient feels her mental   health is better.    Recent Hospitalizations:  She was not admitted to the hospital during the last year.       Current Medical Providers:  Patient Care Team:  Hector Keene MD as PCP - General (Internal Medicine)  Lurdes Nielson MD as Consulting Physician (Obstetrics and Gynecology)  Israel Heredia II, MD as Consulting Physician (Hematology and Oncology)    Outpatient Medications Prior to Visit   Medication Sig Dispense Refill   • cetirizine (ZyrTEC Allergy) 10 MG tablet      • BIOTIN PO Take 4,000 mcg by mouth.     • calcium citrate-vitamin d (CITRACAL) 200-250 MG-UNIT tablet tablet Take  by mouth Daily.     • Cholecalciferol (VITAMIN D3) 5000 UNITS capsule capsule Take 5,000 Units by mouth Daily.     • Multiple Vitamins-Minerals (MULTI FOR HER 50+ PO) Take  by mouth.     • albuterol sulfate  (90 Base) MCG/ACT inhaler Inhale 2 puffs Every 4 (Four) Hours As Needed for Wheezing. 1 inhaler 2   • hydrocortisone 1 % cream Apply  topically to the appropriate area as directed As Needed.     • Loratadine (CLARITIN PO) Take  by mouth As Needed.     • mometasone (ELOCON) 0.1 % cream As Needed.  1     No facility-administered medications prior to visit.       No opioid medication identified on active medication list. I have reviewed chart for other potential  high risk  "medication/s and harmful drug interactions in the elderly.          Aspirin is not on active medication list.  Aspirin use is not indicated based on review of current medical condition/s. Risk of harm outweighs potential benefits.  .    Patient Active Problem List   Diagnosis   • Female pattern hair loss   • Maxillary sinusitis   • Medicare annual wellness visit, subsequent   • Chronic low back pain   • Need for vaccination   • Hypercholesterolemia   • Leukocytopenia   • Acute bronchitis     Advance Care Planning  Advance Directive is on file.  ACP discussion was held with the patient during this visit. Patient has an advance directive in EMR which is still valid.     Review of Systems   Constitutional: Negative.    HENT: Negative.    Respiratory: Negative.    Cardiovascular: Negative.    Musculoskeletal: Negative.    Psychiatric/Behavioral: Negative.         Objective    Vitals:    07/18/22 1447   BP: 118/72   Pulse: 71   Temp: 98.4 °F (36.9 °C)   SpO2: 98%   Weight: 83.2 kg (183 lb 6.4 oz)   Height: 170.2 cm (67\")     Estimated body mass index is 28.72 kg/m² as calculated from the following:    Height as of this encounter: 170.2 cm (67\").    Weight as of this encounter: 83.2 kg (183 lb 6.4 oz).    BMI is >= 25 and <30. (Overweight) The following options were offered after discussion;: exercise counseling/recommendations      Does the patient have evidence of cognitive impairment? No    Physical Exam  Vitals and nursing note reviewed.   Constitutional:       General: She is not in acute distress.     Appearance: Normal appearance. She is not ill-appearing, toxic-appearing or diaphoretic.      Comments: Pleasant, neatly groomed, no distress.  BMI 28.   HENT:      Right Ear: Tympanic membrane, ear canal and external ear normal. There is no impacted cerumen.      Left Ear: Tympanic membrane, ear canal and external ear normal. There is no impacted cerumen.   Neck:      Vascular: No carotid bruit.   Cardiovascular:      " Rate and Rhythm: Normal rate and regular rhythm.      Heart sounds: Normal heart sounds. No murmur heard.    No gallop.   Pulmonary:      Effort: No respiratory distress.      Breath sounds: Normal breath sounds. No wheezing or rales.   Neurological:      Mental Status: She is alert and oriented to person, place, and time.   Psychiatric:         Mood and Affect: Mood normal.         Behavior: Behavior normal.         Thought Content: Thought content normal.       Lab Results   Component Value Date    CHLPL 218 (H) 07/11/2022    TRIG 63 07/11/2022    HDL 71 07/11/2022     (H) 07/11/2022    VLDL 11 07/11/2022            HEALTH RISK ASSESSMENT    Smoking Status:  Social History     Tobacco Use   Smoking Status Never Smoker   Smokeless Tobacco Never Used     Alcohol Consumption:  Social History     Substance and Sexual Activity   Alcohol Use Yes    Comment: rarely     Fall Risk Screen:    YARY Fall Risk Assessment was completed, and patient is at LOW risk for falls.Assessment completed on:7/18/2022    Depression Screening:  PHQ-2/PHQ-9 Depression Screening 7/18/2022   Retired Total Score -   Little Interest or Pleasure in Doing Things 0-->not at all   Feeling Down, Depressed or Hopeless 0-->not at all   PHQ-9: Brief Depression Severity Measure Score 0       Health Habits and Functional and Cognitive Screening:  Functional & Cognitive Status 7/18/2022   Do you have difficulty preparing food and eating? No   Do you have difficulty bathing yourself, getting dressed or grooming yourself? No   Do you have difficulty using the toilet? No   Do you have difficulty moving around from place to place? No   Do you have trouble with steps or getting out of a bed or a chair? No   Current Diet Well Balanced Diet   Dental Exam Up to date   Eye Exam Up to date   Exercise (times per week) 4 times per week   Current Exercises Include Cardiovascular Workout;Aerobics   Do you need help using the phone?  No   Are you deaf or do you  have serious difficulty hearing?  No   Do you need help with transportation? No   Do you need help shopping? No   Do you need help preparing meals?  No   Do you need help with housework?  No   Do you need help with laundry? No   Do you need help taking your medications? No   Do you need help managing money? No   Do you ever drive or ride in a car without wearing a seat belt? No   Have you felt unusual stress, anger or loneliness in the last month? No   Who do you live with? Spouse   If you need help, do you have trouble finding someone available to you? No   Have you been bothered in the last four weeks by sexual problems? No   Do you have difficulty concentrating, remembering or making decisions? No       Age-appropriate Screening Schedule:  Refer to the list below for future screening recommendations based on patient's age, sex and/or medical conditions. Orders for these recommended tests are listed in the plan section. The patient has been provided with a written plan.    Health Maintenance   Topic Date Due   • DXA SCAN  Never done   • ZOSTER VACCINE (1 of 2) Never done   • TDAP/TD VACCINES (2 - Tdap) 09/17/2014   • MAMMOGRAM  02/10/2018   • PAP SMEAR  02/10/2019   • INFLUENZA VACCINE  10/01/2022   • LIPID PANEL  07/11/2023              Assessment & Plan   CMS Preventative Services Quick Reference  Risk Factors Identified During Encounter  None Identified  The above risks/problems have been discussed with the patient.  Follow up actions/plans if indicated are seen below in the Assessment/Plan Section.  Pertinent information has been shared with the patient in the After Visit Summary.    Diagnoses and all orders for this visit:    1. Exposure to varicella (Primary)  -     Varicella zoster antibody, IgG    2. Hypercholesterolemia    3. Need for vaccination    4. Medicare annual wellness visit, subsequent      She is moderately overweight with a BMI of 28.  She needs to get regular aerobic activity per American  Heart Association guidelines of brisk aerobic activity 30 minutes a day 5 days a week.    Cutting down on her caloric intake with a goal towards achieving a BMI of 25 or less is indicated.    Her hypercholesterolemia is relatively well controlled however I would like to see her LDL cholesterol less than 130.  It was checked last in November 2019 and was less than 130 has had previous lipid profiles.  I asked her to follow-up in about 6 months.  About a week before that visit, I will check a comprehensive metabolic panel and lipid profile.    She tells me that she never had chickenpox.  She wonders therefore if it is not indicated to get a shingles vaccine.  It is indicated to get a shingles vaccine and I asked her to get that Zostavax at her pharmacy however I will check herpes zoster IgG.  Follow Up:   No follow-ups on file.     An After Visit Summary and PPPS were made available to the patient.

## 2022-08-15 ENCOUNTER — TELEPHONE (OUTPATIENT)
Dept: FAMILY MEDICINE CLINIC | Facility: CLINIC | Age: 67
End: 2022-08-15

## 2022-08-15 NOTE — TELEPHONE ENCOUNTER
Caller: Juli Manzanares    Relationship: Self    Best call back number: [phone]    What is the medical concern/diagnosis: LOWER BACK PAIN     What specialty or service is being requested: PHYSICAL THERAPY     What is the provider, practice or medical service name: [facility]     What is the office location: [place]    What is the office phone number: [phone]  FAX: [phone]

## 2022-08-19 NOTE — TELEPHONE ENCOUNTER
Caller: Juli Manzanares    Relationship: Self    Best call back number: [phone]    Who are you requesting to speak with (clinical staff, provider,  specific staff member): CLINICAL STAFF     What was the call regarding: CALLING TO CHECK STATUS OR REQUEST FOR REFERRAL     Do you require a callback: YES

## 2022-08-24 DIAGNOSIS — M54.50 MIDLINE LOW BACK PAIN, UNSPECIFIED CHRONICITY, UNSPECIFIED WHETHER SCIATICA PRESENT: Primary | ICD-10-CM

## 2022-08-29 ENCOUNTER — OFFICE VISIT (OUTPATIENT)
Dept: FAMILY MEDICINE CLINIC | Facility: CLINIC | Age: 67
End: 2022-08-29

## 2022-08-29 VITALS
TEMPERATURE: 98 F | DIASTOLIC BLOOD PRESSURE: 70 MMHG | SYSTOLIC BLOOD PRESSURE: 124 MMHG | RESPIRATION RATE: 18 BRPM | OXYGEN SATURATION: 98 % | BODY MASS INDEX: 28.25 KG/M2 | HEIGHT: 67 IN | WEIGHT: 180 LBS | HEART RATE: 76 BPM

## 2022-08-29 DIAGNOSIS — M54.50 LUMBAR SPINE PAIN: Primary | ICD-10-CM

## 2022-08-29 PROCEDURE — 99213 OFFICE O/P EST LOW 20 MIN: CPT | Performed by: INTERNAL MEDICINE

## 2022-08-30 NOTE — PROGRESS NOTES
Subjective   Juli Manzanares is a 67 y.o. female. Patient is here today for   Chief Complaint   Patient presents with   • Back Pain     LOW BACK PAIN, FEELS LIKE SHE CAN NOT STAND UP STRAIGHT          Vitals:    08/29/22 1607   BP: 124/70   Pulse: 76   Resp: 18   Temp: 98 °F (36.7 °C)   SpO2: 98%     Body mass index is 28.19 kg/m².      Past Medical History:   Diagnosis Date   • Arthritis    • Asthma    • H/O colonoscopy    • Hemorrhoids       Allergies   Allergen Reactions   • Bactrim [Sulfamethoxazole-Trimethoprim] Unknown - Low Severity   • Carbocaine [Mepivacaine Hcl] Unknown - Low Severity   • Novocain [Procaine] Unknown - Low Severity   • Penicillins Unknown - Low Severity   • Latex Rash      Social History     Socioeconomic History   • Marital status:      Spouse name: Naga   • Years of education: College   Tobacco Use   • Smoking status: Never Smoker   • Smokeless tobacco: Never Used   Substance and Sexual Activity   • Alcohol use: Yes     Comment: rarely   • Drug use: Never        Current Outpatient Medications:   •  BIOTIN PO, Take 4,000 mcg by mouth., Disp: , Rfl:   •  calcium citrate-vitamin d (CITRACAL) 200-250 MG-UNIT tablet tablet, Take  by mouth Daily., Disp: , Rfl:   •  cetirizine (zyrTEC) 10 MG tablet, , Disp: , Rfl:   •  Cholecalciferol (VITAMIN D3) 5000 UNITS capsule capsule, Take 5,000 Units by mouth Daily., Disp: , Rfl:   •  Multiple Vitamins-Minerals (MULTI FOR HER 50+ PO), Take  by mouth., Disp: , Rfl:      Objective     She has chronic low back pain.    She tells me its been going on for about a year now.  She does not note that she has any radicular pain.    She is known to have scoliosis.    Back Pain  This is a recurrent problem. The current episode started more than 1 month ago. The problem occurs constantly. The problem has been gradually worsening since onset. The pain is present in the lumbar spine. The quality of the pain is described as aching. The pain does not radiate. The  pain is at a severity of 9/10. The pain is worse during the day. The symptoms are aggravated by standing. Stiffness is present all day. Pertinent negatives include no abdominal pain, bladder incontinence, bowel incontinence, chest pain, dysuria, fever, headaches, leg pain, numbness, paresis, paresthesias, pelvic pain, perianal numbness, tingling, weakness or weight loss. Risk factors include poor posture.        Review of Systems   Constitutional: Negative for fever and weight loss.   Cardiovascular: Negative for chest pain.   Gastrointestinal: Negative for abdominal pain and bowel incontinence.   Genitourinary: Negative for bladder incontinence, dysuria and pelvic pain.   Musculoskeletal: Positive for back pain.   Neurological: Negative for tingling, weakness, numbness, headaches and paresthesias.       Physical Exam  Vitals and nursing note reviewed.   Constitutional:       Appearance: Normal appearance.      Comments: Pleasant, neatly groomed, no distress.   Neck:      Vascular: No carotid bruit.   Cardiovascular:      Rate and Rhythm: Regular rhythm.      Heart sounds: Normal heart sounds. No murmur heard.    No gallop.   Musculoskeletal:      Comments: Her lumbar and thoracic spine are nontender to percussion or palpation.   Neurological:      Mental Status: She is alert and oriented to person, place, and time.      Deep Tendon Reflexes: Reflexes normal.      Comments: Deep tendon reflexes bilateral lower extremities are 2+ and equal bilaterally patellar and Achilles.   Psychiatric:         Mood and Affect: Mood normal.         Behavior: Behavior normal.           Problems Addressed this Visit    None     Visit Diagnoses     Lumbar spine pain    -  Primary    Relevant Orders    Ambulatory Referral to Physical Therapy Evaluate and treat (Completed)      Diagnoses       Codes Comments    Lumbar spine pain    -  Primary ICD-10-CM: M54.50  ICD-9-CM: 724.2             PLAN  Make sense to me that she see physical  therapy first.    Her last image of her back was In November 2017.  After she gets some physical therapy if she fails to improve or gets worse in the meantime I will probably arrange for her to get a lumbar spine MRI.      No follow-ups on file.  Answers for HPI/ROS submitted by the patient on 8/27/2022  What is the primary reason for your visit?: Back Pain

## 2022-11-15 ENCOUNTER — TELEPHONE (OUTPATIENT)
Dept: FAMILY MEDICINE CLINIC | Facility: CLINIC | Age: 67
End: 2022-11-15

## 2022-11-15 NOTE — TELEPHONE ENCOUNTER
Caller: Juli Manzanares    Relationship to patient: Self    Best call back number: [phone]    Patient is needing: RETURNING A CALL TO [facility]

## 2022-11-16 ENCOUNTER — OFFICE VISIT (OUTPATIENT)
Dept: FAMILY MEDICINE CLINIC | Facility: CLINIC | Age: 67
End: 2022-11-16

## 2022-11-16 VITALS
HEART RATE: 66 BPM | OXYGEN SATURATION: 100 % | BODY MASS INDEX: 28.41 KG/M2 | SYSTOLIC BLOOD PRESSURE: 122 MMHG | WEIGHT: 181 LBS | HEIGHT: 67 IN | TEMPERATURE: 97.5 F | DIASTOLIC BLOOD PRESSURE: 90 MMHG

## 2022-11-16 DIAGNOSIS — M54.50 LUMBAR SPINE PAIN: Primary | ICD-10-CM

## 2022-11-16 PROCEDURE — 99213 OFFICE O/P EST LOW 20 MIN: CPT | Performed by: INTERNAL MEDICINE

## 2022-11-16 RX ORDER — CLOBETASOL PROPIONATE 0.5 MG/G
CREAM TOPICAL
COMMUNITY
Start: 2022-10-17

## 2022-11-16 NOTE — PROGRESS NOTES
Subjective   Juli Manzanares is a 67 y.o. female. Patient is here today for   Chief Complaint   Patient presents with   • Back Pain          Vitals:    11/16/22 1012   BP: 122/90   Pulse: 66   Temp: 97.5 °F (36.4 °C)   SpO2: 100%     Body mass index is 28.34 kg/m².      Past Medical History:   Diagnosis Date   • Arthritis    • Asthma    • H/O colonoscopy    • Hemorrhoids       Allergies   Allergen Reactions   • Bactrim [Sulfamethoxazole-Trimethoprim] Unknown - Low Severity   • Carbocaine [Mepivacaine Hcl] Unknown - Low Severity   • Novocain [Procaine] Unknown - Low Severity   • Penicillins Unknown - Low Severity   • Latex Rash      Social History     Socioeconomic History   • Marital status:      Spouse name: Naga   • Years of education: College   Tobacco Use   • Smoking status: Never   • Smokeless tobacco: Never   Substance and Sexual Activity   • Alcohol use: Yes     Comment: rarely   • Drug use: Never        Current Outpatient Medications:   •  BIOTIN PO, Take 4,000 mcg by mouth., Disp: , Rfl:   •  calcium citrate-vitamin d (CITRACAL) 200-250 MG-UNIT tablet tablet, Take  by mouth Daily., Disp: , Rfl:   •  cetirizine (zyrTEC) 10 MG tablet, , Disp: , Rfl:   •  Cholecalciferol (VITAMIN D3) 5000 UNITS capsule capsule, Take 5,000 Units by mouth Daily., Disp: , Rfl:   •  clobetasol (TEMOVATE) 0.05 % cream, APPLY TO AFFECTED AREA TWICE A DAY ECZEMA UP TO 2 WEEKS, Disp: , Rfl:   •  Multiple Vitamins-Minerals (MULTI FOR HER 50+ PO), Take  by mouth., Disp: , Rfl:      Objective     History of Present Illness  She is here to follow-up on lumbar spine pain.  I saw her recently and arranged for her to have physical therapy.  The physical therapy documentation notes that she is improved considerably since prior to physical therapy.  She agrees with that but she feels that her lumbar spine pain is still significant and she would like to know what else we can do about that.    She has an occasional general pain radiation  to the left hip.      Back Pain  This is a recurrent problem. The current episode started more than 1 year ago. The problem occurs constantly. The problem has been gradually worsening since onset. The pain is present in the sacro-iliac. The quality of the pain is described as aching. The pain does not radiate. The pain is at a severity of 8/10. The pain is worse during the day. The symptoms are aggravated by bending and standing. Stiffness is present all day. Pertinent negatives include no abdominal pain, bladder incontinence, bowel incontinence, chest pain, dysuria, fever, headaches, leg pain, numbness, paresis, paresthesias, pelvic pain, perianal numbness, tingling, weakness or weight loss. Risk factors include poor posture.        Review of Systems   Constitutional: Negative for fever and weight loss.   Cardiovascular: Negative for chest pain.   Gastrointestinal: Negative for abdominal pain and bowel incontinence.   Genitourinary: Negative for bladder incontinence, dysuria and pelvic pain.   Musculoskeletal: Positive for back pain.   Neurological: Negative for tingling, weakness, numbness, headaches and paresthesias.   Psychiatric/Behavioral: Negative.        Physical Exam  Vitals and nursing note reviewed.   Constitutional:       Appearance: Normal appearance.      Comments: Pleasant, neatly groomed, no distress.   Cardiovascular:      Rate and Rhythm: Regular rhythm.      Heart sounds: Normal heart sounds. No murmur heard.    No gallop.   Pulmonary:      Effort: No respiratory distress.      Breath sounds: Normal breath sounds. No wheezing.   Skin:     Coloration: Skin is not pale.   Neurological:      Mental Status: She is alert and oriented to person, place, and time.   Psychiatric:         Mood and Affect: Mood normal.         Behavior: Behavior normal.         Thought Content: Thought content normal.           Problems Addressed this Visit    None  Visit Diagnoses     Lumbar spine pain    -  Primary     Relevant Orders    MRI Lumbar Spine Without Contrast      Diagnoses       Codes Comments    Lumbar spine pain    -  Primary ICD-10-CM: M54.50  ICD-9-CM: 724.2             PLAN  She has had pain for such a long time in her lumbar spine now she has a left hip radicular pain.    She had a plain x-ray of her lumbar spine 5 years ago.  I think it is time that we did an MRI on her lumbar spine.    She worked with physical therapy and she improved somewhat but she still has significant pain.    I asked her to make a telephone visit with me to discuss the results of her MRI when the results are available.  When she finds out the date of her MRI I asked her to make a telephone visit with me a couple days later.  No follow-ups on file.  Answers for HPI/ROS submitted by the patient on 11/15/2022  What is the primary reason for your visit?: Back Pain

## 2022-12-12 ENCOUNTER — HOSPITAL ENCOUNTER (OUTPATIENT)
Dept: MRI IMAGING | Facility: HOSPITAL | Age: 67
Discharge: HOME | End: 2022-12-12
Admitting: INTERNAL MEDICINE

## 2022-12-12 DIAGNOSIS — M54.50 LUMBAR SPINE PAIN: ICD-10-CM

## 2022-12-12 PROCEDURE — 72148 MRI LUMBAR SPINE W/O DYE: CPT

## 2022-12-28 ENCOUNTER — OFFICE VISIT (OUTPATIENT)
Dept: FAMILY MEDICINE CLINIC | Facility: CLINIC | Age: 67
End: 2022-12-28

## 2022-12-28 ENCOUNTER — HOSPITAL ENCOUNTER (OUTPATIENT)
Dept: GENERAL RADIOLOGY | Facility: HOSPITAL | Age: 67
Discharge: HOME | End: 2022-12-28
Admitting: INTERNAL MEDICINE

## 2022-12-28 ENCOUNTER — TELEPHONE (OUTPATIENT)
Dept: ORTHOPEDIC SURGERY | Facility: CLINIC | Age: 67
End: 2022-12-28

## 2022-12-28 VITALS
TEMPERATURE: 97.1 F | OXYGEN SATURATION: 99 % | SYSTOLIC BLOOD PRESSURE: 106 MMHG | BODY MASS INDEX: 27.28 KG/M2 | WEIGHT: 180 LBS | HEART RATE: 74 BPM | HEIGHT: 68 IN | DIASTOLIC BLOOD PRESSURE: 68 MMHG

## 2022-12-28 DIAGNOSIS — M25.551 BILATERAL HIP PAIN: ICD-10-CM

## 2022-12-28 DIAGNOSIS — M25.551 BILATERAL HIP PAIN: Primary | ICD-10-CM

## 2022-12-28 DIAGNOSIS — M25.552 BILATERAL HIP PAIN: ICD-10-CM

## 2022-12-28 DIAGNOSIS — M54.50 LUMBAR SPINE PAIN: ICD-10-CM

## 2022-12-28 DIAGNOSIS — M25.552 BILATERAL HIP PAIN: Primary | ICD-10-CM

## 2022-12-28 PROCEDURE — 99213 OFFICE O/P EST LOW 20 MIN: CPT | Performed by: INTERNAL MEDICINE

## 2022-12-28 PROCEDURE — 73521 X-RAY EXAM HIPS BI 2 VIEWS: CPT

## 2022-12-28 RX ORDER — MELOXICAM 7.5 MG/1
7.5 TABLET ORAL DAILY
Qty: 30 TABLET | Refills: 1 | Status: SHIPPED | OUTPATIENT
Start: 2022-12-28 | End: 2023-02-24

## 2022-12-28 NOTE — TELEPHONE ENCOUNTER
INTERNAL REF FROM [facility] - DX Lumbar spine pain - MRI 12/12/22 - PN 12/28/22 -CAN RHIANNON SEE OR IS THIS OKAY TO WAIT FOR JAY[facility]'S NEXT NEW OPENING?

## 2022-12-28 NOTE — PROGRESS NOTES
Subjective   Juli Manzanares is a 67 y.o. female. Patient is here today for   Chief Complaint   Patient presents with   • Back Pain     Mri 12/12          Vitals:    12/28/22 0801   BP: 106/68   Pulse: 74   Temp: 97.1 °F (36.2 °C)   SpO2: 99%     Body mass index is 27.78 kg/m².      Past Medical History:   Diagnosis Date   • Arthritis    • Asthma    • H/O colonoscopy    • Hemorrhoids       Allergies   Allergen Reactions   • Bactrim [Sulfamethoxazole-Trimethoprim] Unknown - Low Severity   • Carbocaine [Mepivacaine Hcl] Unknown - Low Severity   • Novocain [Procaine] Unknown - Low Severity   • Penicillins Unknown - Low Severity   • Latex Rash      Social History     Socioeconomic History   • Marital status:      Spouse name: Naga   • Years of education: College   Tobacco Use   • Smoking status: Never   • Smokeless tobacco: Never   Substance and Sexual Activity   • Alcohol use: Yes     Comment: rarely   • Drug use: Never        Current Outpatient Medications:   •  BIOTIN PO, Take 4,000 mcg by mouth., Disp: , Rfl:   •  calcium citrate-vitamin d (CITRACAL) 200-250 MG-UNIT tablet tablet, Take  by mouth Daily., Disp: , Rfl:   •  Cholecalciferol (VITAMIN D3) 5000 UNITS capsule capsule, Take 5,000 Units by mouth Daily., Disp: , Rfl:   •  clobetasol (TEMOVATE) 0.05 % cream, APPLY TO AFFECTED AREA TWICE A DAY ECZEMA UP TO 2 WEEKS, Disp: , Rfl:   •  Multiple Vitamins-Minerals (MULTI FOR HER 50+ PO), Take  by mouth., Disp: , Rfl:   •  cetirizine (zyrTEC) 10 MG tablet, , Disp: , Rfl:   •  meloxicam (Mobic) 7.5 MG tablet, Take 1 tablet by mouth Daily., Disp: 30 tablet, Rfl: 1     Objective     History of Present Illness  This patient has had some lumbar spine pain for years actually.  She has scoliosis.  I saw her a month and a half ago so it ordered her to get an MRI of her lumbar spine.  She is here today to discuss the results.    Her pain is especially gotten worse over the last 6 months.  She will wake up and she will  be pain-free for a moment but she gets up and starts moving around her back pain becomes significant pretty soon thereafter.  She denies any radicular complaints.  She notes no hip pain though her  who accompanies her today is 2 friends that had lumbar spine pain that actually went up having arthritis in her hips and he wonders if it would be appropriate for her to get an x-ray of her hips.      Back Pain  This is a chronic problem. The current episode started more than 1 year ago. The problem occurs constantly. The problem has been gradually worsening since onset. The pain is present in the sacro-iliac. The quality of the pain is described as aching. The pain does not radiate. The pain is at a severity of 9/10. The pain is the same all the time. The symptoms are aggravated by bending. Stiffness is present all day. Pertinent negatives include no abdominal pain, bladder incontinence, bowel incontinence, chest pain, dysuria, fever, headaches, leg pain, numbness, paresis, paresthesias, pelvic pain, perianal numbness, tingling, weakness or weight loss. Risk factors include history of osteoporosis and poor posture.        Review of Systems   Constitutional: Negative for fever and weight loss.   Cardiovascular: Negative for chest pain.   Gastrointestinal: Negative for abdominal pain and bowel incontinence.   Genitourinary: Negative for bladder incontinence, dysuria and pelvic pain.   Musculoskeletal: Positive for back pain.   Neurological: Negative for tingling, weakness, numbness, headaches and paresthesias.       Physical Exam  Vitals and nursing note reviewed.   Constitutional:       General: She is not in acute distress.     Appearance: Normal appearance. She is not ill-appearing, toxic-appearing or diaphoretic.      Comments: Pleasant, neatly groomed, no distress.   Neurological:      Mental Status: She is alert and oriented to person, place, and time.   Psychiatric:         Mood and Affect: Mood normal.          Behavior: Behavior normal.         Thought Content: Thought content normal.           Problems Addressed this Visit    None  Visit Diagnoses     Bilateral hip pain    -  Primary    Relevant Orders    XR Hips Bilateral With or Without Pelvis 2 View    Lumbar spine pain        Relevant Orders    Ambulatory Referral to Orthopedic Surgery      Diagnoses       Codes Comments    Bilateral hip pain    -  Primary ICD-10-CM: M25.551, M25.552  ICD-9-CM: 719.45     Lumbar spine pain     ICD-10-CM: M54.50  ICD-9-CM: 724.2             PLAN  She and I discussed the results of her MRI of the lumbar spine on December 14.    She had asked tensive multilevel degenerative disease involving the lumbar spine.  She had moderate to severe rotatory levoscoliosis of the lumbar spine with apex at the level of L3 with compensatory rotatory dextroscoliosis of the thoracolumbar region.    She grade 1 anterolisthesis of L5 on S1.  Bilateral L5 pars defects were noted.    She had disc desiccation, loss of disc height and multilevel facet degenerative disease without evidence of focal herniation or compression fracture.    She had physical therapy as recently as September to address her lumbar spine pain.  She continues to do her physical therapy exercises.    She has been reluctant to take medication for pain and certainly she does not have to if she does not want to but her pain is quite severe.  I sent out a prescription for meloxicam for her to use as needed.    Get an x-ray of her hips bilaterally to rule them out as a source for her lumbar spine pain.    I sent out a referral for orthopedic surgery evaluation.  No follow-ups on file.  Answers for HPI/ROS submitted by the patient on 12/21/2022  What is the primary reason for your visit?: Back Pain

## 2022-12-30 ENCOUNTER — APPOINTMENT (OUTPATIENT)
Dept: GENERAL RADIOLOGY | Facility: HOSPITAL | Age: 67
End: 2022-12-30

## 2022-12-30 ENCOUNTER — NURSE TRIAGE (OUTPATIENT)
Dept: CALL CENTER | Facility: HOSPITAL | Age: 67
End: 2022-12-30

## 2022-12-30 LAB
ABO GROUP BLD: NORMAL
ALBUMIN SERPL-MCNC: 4.7 G/DL (ref 3.5–5.2)
ALBUMIN/GLOB SERPL: 2 G/DL
ALP SERPL-CCNC: 80 U/L (ref 39–117)
ALT SERPL W P-5'-P-CCNC: 23 U/L (ref 1–33)
ANION GAP SERPL CALCULATED.3IONS-SCNC: 11.3 MMOL/L (ref 5–15)
AST SERPL-CCNC: 30 U/L (ref 1–32)
BASOPHILS # BLD AUTO: 0.02 10*3/MM3 (ref 0–0.2)
BASOPHILS NFR BLD AUTO: 0.2 % (ref 0–1.5)
BILIRUB SERPL-MCNC: 0.7 MG/DL (ref 0–1.2)
BLD GP AB SCN SERPL QL: NEGATIVE
BUN SERPL-MCNC: 17 MG/DL (ref 8–23)
BUN/CREAT SERPL: 20 (ref 7–25)
CALCIUM SPEC-SCNC: 9.2 MG/DL (ref 8.6–10.5)
CHLORIDE SERPL-SCNC: 102 MMOL/L (ref 98–107)
CO2 SERPL-SCNC: 24.7 MMOL/L (ref 22–29)
CREAT SERPL-MCNC: 0.85 MG/DL (ref 0.57–1)
D-LACTATE SERPL-SCNC: 1.3 MMOL/L (ref 0.5–2)
DEPRECATED RDW RBC AUTO: 40.8 FL (ref 37–54)
EGFRCR SERPLBLD CKD-EPI 2021: 74.3 ML/MIN/1.73
EOSINOPHIL # BLD AUTO: 0.06 10*3/MM3 (ref 0–0.4)
EOSINOPHIL NFR BLD AUTO: 0.6 % (ref 0.3–6.2)
ERYTHROCYTE [DISTWIDTH] IN BLOOD BY AUTOMATED COUNT: 12.9 % (ref 12.3–15.4)
GLOBULIN UR ELPH-MCNC: 2.4 GM/DL
GLUCOSE SERPL-MCNC: 128 MG/DL (ref 65–99)
HCT VFR BLD AUTO: 44.6 % (ref 34–46.6)
HGB BLD-MCNC: 13.8 G/DL (ref 12–15.9)
HOLD SPECIMEN: NORMAL
HOLD SPECIMEN: NORMAL
IMM GRANULOCYTES # BLD AUTO: 0.03 10*3/MM3 (ref 0–0.05)
IMM GRANULOCYTES NFR BLD AUTO: 0.3 % (ref 0–0.5)
LYMPHOCYTES # BLD AUTO: 1.44 10*3/MM3 (ref 0.7–3.1)
LYMPHOCYTES NFR BLD AUTO: 13.7 % (ref 19.6–45.3)
MCH RBC QN AUTO: 26.9 PG (ref 26.6–33)
MCHC RBC AUTO-ENTMCNC: 30.9 G/DL (ref 31.5–35.7)
MCV RBC AUTO: 86.9 FL (ref 79–97)
MONOCYTES # BLD AUTO: 0.82 10*3/MM3 (ref 0.1–0.9)
MONOCYTES NFR BLD AUTO: 7.8 % (ref 5–12)
NEUTROPHILS NFR BLD AUTO: 77.4 % (ref 42.7–76)
NEUTROPHILS NFR BLD AUTO: 8.14 10*3/MM3 (ref 1.7–7)
NRBC BLD AUTO-RTO: 0 /100 WBC (ref 0–0.2)
PLATELET # BLD AUTO: 220 10*3/MM3 (ref 140–450)
PMV BLD AUTO: 9.1 FL (ref 6–12)
POTASSIUM SERPL-SCNC: 4 MMOL/L (ref 3.5–5.2)
PROT SERPL-MCNC: 7.1 G/DL (ref 6–8.5)
RBC # BLD AUTO: 5.13 10*6/MM3 (ref 3.77–5.28)
RH BLD: NEGATIVE
SODIUM SERPL-SCNC: 138 MMOL/L (ref 136–145)
T&S EXPIRATION DATE: NORMAL
WBC NRBC COR # BLD: 10.51 10*3/MM3 (ref 3.4–10.8)
WHOLE BLOOD HOLD COAG: NORMAL
WHOLE BLOOD HOLD SPECIMEN: NORMAL

## 2022-12-30 PROCEDURE — 85025 COMPLETE CBC W/AUTO DIFF WBC: CPT

## 2022-12-30 PROCEDURE — 83605 ASSAY OF LACTIC ACID: CPT

## 2022-12-30 PROCEDURE — 86900 BLOOD TYPING SEROLOGIC ABO: CPT

## 2022-12-30 PROCEDURE — 99284 EMERGENCY DEPT VISIT MOD MDM: CPT

## 2022-12-30 PROCEDURE — 86901 BLOOD TYPING SEROLOGIC RH(D): CPT

## 2022-12-30 PROCEDURE — 36415 COLL VENOUS BLD VENIPUNCTURE: CPT

## 2022-12-30 PROCEDURE — 80053 COMPREHEN METABOLIC PANEL: CPT

## 2022-12-30 PROCEDURE — 86850 RBC ANTIBODY SCREEN: CPT

## 2022-12-30 RX ORDER — SODIUM CHLORIDE 0.9 % (FLUSH) 0.9 %
10 SYRINGE (ML) INJECTION AS NEEDED
Status: DISCONTINUED | OUTPATIENT
Start: 2022-12-30 | End: 2022-12-31 | Stop reason: HOSPADM

## 2022-12-30 NOTE — TELEPHONE ENCOUNTER
"    Reason for Disposition  • [1] Blood in the stool AND [2] moderate or large amount of blood    Additional Information  • Negative: Shock suspected (e.g., cold/pale/clammy skin, too weak to stand, low BP, rapid pulse)  • Negative: Difficult to awaken or acting confused (e.g., disoriented, slurred speech)  • Negative: Sounds like a life-threatening emergency to the triager  • Negative: Vomiting also present and worse than the diarrhea  • Negative: [1] Blood in stool AND [2] without diarrhea  • Negative: Diarrhea in a cancer patient who is currently (or recently) receiving chemotherapy or radiation therapy, or cancer patient who has metastatic or end-stage cancer and is receiving palliative care  • Negative: [1] SEVERE abdominal pain (e.g., excruciating) AND [2] present > 1 hour  • Negative: [1] SEVERE abdominal pain AND [2] age > 60 years    Answer Assessment - Initial Assessment Questions  1. DIARRHEA SEVERITY: \"How bad is the diarrhea?\" \"How many extra stools have you had in the past 24 hours than normal?\"     - NO DIARRHEA (SCALE 0)    - MILD (SCALE 1-3): Few loose or mushy BMs; increase of 1-3 stools over normal daily number of stools; mild increase in ostomy output.    -  MODERATE (SCALE 4-7): Increase of 4-6 stools daily over normal; moderate increase in ostomy output.  * SEVERE (SCALE 8-10; OR 'WORST POSSIBLE'): Increase of 7 or more stools daily over normal; moderate increase in ostomy output; incontinence.      moderate  2. ONSET: \"When did the diarrhea begin?\"       Last night  3. BM CONSISTENCY: \"How loose or watery is the diarrhea?\"       Watery with blood in it  4. VOMITING: \"Are you also vomiting?\" If Yes, ask: \"How many times in the past 24 hours?\"     No vomiting today did vomit once this morning  5. ABDOMINAL PAIN: \"Are you having any abdominal pain?\" If Yes, ask: \"What does it feel like?\" (e.g., crampy, dull, intermittent, constant)       *yes comes and goes6. ABDOMINAL PAIN SEVERITY: If present, " "ask: \"How bad is the pain?\"  (e.g., Scale 1-10; mild, moderate, or severe)    - MILD (1-3): doesn't interfere with normal activities, abdomen soft and not tender to touch     - MODERATE (4-7): interferes with normal activities or awakens from sleep, tender to touch     - SEVERE (8-10): excruciating pain, doubled over, unable to do any normal activities        moderate  7. ORAL INTAKE: If vomiting, \"Have you been able to drink liquids?\" \"How much fluids have you had in the past 24 hours?\"     gingerale  8. HYDRATION: \"Any signs of dehydration?\" (e.g., dry mouth [not just dry lips], too weak to stand, dizziness, new weight loss) \"When did you last urinate?\"     Just urination.  9. EXPOSURE: \"Have you traveled to a foreign country recently?\" \"Have you been exposed to anyone with diarrhea?\" \"Could you have eaten any food that was spoiled?\"      no  10. ANTIBIOTIC USE: \"Are you taking antibiotics now or have you taken antibiotics in the past 2 months?\"       no  11. OTHER SYMPTOMS: \"Do you have any other symptoms?\" (e.g., fever, blood in stool)       Blood in stools  12. PREGNANCY: \"Is there any chance you are pregnant?\" \"When was your last menstrual period?\"       no    Protocols used: DIARRHEA-ADULT-AH      "

## 2022-12-31 ENCOUNTER — HOSPITAL ENCOUNTER (OUTPATIENT)
Facility: HOSPITAL | Age: 67
Setting detail: OBSERVATION
Discharge: HOME | End: 2022-12-31
Attending: EMERGENCY MEDICINE | Admitting: PHYSICIAN ASSISTANT
Payer: MEDICARE

## 2022-12-31 ENCOUNTER — READMISSION MANAGEMENT (OUTPATIENT)
Dept: CALL CENTER | Facility: HOSPITAL | Age: 67
End: 2022-12-31

## 2022-12-31 VITALS
BODY MASS INDEX: 29.03 KG/M2 | WEIGHT: 185 LBS | HEART RATE: 87 BPM | OXYGEN SATURATION: 97 % | SYSTOLIC BLOOD PRESSURE: 126 MMHG | DIASTOLIC BLOOD PRESSURE: 78 MMHG | RESPIRATION RATE: 18 BRPM | TEMPERATURE: 98.7 F | HEIGHT: 67 IN

## 2022-12-31 DIAGNOSIS — K92.2 ACUTE LOWER GI BLEEDING: Primary | ICD-10-CM

## 2022-12-31 PROBLEM — K62.5 RECTAL BLEEDING: Status: ACTIVE | Noted: 2022-12-31

## 2022-12-31 LAB
ANION GAP SERPL CALCULATED.3IONS-SCNC: 11.4 MMOL/L (ref 5–15)
BASOPHILS # BLD AUTO: 0.02 10*3/MM3 (ref 0–0.2)
BASOPHILS NFR BLD AUTO: 0.2 % (ref 0–1.5)
BUN SERPL-MCNC: 15 MG/DL (ref 8–23)
BUN/CREAT SERPL: 18.5 (ref 7–25)
CALCIUM SPEC-SCNC: 9.3 MG/DL (ref 8.6–10.5)
CHLORIDE SERPL-SCNC: 103 MMOL/L (ref 98–107)
CO2 SERPL-SCNC: 21.6 MMOL/L (ref 22–29)
CREAT SERPL-MCNC: 0.81 MG/DL (ref 0.57–1)
DEPRECATED RDW RBC AUTO: 39.1 FL (ref 37–54)
DEPRECATED RDW RBC AUTO: 41.8 FL (ref 37–54)
EGFRCR SERPLBLD CKD-EPI 2021: 78.7 ML/MIN/1.73
EOSINOPHIL # BLD AUTO: 0.12 10*3/MM3 (ref 0–0.4)
EOSINOPHIL NFR BLD AUTO: 1.4 % (ref 0.3–6.2)
ERYTHROCYTE [DISTWIDTH] IN BLOOD BY AUTOMATED COUNT: 12.8 % (ref 12.3–15.4)
ERYTHROCYTE [DISTWIDTH] IN BLOOD BY AUTOMATED COUNT: 13.1 % (ref 12.3–15.4)
GLUCOSE SERPL-MCNC: 115 MG/DL (ref 65–99)
HCT VFR BLD AUTO: 36.2 % (ref 34–46.6)
HCT VFR BLD AUTO: 45.3 % (ref 34–46.6)
HGB BLD-MCNC: 12.1 G/DL (ref 12–15.9)
HGB BLD-MCNC: 14.3 G/DL (ref 12–15.9)
IMM GRANULOCYTES # BLD AUTO: 0.02 10*3/MM3 (ref 0–0.05)
IMM GRANULOCYTES NFR BLD AUTO: 0.2 % (ref 0–0.5)
LYMPHOCYTES # BLD AUTO: 1.39 10*3/MM3 (ref 0.7–3.1)
LYMPHOCYTES NFR BLD AUTO: 16.2 % (ref 19.6–45.3)
MCH RBC QN AUTO: 27.7 PG (ref 26.6–33)
MCH RBC QN AUTO: 27.9 PG (ref 26.6–33)
MCHC RBC AUTO-ENTMCNC: 31.6 G/DL (ref 31.5–35.7)
MCHC RBC AUTO-ENTMCNC: 33.4 G/DL (ref 31.5–35.7)
MCV RBC AUTO: 83.4 FL (ref 79–97)
MCV RBC AUTO: 87.8 FL (ref 79–97)
MONOCYTES # BLD AUTO: 0.83 10*3/MM3 (ref 0.1–0.9)
MONOCYTES NFR BLD AUTO: 9.7 % (ref 5–12)
NEUTROPHILS NFR BLD AUTO: 6.22 10*3/MM3 (ref 1.7–7)
NEUTROPHILS NFR BLD AUTO: 72.3 % (ref 42.7–76)
NRBC BLD AUTO-RTO: 0 /100 WBC (ref 0–0.2)
PLATELET # BLD AUTO: 169 10*3/MM3 (ref 140–450)
PLATELET # BLD AUTO: 173 10*3/MM3 (ref 140–450)
PMV BLD AUTO: 9.5 FL (ref 6–12)
PMV BLD AUTO: 9.8 FL (ref 6–12)
POTASSIUM SERPL-SCNC: 3.8 MMOL/L (ref 3.5–5.2)
RBC # BLD AUTO: 4.34 10*6/MM3 (ref 3.77–5.28)
RBC # BLD AUTO: 5.16 10*6/MM3 (ref 3.77–5.28)
SODIUM SERPL-SCNC: 136 MMOL/L (ref 136–145)
WBC NRBC COR # BLD: 10.23 10*3/MM3 (ref 3.4–10.8)
WBC NRBC COR # BLD: 8.6 10*3/MM3 (ref 3.4–10.8)

## 2022-12-31 PROCEDURE — 99204 OFFICE O/P NEW MOD 45 MIN: CPT | Performed by: INTERNAL MEDICINE

## 2022-12-31 PROCEDURE — G0378 HOSPITAL OBSERVATION PER HR: HCPCS

## 2022-12-31 PROCEDURE — 85025 COMPLETE CBC W/AUTO DIFF WBC: CPT | Performed by: PHYSICIAN ASSISTANT

## 2022-12-31 PROCEDURE — 80048 BASIC METABOLIC PNL TOTAL CA: CPT

## 2022-12-31 PROCEDURE — 85027 COMPLETE CBC AUTOMATED: CPT

## 2022-12-31 PROCEDURE — 96374 THER/PROPH/DIAG INJ IV PUSH: CPT

## 2022-12-31 RX ORDER — NITROGLYCERIN 0.4 MG/1
0.4 TABLET SUBLINGUAL
Status: DISCONTINUED | OUTPATIENT
Start: 2022-12-31 | End: 2022-12-31 | Stop reason: HOSPADM

## 2022-12-31 RX ORDER — PANTOPRAZOLE SODIUM 40 MG/10ML
40 INJECTION, POWDER, LYOPHILIZED, FOR SOLUTION INTRAVENOUS EVERY 12 HOURS SCHEDULED
Status: DISCONTINUED | OUTPATIENT
Start: 2022-12-31 | End: 2022-12-31 | Stop reason: HOSPADM

## 2022-12-31 RX ORDER — ONDANSETRON 2 MG/ML
4 INJECTION INTRAMUSCULAR; INTRAVENOUS EVERY 6 HOURS PRN
Status: DISCONTINUED | OUTPATIENT
Start: 2022-12-31 | End: 2022-12-31 | Stop reason: HOSPADM

## 2022-12-31 RX ADMIN — PANTOPRAZOLE SODIUM 40 MG: 40 INJECTION, POWDER, FOR SOLUTION INTRAVENOUS at 03:21

## 2022-12-31 NOTE — ED TRIAGE NOTES
Patient to ER via car from home for bright red rectal bleeding x today    Patient wearing mask this RN in PPE

## 2022-12-31 NOTE — CONSULTS
Gastroenterology   Initial Inpatient Consult Note  Thank you for asking our opinion on this patient.  Referring Provider: Ronald Camacho MD    Reason for Consultation: Rectal bleeding    Subjective     History of present illness:    67 y.o. female that we are asked to see for rectal bleeding.  Patient has a history of internal hemorrhoids.  She states that therefore yesterday she experienced multiple diarrheal stools without any abdominal pain.  She then noted the passage of bright red fresh blood dripping into the toilet.  This happened on several occasions without any bowel movement.  Since she has been here in the hospital she has not had further episodes.  She denies to me any abdominal cramping or pain or fever or chills.  No prior history of GI bleeding.  She denies any hematemesis.  No melena reported.    Past Medical History:  Past Medical History:   Diagnosis Date   • Arthritis    • Asthma    • H/O colonoscopy    • Hemorrhoids      Past Surgical History:  Past Surgical History:   Procedure Laterality Date   • COLONOSCOPY N/A 7/12/2017    Procedure: COLONOSCOPY into cecum;  Surgeon: Farida Enriquez MD;  Location: [facility] ENDOSCOPY;  Service:    • SKIN GRAFT      LOWER MOUTH    • WISDOM TOOTH EXTRACTION        Social History:   Social History     Tobacco Use   • Smoking status: Never   • Smokeless tobacco: Never   Substance Use Topics   • Alcohol use: Yes     Comment: rarely      Family History:  Family History   Problem Relation Age of Onset   • Cancer Mother    • Lung cancer Mother    • Hypertension Father    • Diabetes Father    • Kidney disease Father    • Prostate cancer Father    • Cancer Cousin        Home Meds:  Medications Prior to Admission   Medication Sig Dispense Refill Last Dose   • BIOTIN PO Take 4,000 mcg by mouth.   12/30/2022   • calcium citrate-vitamin d (CITRACAL) 200-250 MG-UNIT tablet tablet Take  by mouth Daily.   12/30/2022   • Cholecalciferol (VITAMIN D3) 5000 UNITS capsule capsule  Take 5,000 Units by mouth Daily.   12/30/2022   • Multiple Vitamins-Minerals (MULTI FOR HER 50+ PO) Take  by mouth.   12/30/2022   • cetirizine (zyrTEC) 10 MG tablet    Unknown   • clobetasol (TEMOVATE) 0.05 % cream APPLY TO AFFECTED AREA TWICE A DAY ECZEMA UP TO 2 WEEKS   Unknown   • meloxicam (Mobic) 7.5 MG tablet Take 1 tablet by mouth Daily. 30 tablet 1      Current Meds:   pantoprazole, 40 mg, Intravenous, Q12H      Allergies:  Allergies   Allergen Reactions   • Bactrim [Sulfamethoxazole-Trimethoprim] Unknown - Low Severity   • Carbocaine [Mepivacaine Hcl] Unknown - Low Severity   • Novocain [Procaine] Unknown - Low Severity   • Penicillins Unknown - Low Severity   • Latex Rash     Review of Systems  Pertinent items are noted in HPI, all other systems reviewed and negative    Objective     Vital Signs  Temp:  [97.6 °F (36.4 °C)-99.2 °F (37.3 °C)] 98.7 °F (37.1 °C)  Heart Rate:  [] 69  Resp:  [18-22] 18  BP: (120-151)/(66-92) 120/66    Physical Exam:  CONSTITUTIONAL:  today's vital signs reviewed  EARS NOSE THROAT: trachea midline and no deformity of the nares  EYES: no scleral icterus  GASTROINTESTINAL: abdomen is soft, nontender, nondistended with normal active bowel sounds, no masses are appreciated  PSYCHIATRIC: appropriate mood and affect  RESPIRATORY: normal inspiratory effort with no increased work of breathing  NEUROLOGIC: patient is awake and alert  DERMATOLOGIC: skin is warm with no cyanosis  LYMPHATIC: no periumbilical lymphadenopathy     Results Review:   I reviewed the patient's new clinical results.    Results from last 7 days   Lab Units 12/31/22 0313 12/30/22 2102   WBC 10*3/mm3 10.23 10.51   HEMOGLOBIN g/dL 14.3 13.8   HEMATOCRIT % 45.3 44.6   PLATELETS 10*3/mm3 169 220     Results from last 7 days   Lab Units 12/31/22 0313 12/30/22 2102   SODIUM mmol/L 136 138   POTASSIUM mmol/L 3.8 4.0   CHLORIDE mmol/L 103 102   CO2 mmol/L 21.6* 24.7   BUN mg/dL 15 17   CREATININE mg/dL 0.81 0.85    CALCIUM mg/dL 9.3 9.2   BILIRUBIN mg/dL  --  0.7   ALK PHOS U/L  --  80   ALT (SGPT) U/L  --  23   AST (SGOT) U/L  --  30   GLUCOSE mg/dL 115* 128*         No results found for: LIPASE    Radiology:  No orders to display       Assessment & Plan   Patient Active Problem List   Diagnosis   • Female pattern hair loss   • Maxillary sinusitis   • Medicare annual wellness visit, subsequent   • Chronic low back pain   • Need for vaccination   • Hypercholesterolemia   • Leukocytopenia   • Acute bronchitis   • Rectal bleeding       Assessment:  1. Rectal bleeding.  Patient's bleeding is consistent with bleeding from internal hemorrhoids which are likely exacerbated by multiple bouts of diarrhea.  The diarrhea and bleeding have both now resolved.  Her hemoglobin is stable.  2. History of internal hemorrhoids    Plan:  · I think patient's rectal bleeding is likely secondary to an anorectal source such as her internal hemorrhoids.  The bleeding has clinically resolved.  Symptoms do not suggest ischemic colitis as she does not have any abdominal pain and she has no fever and white count is normal.  Her hemoglobin is at baseline.  She denies any current abdominal pain or diarrhea or further symptoms.  I do not think she needs endoscopic evaluation at this point.  If she has no further episodes over the next several hours of observation I think it would be reasonable to discharge her home with outpatient follow-up.  If patient develops abdominal pain or has recurrent bouts of diarrhea with significant bleeding then consider endoscopic evaluation at that point but again the presenting symptoms seem to have resolved and see no need for urgent endoscopic intervention at this point.      I discussed the patients findings and my recommendations with patient, family and nursing staff.    MD Josh Kilpatrick M.D.  [facility] [id number] [place], [place]  [address]  Office: [phone]

## 2022-12-31 NOTE — PLAN OF CARE
Goal Outcome Evaluation:   Plan of care reviewed with patient   Outcome summary: Patient reports decrease in bleeding, vitals stable, trending hemoglobin, up without assist, NPO, plan for GI to see

## 2022-12-31 NOTE — H&P
[facility]   HISTORY AND PHYSICAL    Patient Name: Juli Manzanares  : 1955  MRN: [id number]  Primary Care Physician:  Hector Keene MD  Date of admission: 2022    Subjective   Subjective     Chief Complaint: Lower GI bleed    History of Present Illness  Juli Manzanares is a 67-year-old female with a past medical history of arthritis, asthma, and hemorrhoids, that presented to the emergency after having an episode of diarrhea stool with bright red blood.  She states that she had had diarrhea Thursday night into Friday afternoon and went to the bathroom and had diarrhea stool with blood that she states\" sank to the bottom of the commode\".  She states that she continues to have blood on the tissue after she wipes but very little blood in the toilet at this time.  She denies fever, chills, abdominal pain, nausea, vomiting, or dysuria.  Her initial hemoglobin in the emergency department was 3.8 with hematocrit of 44.6.  Labs completed at  15 show a hemoglobin of 14.3 and a hematocrit of 45.3.  Review of Systems   Constitutional: Negative.  Negative for chills and fever.   HENT: Negative.    Eyes: Negative.    Respiratory: Negative.  Negative for shortness of breath.    Cardiovascular: Negative.  Negative for chest pain and palpitations.   Gastrointestinal: Positive for blood in stool and diarrhea. Negative for abdominal pain, constipation, nausea, rectal pain and vomiting.   Endocrine: Negative.    Genitourinary: Negative.    Musculoskeletal: Negative.    Skin: Negative.    Allergic/Immunologic: Negative.    Neurological: Negative.    Hematological: Negative.    Psychiatric/Behavioral: Negative.         Personal History     Past Medical History:   Diagnosis Date   • Arthritis    • Asthma    • H/O colonoscopy    • Hemorrhoids        Past Surgical History:   Procedure Laterality Date   • COLONOSCOPY N/A 2017    Procedure: COLONOSCOPY into cecum;  Surgeon: Farida Enriquez MD;  Location: [facility]  ENDOSCOPY;  Service:    • SKIN GRAFT      LOWER MOUTH    • WISDOM TOOTH EXTRACTION         Family History: family history includes Cancer in her cousin and mother; Diabetes in her father; Hypertension in her father; Kidney disease in her father; Lung cancer in her mother; Prostate cancer in her father. Otherwise pertinent FHx was reviewed and not pertinent to current issue.    Social History:  reports that she has never smoked. She has never used smokeless tobacco. She reports current alcohol use. She reports that she does not use drugs.    Home Medications:  Biotin, calcium citrate-vitamin d, cetirizine, clobetasol, meloxicam, multivitamin with minerals, and vitamin D3    Allergies:  Allergies   Allergen Reactions   • Bactrim [Sulfamethoxazole-Trimethoprim] Unknown - Low Severity   • Carbocaine [Mepivacaine Hcl] Unknown - Low Severity   • Novocain [Procaine] Unknown - Low Severity   • Penicillins Unknown - Low Severity   • Latex Rash       Objective    Objective     Vitals:   Temp:  [97.6 °F (36.4 °C)-97.8 °F (36.6 °C)] 97.8 °F (36.6 °C)  Heart Rate:  [] 70  Resp:  [18] 18  BP: (126-151)/(69-92) 135/70    Physical Exam  Vitals and nursing note reviewed.   Constitutional:       General: She is not in acute distress.     Appearance: Normal appearance. She is normal weight.   Cardiovascular:      Rate and Rhythm: Normal rate and regular rhythm.      Pulses: Normal pulses.      Heart sounds: Normal heart sounds.   Pulmonary:      Effort: Pulmonary effort is normal.      Breath sounds: Normal breath sounds.   Abdominal:      General: Abdomen is flat. Bowel sounds are normal.      Palpations: Abdomen is soft.      Tenderness: There is no abdominal tenderness. There is no guarding.   Musculoskeletal:         General: Normal range of motion.   Skin:     General: Skin is warm.      Capillary Refill: Capillary refill takes less than 2 seconds.   Neurological:      General: No focal deficit present.      Mental Status:  She is alert and oriented to person, place, and time.   Psychiatric:         Mood and Affect: Mood normal.         Behavior: Behavior normal.         Thought Content: Thought content normal.         Judgment: Judgment normal.         Result Review    Result Review:  I have personally reviewed the results from the time of this admission to 12/31/2022 02:00 EST and agree with these findings:  [x]  Laboratory list / accordion  []  Microbiology  []  Radiology  []  EKG/Telemetry   []  Cardiology/Vascular   []  Pathology  [x]  Old records  []  Other:        Assessment & Plan   Assessment / Plan     Brief Patient Summary:  Juli Manzanares is a 67 y.o. female who was admitted to the observation for further evaluation and treatment of her lower GI bleed.  Active Hospital Problems:  Active Hospital Problems    Diagnosis    • **Rectal bleeding      Plan:   Lower GI bleeding  -N.p.o. after midnight  -GI consult  -Hemoglobin every 6 hours  -Cardiac monitoring  -vital signs every 4 hours          DVT prophylaxis:  Mechanical DVT prophylaxis orders are present.    CODE STATUS:    Code Status (Patient has no pulse and is not breathing): CPR (Attempt to Resuscitate)  Medical Interventions (Patient has pulse or is breathing): Full Support    Admission Status:  I believe this patient meets observation status.    85 minutes has been spent by [facility] providers in the care of this patient while under observation status.      JOSE Dos Santos

## 2022-12-31 NOTE — OUTREACH NOTE
Prep Survey    Flowsheet Row Responses   [facility] patient discharged from? [place]   Is LACE score < 7 ? Yes   Eligibility [facility]   Date of Admission 12/31/22   Date of Discharge 12/31/22   Discharge Disposition Home or Self Care   Discharge diagnosis Rectal bleeding   Does the patient have one of the following disease processes/diagnoses(primary or secondary)? Other   Does the patient have Home health ordered? No   Is there a DME ordered? No   Prep survey completed? Yes          UGO CHOW - Registered Nurse

## 2022-12-31 NOTE — ED PROVIDER NOTES
EMERGENCY DEPARTMENT ENCOUNTER    Room Number:  22/22  Date of encounter:  12/31/2022  PCP: Hector Keene MD  Patient Care Team:  Hector Keene MD as PCP - General (Internal Medicine)  Lurdes Nielson MD as Consulting Physician (Obstetrics and Gynecology)  Israel Heredia II, MD as Consulting Physician (Hematology and Oncology)   Independent Historians: Patient, family    HPI:  Chief Complaint: Rectal bleeding  A complete HPI/ROS/PMH/PSH/SH/FH are unobtainable due to: Nothing    Chronic or social conditions impacting patient care (social determinants of health): None    Context: Juli Manzanares is a 67 y.o. female who presents to the ED c/o having rectal bleeding all day today.  She reports that she has been having bright red rectal bleeding since this morning.  She states she has never had similar episodes in the past.  She states she has had colonoscopies in the past that showed internal hemorrhoids.  She is not on blood thinners.  She has not been getting dizzy when she stands.  She has no chest pain or shortness of breath.  She denies abdominal pain.  Her symptoms have been constant.  She reports she has been through multiple pads today.  She denies any nausea or vomiting.  She reports that she was persistently having heavy bleeding in triage area before coming back.    Review of prior external notes (non-ED): GI H&P dated 7/12/2017 with family history of colon polyps.  With plan for colonoscopy.  Colonoscopy record shows internal hemorrhoids.    Review of prior external test results outside of this encounter: Chemistries dated 7/11/2022 were normal.  Blood counts dated 7/11/2022 were normal with a hemoglobin of 13.1.    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    PAST SURGICAL HISTORY  Past Surgical History:   Procedure Laterality Date   • COLONOSCOPY N/A 7/12/2017    Procedure: COLONOSCOPY into cecum;  Surgeon: Farida Enriquez MD;  Location: [facility] ENDOSCOPY;  Service:    • SKIN GRAFT      LOWER MOUTH    • WISDOM TOOTH EXTRACTION           FAMILY HISTORY  Family History   Problem Relation Age of Onset   • Cancer Mother    • Lung cancer Mother    • Hypertension Father    • Diabetes Father    • Kidney disease Father    • Prostate cancer Father    • Cancer Cousin          SOCIAL HISTORY  Social History     Socioeconomic History   • Marital status:      Spouse name: Naga   • Years of education: College   Tobacco Use   • Smoking status: Never   • Smokeless tobacco: Never   Vaping Use   • Vaping Use: Never used   Substance and Sexual Activity   • Alcohol use: Yes     Comment: rarely   • Drug use: Never         ALLERGIES  Bactrim [sulfamethoxazole-trimethoprim], Carbocaine [mepivacaine hcl], Novocain [procaine], Penicillins, and Latex        REVIEW OF SYSTEMS  Review of Systems   No chest pain, no shortness of breath, no nausea, no vomiting, no fever, no chills, no headache  All systems reviewed and negative except for those discussed in HPI.       PHYSICAL EXAM    I have reviewed the triage vital signs and nursing notes.    ED Triage Vitals   Temp Heart Rate Resp BP SpO2   12/30/22 1903 12/30/22 1903 12/30/22 1903 12/30/22 2057 12/30/22 1903   97.6 °F (36.4 °C) 105 18 134/92 99 %      Temp src Heart Rate Source Patient Position BP Location FiO2 (%)   -- 12/31/22 0136 -- -- --    Monitor           Physical Exam  GENERAL: Awake, alert, no acute distress, not ill-appearing  SKIN: Warm, dry  HENT: Normocephalic, atraumatic  EYES: no scleral icterus  CV: regular rhythm, regular rate  RESPIRATORY: normal effort, lungs clear  ABDOMEN: soft, nontender, nondistended  MUSCULOSKELETAL: no deformity  NEURO: alert, moves all extremities, follows commands          LAB RESULTS  Recent Results (from the past 24 hour(s))   Comprehensive Metabolic Panel    Collection Time: 12/30/22  9:02 PM    Specimen: Blood   Result Value Ref Range    Glucose 128 (H) 65 - 99 mg/dL    BUN 17 8 - 23 mg/dL    Creatinine 0.85 0.57 - 1.00 mg/dL    Sodium 138 136 - 145 mmol/L    Potassium 4.0 3.5 - 5.2 mmol/L    Chloride 102 98 - 107 mmol/L    CO2 24.7 22.0 - 29.0 mmol/L    Calcium 9.2 8.6 - 10.5 mg/dL    Total Protein 7.1 6.0 - 8.5 g/dL    Albumin 4.7 3.5 - 5.2 g/dL    ALT (SGPT) 23 1 - 33 U/L    AST (SGOT) 30 1 - 32 U/L    Alkaline Phosphatase 80 39 - 117 U/L    Total Bilirubin 0.7 0.0 - 1.2 mg/dL    Globulin 2.4 gm/dL    A/G Ratio 2.0 g/dL    BUN/Creatinine Ratio 20.0 7.0 - 25.0    Anion Gap 11.3 5.0 - 15.0 mmol/L    eGFR 74.3 >60.0 mL/min/1.73   Type & Screen    Collection Time: 12/30/22  9:02 PM    Specimen: Blood   Result Value Ref Range    ABO Type A     RH type Negative     Antibody Screen Negative     T&S Expiration Date 1/2/2023 11:59:59 PM    Lactic Acid, Plasma    Collection Time: 12/30/22  9:02 PM    Specimen: Blood   Result Value Ref Range    Lactate 1.3 0.5 - 2.0 mmol/L   Green Top (Gel)    Collection Time: 12/30/22  9:02 PM   Result Value Ref Range    Extra Tube Hold for add-ons.    Lavender Top    Collection Time: 12/30/22  9:02 PM   Result Value Ref Range    Extra Tube hold for add-on    Gold Top - SST    Collection Time: 12/30/22  9:02 PM   Result Value Ref Range    Extra Tube Hold for add-ons.    Light Blue Top    Collection Time: 12/30/22  9:02 PM   Result Value Ref Range    Extra Tube Hold for add-ons.    CBC Auto  Differential    Collection Time: 12/30/22  9:02 PM    Specimen: Blood   Result Value Ref Range    WBC 10.51 3.40 - 10.80 10*3/mm3    RBC 5.13 3.77 - 5.28 10*6/mm3    Hemoglobin 13.8 12.0 - 15.9 g/dL    Hematocrit 44.6 34.0 - 46.6 %    MCV 86.9 79.0 - 97.0 fL    MCH 26.9 26.6 - 33.0 pg    MCHC 30.9 (L) 31.5 - 35.7 g/dL    RDW 12.9 12.3 - 15.4 %    RDW-SD 40.8 37.0 - 54.0 fl    MPV 9.1 6.0 - 12.0 fL    Platelets 220 140 - 450 10*3/mm3    Neutrophil % 77.4 (H) 42.7 - 76.0 %    Lymphocyte % 13.7 (L) 19.6 - 45.3 %    Monocyte % 7.8 5.0 - 12.0 %    Eosinophil % 0.6 0.3 - 6.2 %    Basophil % 0.2 0.0 - 1.5 %    Immature Grans % 0.3 0.0 - 0.5 %    Neutrophils, Absolute 8.14 (H) 1.70 - 7.00 10*3/mm3    Lymphocytes, Absolute 1.44 0.70 - 3.10 10*3/mm3    Monocytes, Absolute 0.82 0.10 - 0.90 10*3/mm3    Eosinophils, Absolute 0.06 0.00 - 0.40 10*3/mm3    Basophils, Absolute 0.02 0.00 - 0.20 10*3/mm3    Immature Grans, Absolute 0.03 0.00 - 0.05 10*3/mm3    nRBC 0.0 0.0 - 0.2 /100 WBC       Ordered the above labs and independently reviewed the results.        RADIOLOGY  No Radiology Exams Resulted Within Past 24 Hours    I ordered the above noted radiological studies. Reviewed by me and discussed with radiologist.  See dictation for official radiology interpretation.      PROCEDURES    Procedures      MEDICATIONS GIVEN IN ER    Medications   sodium chloride 0.9 % flush 10 mL (has no administration in time range)         PROGRESS, DATA ANALYSIS, CONSULTS, AND MEDICAL DECISION MAKING    All labs have been independently reviewed by me.  All radiology studies have been reviewed by me and discussed with radiologist dictating the report.   EKG's independently viewed and interpreted by me.  Discussion below represents my analysis of pertinent findings related to patient's condition, differential diagnosis, treatment plan and final disposition.    Differential diagnosis includes but is not limited to upper GI bleed, lower GI bleed,  internal hemorrhoids, bleeding hemorrhoids.    ED Course as of 12/31/22 0157   Sat Dec 31, 2022   0153 Cussing with Leann with the observation unit.  She agrees to admit.  The patient's hemoglobin is 13.  Her rectal exam shows a mild amount of bright red blood.  No clots.  Her chemistries are normal.  Her lactic acid is normal.  She has no tachycardia nor hypotension.  I reviewed the work-up and findings with the patient and family at the bedside.  Plan admission for hemoglobin trending, GI consult in the morning.  They are agreeable.  I considered discharging her home but since she has had bleeding all day and has not stopped spontaneously, plan to admit her for management. [TR]      ED Course User Index  [TR] Bernard Sandra MD           PPE: The patient wore a mask and I wore an N95 mask throughout the entire patient encounter.       AS OF 01:57 EST VITALS:    BP - 151/85  HR - 96  TEMP - 97.6 °F (36.4 °C)  O2 SATS - 100%        DIAGNOSIS  Final diagnoses:   Acute lower GI bleeding         DISPOSITION  ED Disposition     ED Disposition   Decision to Admit    Condition   --    Comment   --                Note Disclaimer: At [facility], we believe that sharing information builds trust and better relationships. You are receiving this note because you recently visited [facility]. It is possible you will see health information before a provider has talked with you about it. This kind of information can be easy to misunderstand. To help you fully understand what it means for your health, we urge you to discuss this note with your provider.       Bernard Sandra MD  12/31/22 0157

## 2022-12-31 NOTE — DISCHARGE INSTRUCTIONS
Continue plenty of fluids at home.  Return to ER with any recurrent bleeding.  Advance diet as tolerated.  Return to the emergency department with worsening symptoms, uncontrolled pain, inability to tolerate oral liquids, fever greater than 101°F not controlled by Tylenol or as needed with emergent concerns.

## 2022-12-31 NOTE — PLAN OF CARE
Problem: Adult Inpatient Plan of Care  Goal: Plan of Care Review  Outcome: Met   Goal Outcome Evaluation:          Patient is alert and oriented. Patient being discharged home. Discharge instructions given to patient. Patient is agreeable with plan. Questions and concerns addressed. Patient is follow up with primary care doctor.

## 2022-12-31 NOTE — PROGRESS NOTES
ED OBSERVATION PROGRESS/DISCHARGE SUMMARY    Date of Admission: 12/31/2022   LOS: 0 days   PCP: Hector Keene MD    Subjective  Patient feeling improved this afternoon.  She tolerated lunch and has not had any further episodes of diarrhea nor blood per rectum.  She is eager to go home.    Hospital Outcome: 67-year-old female admitted to the observation unit for further evaluation of bloody diarrhea.  Patient started having diarrhea Thursday night and then developed bloody diarrhea Friday morning.  Patient's hemoglobin has remained stable 13.8 --> 14.3 --> 12.1    Patient was seen by GI this morning.  Her symptoms have mostly resolved and they suspect an anorectal source such as her hemorrhoids.  They recommend advancing diet and observing.  If patient remains symptom-free throughout the day today she can be discharged with outpatient follow-up.    Usual return to ER precautions discussed with patient who expresses understanding and is in agreement with plan.    ROS:  General: no fevers, chills  Respiratory: no cough, dyspnea  Cardiovascular: no chest pain, palpitations  Abdomen: No abdominal pain, nausea, vomiting, or diarrhea  Neurologic: No focal weakness    Objective   Physical Exam:  I have reviewed the vital signs.  Temp:  [97.6 °F (36.4 °C)-98.7 °F (37.1 °C)] 98.7 °F (37.1 °C)  Heart Rate:  [] 69  Resp:  [18] 18  BP: (120-151)/(66-92) 120/66  General Appearance:    Alert, cooperative, no distress  Head:    Normocephalic, atraumatic  Eyes:    Sclerae anicteric  Neck:   Supple, no mass  Lungs: Clear to auscultation bilaterally, respirations unlabored  Heart: Regular rate and rhythm, S1 and S2 normal, no murmur, rub or gallop  Abdomen:  Soft, non-tender, bowel sounds active, nondistended  Extremities: No clubbing, cyanosis, or edema to lower extremities  Pulses:  2+ and symmetric in distal lower extremities  Skin: No rashes   Neurologic: Oriented x3, Normal strength to extremities    Results Review:     I have reviewed the labs, radiology results and diagnostic studies.    Results from last 7 days   Lab Units 12/31/22  0313   WBC 10*3/mm3 10.23   HEMOGLOBIN g/dL 14.3   HEMATOCRIT % 45.3   PLATELETS 10*3/mm3 169     Results from last 7 days   Lab Units 12/31/22  0313 12/30/22  2102   SODIUM mmol/L 136 138   POTASSIUM mmol/L 3.8 4.0   CHLORIDE mmol/L 103 102   CO2 mmol/L 21.6* 24.7   BUN mg/dL 15 17   CREATININE mg/dL 0.81 0.85   CALCIUM mg/dL 9.3 9.2   BILIRUBIN mg/dL  --  0.7   ALK PHOS U/L  --  80   ALT (SGPT) U/L  --  23   AST (SGOT) U/L  --  30   GLUCOSE mg/dL 115* 128*     Imaging Results (Last 24 Hours)     ** No results found for the last 24 hours. **          I have reviewed the medications.  ---------------------------------------------------------------------------------------------  Assessment & Plan   Assessment/Problem List    Rectal bleeding      Plan:    Lower GI bleeding  Diarrhea  -GI consulted, no need for scope at this time  -Symptoms have resolved and patient tolerated solid foods at lunch without recurrence of diarrhea or bleeding  -Hemoglobin stable  -Discharged home with usual return to ER precautions       Disposition: Home    Follow-up after Discharge: PCP, GI as needed    85 minutes has been spent by [facility] providers in the care of this patient while under observation status     This note will serve as discharge summary    YOSELYN Martinez 12/31/22 09:10 EST

## 2023-01-02 ENCOUNTER — TRANSITIONAL CARE MANAGEMENT TELEPHONE ENCOUNTER (OUTPATIENT)
Dept: CALL CENTER | Facility: HOSPITAL | Age: 68
End: 2023-01-02
Payer: MEDICARE

## 2023-01-02 NOTE — OUTREACH NOTE
Call Center TCM Note    Flowsheet Row Responses   [facility], [place], operated by [facility] patient discharged from? [place]   Does the patient have one of the following disease processes/diagnoses(primary or secondary)? Other   TCM attempt successful? Yes   Call start time 0943   Call end time 0949   Discharge diagnosis Rectal bleeding   Person spoke with today (if not patient) and relationship patient   Meds reviewed with patient/caregiver? Yes   Is the patient having any side effects they believe may be caused by any medication additions or changes? No   Does the patient have all medications ordered at discharge? N/A   Is the patient taking all medications as directed (includes completed medication regime)? Yes   Comments Advised to make a fu appt with GI as needed   Does the patient have an appointment with their PCP within 7 days of discharge? No appointments available   Nursing Interventions Routed TCM call to PCP office   Psychosocial issues? No   Did the patient receive a copy of their discharge instructions? Yes   Nursing interventions Reviewed instructions with patient   What is the patient's perception of their health status since discharge? Improving   Is the patient/caregiver able to teach back signs and symptoms related to disease process for when to call PCP? Yes   Is the patient/caregiver able to teach back signs and symptoms related to disease process for when to call 911? Yes   Is the patient/caregiver able to teach back the hierarchy of who to call/visit for symptoms/problems? PCP, Specialist, Home health nurse, Urgent Care, ED, 911 Yes   If the patient is a current smoker, are they able to teach back resources for cessation? Not a smoker   TCM call completed? Yes   Wrap up additional comments Pt reports doing better, and has not had a BM since she has been home. Pt shares she has not been eating much since she went to hospital. Pt advised to make a GI fu appt as needed if she noticed blood with BM,  pt verbalized  understanding. RN will send a message to PCP office to help pt make a hospital fu appt as there are no appts available that satisfy TCM guidelines.   Call end time 0949   Would this patient benefit from a Referral to [facility] Social Work? No   Is the patient interested in additional calls from an ambulatory ?  NOTE:  applies to high risk patients requiring additional follow-up. No          Jolie Mcadams RN    1/2/2023, 09:52 EST

## 2023-01-06 ENCOUNTER — OFFICE VISIT (OUTPATIENT)
Dept: FAMILY MEDICINE CLINIC | Facility: CLINIC | Age: 68
End: 2023-01-06
Payer: MEDICARE

## 2023-01-06 VITALS
BODY MASS INDEX: 28.64 KG/M2 | DIASTOLIC BLOOD PRESSURE: 64 MMHG | WEIGHT: 189 LBS | SYSTOLIC BLOOD PRESSURE: 122 MMHG | TEMPERATURE: 97.7 F | OXYGEN SATURATION: 99 % | HEART RATE: 70 BPM | HEIGHT: 68 IN

## 2023-01-06 DIAGNOSIS — M54.50 CHRONIC BILATERAL LOW BACK PAIN WITHOUT SCIATICA: Primary | ICD-10-CM

## 2023-01-06 DIAGNOSIS — G89.29 CHRONIC BILATERAL LOW BACK PAIN WITHOUT SCIATICA: Primary | ICD-10-CM

## 2023-01-06 PROCEDURE — 99213 OFFICE O/P EST LOW 20 MIN: CPT | Performed by: INTERNAL MEDICINE

## 2023-01-06 NOTE — PROGRESS NOTES
Subjective   Juli Manzanares is a 67 y.o. female. Patient is here today for   Chief Complaint   Patient presents with   • Hospital Follow Up Visit          Vitals:    01/06/23 1517   BP: 122/64   Pulse: 70   Temp: 97.7 °F (36.5 °C)   SpO2: 99%     Body mass index is 29.16 kg/m².      Past Medical History:   Diagnosis Date   • Arthritis    • Asthma    • H/O colonoscopy    • Hemorrhoids       Allergies   Allergen Reactions   • Bactrim [Sulfamethoxazole-Trimethoprim] Unknown - Low Severity   • Carbocaine [Mepivacaine Hcl] Unknown - Low Severity   • Novocain [Procaine] Unknown - Low Severity   • Penicillins Unknown - Low Severity   • Latex Rash      Social History     Socioeconomic History   • Marital status:      Spouse name: Naga   • Years of education: College   Tobacco Use   • Smoking status: Never   • Smokeless tobacco: Never   Vaping Use   • Vaping Use: Never used   Substance and Sexual Activity   • Alcohol use: Yes     Comment: rarely   • Drug use: Never        Current Outpatient Medications:   •  BIOTIN PO, Take 4,000 mcg by mouth., Disp: , Rfl:   •  clobetasol (TEMOVATE) 0.05 % cream, APPLY TO AFFECTED AREA TWICE A DAY ECZEMA UP TO 2 WEEKS, Disp: , Rfl:   •  Multiple Vitamins-Minerals (MULTI FOR HER 50+ PO), Take  by mouth., Disp: , Rfl:   •  calcium citrate-vitamin d (CITRACAL) 200-250 MG-UNIT tablet tablet, Take  by mouth Daily., Disp: , Rfl:   •  cetirizine (zyrTEC) 10 MG tablet, , Disp: , Rfl:   •  Cholecalciferol (VITAMIN D3) 5000 UNITS capsule capsule, Take 5,000 Units by mouth Daily., Disp: , Rfl:   •  meloxicam (Mobic) 7.5 MG tablet, Take 1 tablet by mouth Daily., Disp: 30 tablet, Rfl: 1     Objective     History of Present Illness  This patient was at [facility], [place], operated by [facility] and had diarrhea with some associated bright red blood per rectum.  She was seen in consultation by Dr. Rizzo who did not feel that a colonoscopy or sigmoidoscopy was indicated at that time.    She was not anemic.  She had  developed diarrhea for a day and a half or so and before she developed bright red blood per rectum.    She had seen gastroenterology about 5 years ago with normal colonoscopy.  It was recommended that she get a repeat colonoscopy in about 10 years.        In the meantime, she got an MRI of her lumbar spine but I had arranged which suggested diffuse degenerative arthritic changes.    She continues to complain of lumbar spine pain.       Review of Systems   Constitutional: Negative.    HENT: Negative.    Respiratory: Negative.    Cardiovascular: Negative.    Gastrointestinal: Negative for abdominal distention, abdominal pain, anal bleeding and blood in stool.   Musculoskeletal:        She has lumbar spine pain.   Psychiatric/Behavioral: Negative.        Physical Exam  Vitals and nursing note reviewed.   Constitutional:       Appearance: Normal appearance.      Comments: Pleasant, neatly groomed, no distress.   Cardiovascular:      Rate and Rhythm: Regular rhythm.      Heart sounds: Normal heart sounds. No murmur heard.    No gallop.   Neurological:      Mental Status: She is alert and oriented to person, place, and time.   Psychiatric:         Mood and Affect: Mood normal.         Behavior: Behavior normal.         Thought Content: Thought content normal.           Problems Addressed this Visit        Musculoskeletal and Injuries    Chronic low back pain - Primary    Relevant Orders    Ambulatory Referral to Neurosurgery   Diagnoses       Codes Comments    Chronic bilateral low back pain without sciatica    -  Primary ICD-10-CM: M54.50, G89.29  ICD-9-CM: 724.2, 338.29             PLAN  The thinking from her work-up at the emergency room at [facility] was that she had some bleeding internal hemorrhoids.  An indication no longer has bleeding.  She will let me know if she does not which case I will refer her back to gastroenterology.    Her diarrhea has resolved.    She has chronic lumbar spine pain and a recent MRI of her  lumbar spine shows degenerative changes.  I have referred her to neurosurgery.    She has an appointment to follow-up with me in about 2 weeks and I asked her to keep that appointment.  No follow-ups on file.

## 2023-01-17 DIAGNOSIS — E78.00 HYPERCHOLESTEROLEMIA: Primary | ICD-10-CM

## 2023-01-20 LAB
ALBUMIN SERPL-MCNC: 4.5 G/DL (ref 3.5–5.2)
ALBUMIN/GLOB SERPL: 2.5 G/DL
ALP SERPL-CCNC: 71 U/L (ref 39–117)
ALT SERPL-CCNC: 17 U/L (ref 1–33)
APPEARANCE UR: ABNORMAL
AST SERPL-CCNC: 26 U/L (ref 1–32)
BACTERIA #/AREA URNS HPF: NORMAL /HPF
BASOPHILS # BLD AUTO: 0.03 10*3/MM3 (ref 0–0.2)
BASOPHILS NFR BLD AUTO: 0.7 % (ref 0–1.5)
BILIRUB SERPL-MCNC: 0.9 MG/DL (ref 0–1.2)
BILIRUB UR QL STRIP: NEGATIVE
BUN SERPL-MCNC: 19 MG/DL (ref 8–23)
BUN/CREAT SERPL: 21.6 (ref 7–25)
CALCIUM SERPL-MCNC: 9.4 MG/DL (ref 8.6–10.5)
CASTS URNS MICRO: NORMAL
CHLORIDE SERPL-SCNC: 97 MMOL/L (ref 98–107)
CHOLEST SERPL-MCNC: 220 MG/DL (ref 0–200)
CHOLEST/HDLC SERPL: 3.24 {RATIO}
CO2 SERPL-SCNC: 27.3 MMOL/L (ref 22–29)
COLOR UR: YELLOW
CREAT SERPL-MCNC: 0.88 MG/DL (ref 0.57–1)
EGFRCR SERPLBLD CKD-EPI 2021: 71.2 ML/MIN/1.73
EOSINOPHIL # BLD AUTO: 0.17 10*3/MM3 (ref 0–0.4)
EOSINOPHIL NFR BLD AUTO: 3.9 % (ref 0.3–6.2)
EPI CELLS #/AREA URNS HPF: NORMAL /HPF
ERYTHROCYTE [DISTWIDTH] IN BLOOD BY AUTOMATED COUNT: 12.7 % (ref 12.3–15.4)
GLOBULIN SER CALC-MCNC: 1.8 GM/DL
GLUCOSE SERPL-MCNC: 92 MG/DL (ref 65–99)
GLUCOSE UR QL STRIP: NEGATIVE
HCT VFR BLD AUTO: 41.4 % (ref 34–46.6)
HDLC SERPL-MCNC: 68 MG/DL (ref 40–60)
HGB BLD-MCNC: 13.1 G/DL (ref 12–15.9)
HGB UR QL STRIP: NEGATIVE
IMM GRANULOCYTES # BLD AUTO: 0.01 10*3/MM3 (ref 0–0.05)
IMM GRANULOCYTES NFR BLD AUTO: 0.2 % (ref 0–0.5)
KETONES UR QL STRIP: NEGATIVE
LDLC SERPL CALC-MCNC: 140 MG/DL (ref 0–100)
LEUKOCYTE ESTERASE UR QL STRIP: ABNORMAL
LYMPHOCYTES # BLD AUTO: 1.36 10*3/MM3 (ref 0.7–3.1)
LYMPHOCYTES NFR BLD AUTO: 31.1 % (ref 19.6–45.3)
MCH RBC QN AUTO: 27.2 PG (ref 26.6–33)
MCHC RBC AUTO-ENTMCNC: 31.6 G/DL (ref 31.5–35.7)
MCV RBC AUTO: 85.9 FL (ref 79–97)
MONOCYTES # BLD AUTO: 0.67 10*3/MM3 (ref 0.1–0.9)
MONOCYTES NFR BLD AUTO: 15.3 % (ref 5–12)
NEUTROPHILS # BLD AUTO: 2.13 10*3/MM3 (ref 1.7–7)
NEUTROPHILS NFR BLD AUTO: 48.8 % (ref 42.7–76)
NITRITE UR QL STRIP: NEGATIVE
NRBC BLD AUTO-RTO: 0 /100 WBC (ref 0–0.2)
PH UR STRIP: 7.5 [PH] (ref 5–8)
PLATELET # BLD AUTO: 205 10*3/MM3 (ref 140–450)
POTASSIUM SERPL-SCNC: 4.4 MMOL/L (ref 3.5–5.2)
PROT SERPL-MCNC: 6.3 G/DL (ref 6–8.5)
PROT UR QL STRIP: NEGATIVE
RBC # BLD AUTO: 4.82 10*6/MM3 (ref 3.77–5.28)
RBC #/AREA URNS HPF: NORMAL /HPF
SODIUM SERPL-SCNC: 135 MMOL/L (ref 136–145)
SP GR UR STRIP: ABNORMAL (ref 1–1.03)
TRIGL SERPL-MCNC: 70 MG/DL (ref 0–150)
UROBILINOGEN UR STRIP-MCNC: ABNORMAL MG/DL
VLDLC SERPL CALC-MCNC: 12 MG/DL (ref 5–40)
WBC # BLD AUTO: 4.37 10*3/MM3 (ref 3.4–10.8)
WBC #/AREA URNS HPF: NORMAL /HPF

## 2023-01-25 ENCOUNTER — OFFICE VISIT (OUTPATIENT)
Dept: FAMILY MEDICINE CLINIC | Facility: CLINIC | Age: 68
End: 2023-01-25
Payer: MEDICARE

## 2023-01-25 VITALS
DIASTOLIC BLOOD PRESSURE: 62 MMHG | SYSTOLIC BLOOD PRESSURE: 110 MMHG | WEIGHT: 179 LBS | HEIGHT: 68 IN | TEMPERATURE: 97.7 F | BODY MASS INDEX: 27.13 KG/M2 | RESPIRATION RATE: 18 BRPM | HEART RATE: 75 BPM | OXYGEN SATURATION: 100 %

## 2023-01-25 DIAGNOSIS — E78.00 HYPERCHOLESTEROLEMIA: Primary | ICD-10-CM

## 2023-01-25 PROCEDURE — 99213 OFFICE O/P EST LOW 20 MIN: CPT | Performed by: INTERNAL MEDICINE

## 2023-01-25 NOTE — PROGRESS NOTES
Subjective   Juli Manzanares is a 67 y.o. female. Patient is here today for   Chief Complaint   Patient presents with   • Hyperlipidemia     6 mo f/u           Vitals:    01/25/23 1253   BP: 110/62   Pulse: 75   Resp: 18   Temp: 97.7 °F (36.5 °C)   SpO2: 100%     Body mass index is 27.61 kg/m².      Past Medical History:   Diagnosis Date   • Allergic 1990s    Penicillin, Bactrim, and Carbocaine   • Arthritis    • Asthma    • H/O colonoscopy    • Hemorrhoids    • Low back pain Nov. 2021    Pain & discomfort gradually worsened & it is difficult to stand up straight.   • Pneumonia Sept 2002   • Scoliosis 1990    Discomfort gradually worsened through the years.      Allergies   Allergen Reactions   • Bactrim [Sulfamethoxazole-Trimethoprim] Unknown - Low Severity   • Carbocaine [Mepivacaine Hcl] Unknown - Low Severity   • Novocain [Procaine] Unknown - Low Severity   • Penicillins Unknown - Low Severity   • Latex Rash      Social History     Socioeconomic History   • Marital status:      Spouse name: Naga   • Years of education: College   Tobacco Use   • Smoking status: Never   • Smokeless tobacco: Never   Vaping Use   • Vaping Use: Never used   Substance and Sexual Activity   • Alcohol use: Yes     Comment: 2 - 3 drinks yearly   • Drug use: Never   • Sexual activity: Not Currently     Birth control/protection: Post-menopausal        Current Outpatient Medications:   •  BIOTIN PO, Take 4,000 mcg by mouth., Disp: , Rfl:   •  calcium citrate-vitamin d (CITRACAL) 200-250 MG-UNIT tablet tablet, Take  by mouth Daily., Disp: , Rfl:   •  cetirizine (zyrTEC) 10 MG tablet, , Disp: , Rfl:   •  Cholecalciferol (VITAMIN D3) 5000 UNITS capsule capsule, Take 5,000 Units by mouth Daily., Disp: , Rfl:   •  clobetasol (TEMOVATE) 0.05 % cream, APPLY TO AFFECTED AREA TWICE A DAY ECZEMA UP TO 2 WEEKS, Disp: , Rfl:   •  meloxicam (Mobic) 7.5 MG tablet, Take 1 tablet by mouth Daily., Disp: 30 tablet, Rfl: 1  •  Multiple  Vitamins-Minerals (MULTI FOR HER 50+ PO), Take  by mouth., Disp: , Rfl:      Objective     History of Present Illness  She is here to follow-up on labs from last week.    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    She is started taking meloxicam 7.5 mg daily for pain she is not sure whether or not that is really much help for her yet she is only taken it for for 5 days so far.      Hyperlipidemia         Review of Systems   Constitutional: Negative.    HENT: Negative.    Respiratory: Negative.    Cardiovascular: Negative.    Psychiatric/Behavioral: Negative.        Physical Exam  Vitals and nursing note reviewed.   Constitutional:       Appearance: Normal appearance.      Comments: Pleasant, neatly groomed, no distress.  BMI 27, weight 279 pounds, height 5 feet 7 inches.   Cardiovascular:      Rate and Rhythm: Regular rhythm.      Heart sounds: Normal heart sounds. No murmur heard.    No gallop.   Neurological:      Mental Status: She is alert and oriented to person, place, and time.   Psychiatric:         Mood and Affect: Mood normal.         Behavior: Behavior normal.         Thought Content: Thought content normal.           Problems Addressed this Visit        Cardiac and Vasculature    Hypercholesterolemia - Primary   Diagnoses       Codes Comments    Hypercholesterolemia    -  Primary ICD-10-CM: E78.00  ICD-9-CM: 272.0             PLAN  Her LDL cholesterol is a bit high..  She and I reviewed the results.  Her LDL was 140.  Review of her chart shows it to be less than 130 (125) 3 years ago.  I would like to see it less than 130 generally.  A good thing about her lipid profile is that her HDL cholesterol is very high at 68.  She is modestly overweight with a BMI of 27.  Weight loss would help her cholesterol.    A lower fat diet might be beneficial.  I am pretty certain that her  and she keep a diet most appropriate with  management of his type 2 diabetes so she probably has a pretty healthy diet.    I like to see her back in 6 months.  If she does not have an LDL cholesterol less than 130 at that time, I would ask her to start taking rosuvastatin 10 mg once daily.        No follow-ups on file.

## 2023-02-24 RX ORDER — MELOXICAM 7.5 MG/1
TABLET ORAL
Qty: 30 TABLET | Refills: 1 | Status: SHIPPED | OUTPATIENT
Start: 2023-02-24

## 2023-03-09 ENCOUNTER — OFFICE VISIT (OUTPATIENT)
Dept: NEUROSURGERY | Facility: CLINIC | Age: 68
End: 2023-03-09
Payer: MEDICARE

## 2023-03-09 VITALS
WEIGHT: 176 LBS | SYSTOLIC BLOOD PRESSURE: 138 MMHG | HEART RATE: 76 BPM | BODY MASS INDEX: 27.14 KG/M2 | OXYGEN SATURATION: 97 % | DIASTOLIC BLOOD PRESSURE: 82 MMHG

## 2023-03-09 DIAGNOSIS — M41.25 OTHER IDIOPATHIC SCOLIOSIS, THORACOLUMBAR REGION: Primary | ICD-10-CM

## 2023-03-09 PROCEDURE — 1160F RVW MEDS BY RX/DR IN RCRD: CPT | Performed by: NEUROLOGICAL SURGERY

## 2023-03-09 PROCEDURE — 99204 OFFICE O/P NEW MOD 45 MIN: CPT | Performed by: NEUROLOGICAL SURGERY

## 2023-03-09 PROCEDURE — 1159F MED LIST DOCD IN RCRD: CPT | Performed by: NEUROLOGICAL SURGERY

## 2023-03-09 NOTE — PROGRESS NOTES
Patient ID: Juli Manzanares is a 67 y.o. female is being seen for consultation today at the request of Hector Keene MD chronic bilateral low back pain sciatica. MRI Lumbar Spine WO 12/12/22.    Today, Juli Manzanares reports low back pain without radiating numbness and tingling down bilateral leg. She denies loss of bowel/bladder incontinence.  She reports use of heat with relief. She reports physical therapy in Sept and Oct 2022 and reports still does PT home exercises with moderate relief.    Subjective     The patient is here in regards to   Chief Complaint   Patient presents with   • Back Pain       History of Present Illness  Juli is in fantastic shape for her age.  She complains of worsening back pain over the past few years.  She is worked extensively with physical therapy in the past to increase her flexibility and improve her core strength which has been very effective in dealing with her back pain.  She notes that she has pain mostly when she has been standing for a long period of time and starting in the afternoon on a daily basis.  She has been started on meloxicam which is also been helpful for her.  She denies any sort of leg pain or bowel bladder issues.      While in the room and during my examination of the patient I wore a mask and eye protection.  I washed my hands before and after this patient encounter.  The patient was also wearing a mask.    The following portions of the patient's history were reviewed and updated as appropriate: allergies, current medications, past family history, past medical history, past social history, past surgical history and problem list.    Review of Systems   Constitutional: Negative for chills and fever.   Respiratory: Negative for cough, chest tightness and shortness of breath.    Cardiovascular: Negative for chest pain, palpitations and leg swelling.   Gastrointestinal: Negative for abdominal pain, constipation, diarrhea, nausea and vomiting.   Genitourinary:  Negative for difficulty urinating, enuresis and urgency.   Musculoskeletal: Positive for back pain. Negative for gait problem.   Skin: Negative for rash.   Neurological: Negative for weakness and numbness (or tingling).   Hematological: Does not bruise/bleed easily.   Psychiatric/Behavioral: Negative for sleep disturbance.        Past Medical History:   Diagnosis Date   • Allergic 1990s    Penicillin, Bactrim, and Carbocaine   • Arthritis    • Asthma    • H/O colonoscopy    • Hemorrhoids    • Low back pain Nov. 2021    Pain & discomfort gradually worsened & it is difficult to stand up straight.   • Pneumonia Sept 2002   • Scoliosis 1990    Discomfort gradually worsened through the years.       Allergies   Allergen Reactions   • Bactrim [Sulfamethoxazole-Trimethoprim] Unknown - Low Severity   • Carbocaine [Mepivacaine Hcl] Unknown - Low Severity   • Novocain [Procaine] Unknown - Low Severity   • Penicillins Unknown - Low Severity   • Latex Rash       Family History   Problem Relation Age of Onset   • Cancer Mother         Lung cancer in 2005   • Lung cancer Mother    • Hypertension Father    • Diabetes Father    • Kidney disease Father    • Prostate cancer Father    • Cancer Father         Colon cancer in 2021   • Cancer Cousin        Social History     Socioeconomic History   • Marital status:      Spouse name: Naga   • Years of education: College   Tobacco Use   • Smoking status: Never   • Smokeless tobacco: Never   Vaping Use   • Vaping Use: Never used   Substance and Sexual Activity   • Alcohol use: Yes     Comment: 2 - 3 drinks yearly   • Drug use: Never   • Sexual activity: Not Currently     Birth control/protection: Post-menopausal       Past Surgical History:   Procedure Laterality Date   • COLONOSCOPY N/A 7/12/2017    Procedure: COLONOSCOPY into cecum;  Surgeon: Farida Enriquez MD;  Location: [facility] ENDOSCOPY;  Service:    • SKIN GRAFT      LOWER MOUTH    • WISDOM TOOTH EXTRACTION           Objective      Vitals:    03/09/23 0903   BP: 138/82   Pulse: 76   SpO2: 97%     Body mass index is 27.14 kg/m².    Physical Exam  Constitutional:       Appearance: Normal appearance.   HENT:      Head: Normocephalic and atraumatic.   Eyes:      Extraocular Movements: Extraocular movements intact.      Conjunctiva/sclera: Conjunctivae normal.      Pupils: Pupils are equal, round, and reactive to light.   Cardiovascular:      Rate and Rhythm: Normal rate and regular rhythm.      Pulses: Normal pulses.   Pulmonary:      Breath sounds: Normal breath sounds.   Abdominal:      Palpations: Abdomen is soft.   Musculoskeletal:         General: Normal range of motion.      Cervical back: Normal range of motion and neck supple.   Skin:     General: Skin is warm and dry.   Neurological:      Mental Status: She is alert and oriented to person, place, and time.      Cranial Nerves: Cranial nerves 2-12 are intact.      Motor: Motor function is intact. No weakness or atrophy.      Coordination: Coordination is intact. Romberg sign negative. Romberg Test normal.      Gait: Gait is intact. Gait normal.      Deep Tendon Reflexes: Reflexes are normal and symmetric.      Reflex Scores:       Tricep reflexes are 2+ on the right side and 2+ on the left side.       Bicep reflexes are 2+ on the right side and 2+ on the left side.       Brachioradialis reflexes are 2+ on the right side and 2+ on the left side.       Patellar reflexes are 2+ on the right side and 2+ on the left side.       Achilles reflexes are 2+ on the right side and 2+ on the left side.  Psychiatric:         Speech: Speech normal.         Neurologic Exam     Mental Status   Oriented to person, place, and time.   Attention: normal. Concentration: normal.   Speech: speech is normal   Level of consciousness: alert    Cranial Nerves   Cranial nerves II through XII intact.     CN III, IV, VI   Pupils are equal, round, and reactive to light.    Motor Exam   Muscle bulk: normal  Overall  muscle tone: normal    Strength   Strength 5/5 except as noted.     Sensory Exam   Light touch normal.     Gait, Coordination, and Reflexes     Gait  Gait: normal    Coordination   Romberg: negative    Reflexes   Reflexes 2+ except as noted.   Right brachioradialis: 2+  Left brachioradialis: 2+  Right biceps: 2+  Left biceps: 2+  Right triceps: 2+  Left triceps: 2+  Right patellar: 2+  Left patellar: 2+  Right achilles: 2+  Left achilles: 2+      Assessment & Plan   Independent Review of Radiographic Studies:      I personally reviewed the images from the following studies.    MR: MRI of the lumbar spine wo contrast was reviewed and shows Extensive levoscoliosis with the apex at L3.  Degenerative disc disease at multiple levels but she has congenitally long pedicles and there is not a lot of foraminal stenosis.  She does have what appears to be a synovial cyst at L5-S1 inferior to the disc base but does not appear to be abutting any traversing nerves.  There is evidence of a pars defect at L5 bilaterally.    Assessment/Plan: Juli overall has back pain likely due to some microinstability from her pars defect.  I do think that in her case with mild back pain and no radiculopathy, her best option would be to continue to manage this pain conservatively with Tylenol and meloxicam and continued her physical therapy and perhaps increase the intensity of her core workouts to help improve her back pain.  I do think that she would benefit from a CT scan of her lumbar spine as well as a scoliosis x-ray to better understand her alignment and her pars defects.    Medical Decision Making:      CT scan of the lumbar spine  X-ray scoliosis         Diagnoses and all orders for this visit:    1. Other idiopathic scoliosis, thoracolumbar region (Primary)  -     XR Spine Scoliosis 2 or 3 Views  -     CT Lumbar Spine Without Contrast             Patient Instructions/Recommendations:    Follow-up after x-ray and CT scan  results      Antione Day MD  03/09/23  09:33 EST

## 2023-03-10 ENCOUNTER — PATIENT ROUNDING (BHMG ONLY) (OUTPATIENT)
Dept: NEUROSURGERY | Facility: CLINIC | Age: 68
End: 2023-03-10
Payer: MEDICARE

## 2023-03-28 ENCOUNTER — HOSPITAL ENCOUNTER (OUTPATIENT)
Dept: GENERAL RADIOLOGY | Facility: HOSPITAL | Age: 68
Discharge: HOME | End: 2023-03-28
Admitting: NEUROLOGICAL SURGERY
Payer: MEDICARE

## 2023-03-28 PROCEDURE — 72082 X-RAY EXAM ENTIRE SPI 2/3 VW: CPT

## 2023-03-31 ENCOUNTER — HOSPITAL ENCOUNTER (OUTPATIENT)
Dept: CT IMAGING | Facility: HOSPITAL | Age: 68
Discharge: HOME | End: 2023-03-31
Admitting: NEUROLOGICAL SURGERY
Payer: MEDICARE

## 2023-03-31 PROCEDURE — 72131 CT LUMBAR SPINE W/O DYE: CPT

## 2023-04-12 NOTE — PROGRESS NOTES
Patient ID: Juli Manzanares is a 67 y.o. female is an established patient with follow up appointment after CT lumbar spine WO 3/31/23 and XR Spine Scoliosis 2 or 3 VWS 3/28/23.    Today, patient reports    Subjective:     History of Present Illness  Juli has been continuing her exercises.  She notes that her back pain has not gotten any worse than before.  She denies any radiculopathy or neurogenic claudication symptoms.      Review of Systems   Musculoskeletal: Positive for back pain. Negative for gait problem and neck pain.   Neurological: Negative for dizziness, tremors, seizures, syncope, facial asymmetry, speech difficulty, weakness, light-headedness, numbness and headaches.   Psychiatric/Behavioral: Negative for agitation, behavioral problems, confusion, decreased concentration, dysphoric mood, hallucinations, self-injury, sleep disturbance and suicidal ideas. The patient is not nervous/anxious and is not hyperactive.         While in the room and during my examination of the patient I wore a mask and eye protection.  I washed my hands before and after this patient encounter.  The patient was also wearing a mask.    The following portions of the patient's history were reviewed and updated as appropriate: allergies, current medications, past family history, past medical history, past social history, past surgical history and problem list.     Objective:    Vitals:    04/13/23 0915   BP: 118/74   Pulse: 74   SpO2: 97%     Body mass index is 28.04 kg/m².    Physical Exam  Constitutional:       Appearance: Normal appearance.   HENT:      Head: Normocephalic and atraumatic.   Eyes:      Extraocular Movements: Extraocular movements intact.      Conjunctiva/sclera: Conjunctivae normal.      Pupils: Pupils are equal, round, and reactive to light.   Cardiovascular:      Rate and Rhythm: Normal rate and regular rhythm.      Pulses: Normal pulses.   Pulmonary:      Breath sounds: Normal breath sounds.   Abdominal:       Palpations: Abdomen is soft.   Musculoskeletal:         General: Normal range of motion.      Cervical back: Normal range of motion and neck supple.   Skin:     General: Skin is warm and dry.   Neurological:      Mental Status: She is alert and oriented to person, place, and time.      Cranial Nerves: Cranial nerves 2-12 are intact.      Motor: Motor function is intact. No weakness or atrophy.      Coordination: Coordination is intact. Romberg sign negative. Romberg Test normal.      Gait: Gait is intact. Gait normal.      Deep Tendon Reflexes: Reflexes are normal and symmetric.      Reflex Scores:       Tricep reflexes are 2+ on the right side and 2+ on the left side.       Bicep reflexes are 2+ on the right side and 2+ on the left side.       Brachioradialis reflexes are 2+ on the right side and 2+ on the left side.       Patellar reflexes are 2+ on the right side and 2+ on the left side.       Achilles reflexes are 2+ on the right side and 2+ on the left side.  Psychiatric:         Speech: Speech normal.       Neurologic Exam     Mental Status   Oriented to person, place, and time.   Attention: normal. Concentration: normal.   Speech: speech is normal   Level of consciousness: alert    Cranial Nerves   Cranial nerves II through XII intact.     CN III, IV, VI   Pupils are equal, round, and reactive to light.    Motor Exam   Muscle bulk: normal  Overall muscle tone: normal    Strength   Strength 5/5 except as noted.     Sensory Exam   Light touch normal.     Gait, Coordination, and Reflexes     Gait  Gait: normal    Coordination   Romberg: negative    Reflexes   Reflexes 2+ except as noted.   Right brachioradialis: 2+  Left brachioradialis: 2+  Right biceps: 2+  Left biceps: 2+  Right triceps: 2+  Left triceps: 2+  Right patellar: 2+  Left patellar: 2+  Right achilles: 2+  Left achilles: 2+       Results: CT scan shows right worse than left complex L5 pars defects with significant sclerosis and nonunion.     X-rays scoliosis shows significant coronal curvature in the lumbar spine balance by opposite curvature in the thoracic spine resulting in neutral coronal balance and slightly positive sagittal balance which is normal for her age.    Assessment/Plan: Overall my recommendations to Juli would be to continue to treat her back pain conservatively with weight loss and exercise and if this is not effective we can do a bilateral mini open pars fixation.       Diagnoses and all orders for this visit:    1. Pars defect of lumbar spine (Primary)    2. Chronic bilateral low back pain without sciatica            Follow-up as needed  Antione Day MD  04/13/23  09:46 EDT

## 2023-04-13 ENCOUNTER — OFFICE VISIT (OUTPATIENT)
Dept: NEUROSURGERY | Facility: CLINIC | Age: 68
End: 2023-04-13
Payer: COMMERCIAL

## 2023-04-13 VITALS
SYSTOLIC BLOOD PRESSURE: 118 MMHG | WEIGHT: 179 LBS | HEIGHT: 67 IN | OXYGEN SATURATION: 97 % | HEART RATE: 74 BPM | BODY MASS INDEX: 28.09 KG/M2 | DIASTOLIC BLOOD PRESSURE: 74 MMHG

## 2023-04-13 DIAGNOSIS — M43.06 PARS DEFECT OF LUMBAR SPINE: Primary | ICD-10-CM

## 2023-04-13 DIAGNOSIS — G89.29 CHRONIC BILATERAL LOW BACK PAIN WITHOUT SCIATICA: ICD-10-CM

## 2023-04-13 DIAGNOSIS — M54.50 CHRONIC BILATERAL LOW BACK PAIN WITHOUT SCIATICA: ICD-10-CM

## 2023-04-13 PROCEDURE — 99213 OFFICE O/P EST LOW 20 MIN: CPT | Performed by: NEUROLOGICAL SURGERY

## 2023-05-22 ENCOUNTER — TELEPHONE (OUTPATIENT)
Dept: FAMILY MEDICINE CLINIC | Facility: CLINIC | Age: 68
End: 2023-05-22

## 2023-05-22 NOTE — TELEPHONE ENCOUNTER
Caller: Juli Manzanares    Relationship: Self    Best call back number: [phone]    What orders are you requesting (i.e. lab or imaging): COLONOSCOPY     In what timeframe would the patient need to come in: AS SOON AS POSSIBLE     Where will you receive your lab/imaging services: [phone]    Additional notes: PATIENT IS REQUESTING A CALL BACK ONCE ORDERS ARE PLACED.

## 2023-05-24 NOTE — TELEPHONE ENCOUNTER
Attempted to contact pt regarding colonoscopy question. Left a  VM stating that I will call back tomorrow.

## 2023-05-25 NOTE — TELEPHONE ENCOUNTER
Hub staff attempted to follow warm transfer process and was unsuccessful     Caller: Juli Manzanares    Relationship to patient: Self    Best call back number: [phone]    Patient is needing:  PATIENT RETURNING FELICIANO'S CALL.    PLEASE ADVISE.

## 2023-05-25 NOTE — TELEPHONE ENCOUNTER
Called and informed pt that Dr. Keene would need to see her before he could send her out for a colonoscopy. Pt verbally understood and scheduled an appointment to be seen.

## 2023-06-01 ENCOUNTER — OFFICE VISIT (OUTPATIENT)
Dept: FAMILY MEDICINE CLINIC | Facility: CLINIC | Age: 68
End: 2023-06-01

## 2023-06-01 VITALS
HEIGHT: 68 IN | BODY MASS INDEX: 26.37 KG/M2 | WEIGHT: 174 LBS | SYSTOLIC BLOOD PRESSURE: 122 MMHG | TEMPERATURE: 98.4 F | DIASTOLIC BLOOD PRESSURE: 62 MMHG | HEART RATE: 80 BPM | OXYGEN SATURATION: 99 %

## 2023-06-01 DIAGNOSIS — R19.4 CHANGE IN BOWEL HABITS: Primary | ICD-10-CM

## 2023-06-01 PROCEDURE — 99213 OFFICE O/P EST LOW 20 MIN: CPT | Performed by: INTERNAL MEDICINE

## 2023-06-01 NOTE — PROGRESS NOTES
Subjective  Answers for HPI/ROS submitted by the patient on 5/25/2023  Please describe your symptoms.: For the past 5 months, I have experienced an occasional small leakage of soft feces several hours after elimination.   After elimination of feces, a few hours later when I only urinate, I also wipe a small amount  (a 1/2 teaspoon size) of feces from my rectum.  This sometimes occurs 3 or 4 times later in the day.     Then, I have no similar symptoms for  a week or two.  Then the symptom occurs again.   , , I wonder if an internal hemorrhoid or something may be causing this slight feces leakage to  occur hours after elimination.  Have you had these symptoms before?: No  How long have you been having these symptoms?: Greater than 2 weeks  Please list any medications you are currently taking for this condition.: None  Please describe any probable cause for these symptoms. : On Dec. 29, 2022, I had diarrhea in the middle of the night along with vomiting.  The diarrhea ceased, and then rectal bleeding occurred on Dec. 30, 2022 (the following day).  The bleeding continued all day and into the early evening.  I went to the [facility] emergency room at 7:00 p.m.  , , I was kept overnight in the [facility] Observation Unit  and the bleeding stopped about 3:00 a.m. on Dec. 31, 2022.  , , The ER physicians determined that I most likely had a bleeding “internal hemorrhoid.”  I was fine after that incident.  , , A few  weeks later, the current symptoms began.   A very minimal amount of feces would linger later in the day after elimination.  The symptoms have continued sporadically since then to the present (June 1, 2023).  What is the primary reason for your visit?: Mariely Manzanares is a 67 y.o. female. Patient is here today for   Chief Complaint   Patient presents with   • GI Problem     Every 2 or 3 weeks has a painful bowel movement, a bit of leakage as well, going on since Jan.           Vitals:    06/01/23  1259   BP: 122/62   Pulse: 80   Temp: 98.4 °F (36.9 °C)   SpO2: 99%     Body mass index is 26.46 kg/m².      Past Medical History:   Diagnosis Date   • Allergic 1990s    Penicillin, Bactrim, and Carbocaine   • Arthritis    • Asthma    • H/O colonoscopy    • Hemorrhoids    • Low back pain Nov. 2021    Pain & discomfort gradually worsened & it is difficult to stand up straight.   • Pneumonia Sept 2002   • Scoliosis 1990    Discomfort gradually worsened through the years.      Allergies   Allergen Reactions   • Bactrim [Sulfamethoxazole-Trimethoprim] Unknown - Low Severity   • Carbocaine [Mepivacaine Hcl] Unknown - Low Severity   • Novocain [Procaine] Unknown - Low Severity   • Penicillins Unknown - Low Severity   • Latex Rash      Social History     Socioeconomic History   • Marital status:      Spouse name: Naga   • Years of education: College   Tobacco Use   • Smoking status: Never   • Smokeless tobacco: Never   Vaping Use   • Vaping Use: Never used   Substance and Sexual Activity   • Alcohol use: Not Currently     Comment: 2 - 3 drinks yearly   • Drug use: Never   • Sexual activity: Not Currently     Birth control/protection: Post-menopausal        Current Outpatient Medications:   •  BIOTIN PO, Take 4,000 mcg by mouth., Disp: , Rfl:   •  calcium citrate-vitamin d (CITRACAL) 200-250 MG-UNIT tablet tablet, Take  by mouth Daily., Disp: , Rfl:   •  cetirizine (zyrTEC) 10 MG tablet, , Disp: , Rfl:   •  Cholecalciferol (VITAMIN D3) 5000 UNITS capsule capsule, Take 1 capsule by mouth Daily., Disp: , Rfl:   •  clobetasol (TEMOVATE) 0.05 % cream, APPLY TO AFFECTED AREA TWICE A DAY ECZEMA UP TO 2 WEEKS, Disp: , Rfl:   •  meloxicam (MOBIC) 7.5 MG tablet, TAKE 1 TABLET BY MOUTH EVERY DAY, Disp: 30 tablet, Rfl: 1  •  Misc Natural Products (LUTEIN VISION BLEND PO), Take 22 mg by mouth Daily., Disp: , Rfl:   •  Multiple Vitamins-Minerals (MULTI FOR HER 50+ PO), Take  by mouth., Disp: , Rfl:      Objective     History of  Present Illness  This patient has had occasional painful bowel movements and some fecal incontinence this since this past January.  GI Problem         Review of Systems   Constitutional: Negative.    HENT: Negative.    Respiratory: Negative.    Cardiovascular: Negative.    Gastrointestinal:        She has occasional hard painful bowel movements and fecal incontinence.   Musculoskeletal: Negative.    Psychiatric/Behavioral: Negative.        Physical Exam  Vitals reviewed.   Constitutional:       General: She is not in acute distress.     Appearance: Normal appearance. She is not ill-appearing, toxic-appearing or diaphoretic.      Comments: Pleasant, neatly groomed, no distress.   Cardiovascular:      Rate and Rhythm: Regular rhythm.      Heart sounds: Normal heart sounds. No murmur heard.    No gallop.   Pulmonary:      Effort: No respiratory distress.      Breath sounds: Normal breath sounds. No rales.   Neurological:      Mental Status: She is alert and oriented to person, place, and time.   Psychiatric:         Mood and Affect: Mood normal.         Behavior: Behavior normal.         Thought Content: Thought content normal.           Problems Addressed this Visit    None  Visit Diagnoses     Change in bowel habits    -  Primary    Relevant Orders    Ambulatory Referral to Gastroenterology      Diagnoses       Codes Comments    Change in bowel habits    -  Primary ICD-10-CM: R19.4  ICD-9-CM: 787.99             PLAN  She has had a change in bowel habits.  Her brother-in-law had seen Dr. Wong in the past and has had great deal of confidence in him as I do.  She requested a referral to see him to get his opinion about her complaint and I put that referral in today.  No follow-ups on file.

## 2023-06-07 ENCOUNTER — TELEPHONE (OUTPATIENT)
Dept: GASTROENTEROLOGY | Facility: CLINIC | Age: 68
End: 2023-06-07
Payer: MEDICARE

## 2023-06-07 NOTE — TELEPHONE ENCOUNTER
Patient is a referral in workque, she would prefer to be seen at [place] with Dr. Wong.   Please call patient and schedule an appointment.   She isn't having any major problems, but is having change in bowel habits.   Wants to eventually have colonoscopy done.

## 2023-06-13 ENCOUNTER — OFFICE VISIT (OUTPATIENT)
Dept: GASTROENTEROLOGY | Facility: CLINIC | Age: 68
End: 2023-06-13
Payer: MEDICARE

## 2023-06-13 VITALS
BODY MASS INDEX: 26.7 KG/M2 | DIASTOLIC BLOOD PRESSURE: 98 MMHG | WEIGHT: 176.2 LBS | SYSTOLIC BLOOD PRESSURE: 158 MMHG | HEIGHT: 68 IN

## 2023-06-13 DIAGNOSIS — R15.1 FECAL SMEARING: Primary | ICD-10-CM

## 2023-06-13 DIAGNOSIS — Z80.0 FAMILY HISTORY OF COLON CANCER IN FATHER: ICD-10-CM

## 2023-06-13 DIAGNOSIS — K59.04 CHRONIC IDIOPATHIC CONSTIPATION: ICD-10-CM

## 2023-06-13 NOTE — PROGRESS NOTES
PATIENT INFORMATION  Juli Manzanares       - 1955    CHIEF COMPLAINT  Chief Complaint   Patient presents with    Bowel changes       HISTORY OF PRESENT ILLNESS  Here today for intermittent loose stools and leakage every 2 weeks. Her bowels move daily and 1-2 a day and that is excluding her days when she has leakage. No nocturnal BMs.    Is on a powder Probiotic with only 4 grams of fiber. She would skip days prior to that sith mild constipation    No recent bleeding and her last colon was  and was normal but Father had CRC in his 90s.      REVIEWED PERTINENT RESULTS/ LABS  No results found for: CASEREPORT, FINALDX  Lab Results   Component Value Date    HGB 13.1 2023    MCV 85.9 2023     2023    ALT 17 2023    AST 26 2023    TRIG 70 2023      No results found.    REVIEW OF SYSTEMS  Review of Systems   Constitutional:  Negative for activity change, chills, fever and unexpected weight change.   HENT:  Negative for congestion.    Eyes:  Negative for visual disturbance.   Respiratory:  Negative for shortness of breath.    Cardiovascular:  Negative for chest pain and palpitations.   Gastrointestinal:  Positive for anal bleeding, constipation, diarrhea (Fecal leakage) and rectal pain. Negative for abdominal pain and blood in stool.   Endocrine: Negative for cold intolerance and heat intolerance.   Genitourinary:  Negative for hematuria.   Musculoskeletal:  Negative for gait problem.   Skin:  Negative for color change.   Allergic/Immunologic: Negative for immunocompromised state.   Neurological:  Negative for weakness and light-headedness.   Hematological:  Negative for adenopathy.   Psychiatric/Behavioral:  Negative for sleep disturbance. The patient is not nervous/anxious.        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 2021    Pain & discomfort gradually worsened & it is difficult to stand up straight.    Pneumonia Sept 2002    Scoliosis 1990    Discomfort gradually worsened through the years.         SURGICAL HISTORY  Past Surgical History:   Procedure Laterality Date    COLONOSCOPY N/A 07/12/2017    Procedure: COLONOSCOPY into cecum;  Surgeon: Farida Enriquez MD;  Location: [facility] ENDOSCOPY;  Service:     EYE SURGERY  Feb. 2023    I had cataract surgery & implants in both eyes on 2/14/23 & 2/23/23    SKIN GRAFT      LOWER MOUTH     WISDOM TOOTH EXTRACTION           FAMILY HISTORY  Family History   Problem Relation Age of Onset    Cancer Mother         Lung cancer in 2005    Lung cancer Mother     Hypertension Father     Diabetes Father     Kidney disease Father         My father had prostate cancer in the late 1980s    Prostate cancer Father     Cancer Father         Colon cancer in 2021    Vision loss Father         Had glaucoma    Cancer Cousin          SOCIAL HISTORY  Social History     Occupational History     Employer: RETIRED   Tobacco Use    Smoking status: Never    Smokeless tobacco: Never   Vaping Use    Vaping Use: Never used   Substance and Sexual Activity    Alcohol use: Not Currently     Comment: 2 - 3 drinks yearly    Drug use: Never    Sexual activity: Not Currently     Birth control/protection: Post-menopausal         CURRENT MEDICATIONS    Current Outpatient Medications:     BIOTIN PO, Take 4,000 mcg by mouth.,  "Disp: , Rfl:     calcium citrate-vitamin d (CITRACAL) 200-250 MG-UNIT tablet tablet, Take  by mouth Daily., Disp: , Rfl:     cetirizine (zyrTEC) 10 MG tablet, , Disp: , Rfl:     Cholecalciferol (VITAMIN D3) 5000 UNITS capsule capsule, Take 1 capsule by mouth Daily., Disp: , Rfl:     clobetasol (TEMOVATE) 0.05 % cream, APPLY TO AFFECTED AREA TWICE A DAY ECZEMA UP TO 2 WEEKS, Disp: , Rfl:     meloxicam (MOBIC) 7.5 MG tablet, TAKE 1 TABLET BY MOUTH EVERY DAY, Disp: 30 tablet, Rfl: 1    Misc Natural Products (LUTEIN VISION BLEND PO), Take 22 mg by mouth Daily., Disp: , Rfl:     Multiple Vitamins-Minerals (MULTI FOR HER 50+ PO), Take  by mouth., Disp: , Rfl:     ALLERGIES  Bactrim [sulfamethoxazole-trimethoprim], Carbocaine [mepivacaine hcl], Novocain [procaine], Penicillins, and Latex    VITALS  Vitals:    06/13/23 0810   BP: 158/98   BP Location: Left arm   Patient Position: Sitting   Cuff Size: Large Adult   Weight: 79.9 kg (176 lb 3.2 oz)   Height: 172.7 cm (68\")       PHYSICAL EXAM  Debilities/Disabilities Identified: None  Emotional Behavior: Appropriate  Wt Readings from Last 3 Encounters:   06/13/23 79.9 kg (176 lb 3.2 oz)   06/01/23 78.9 kg (174 lb)   04/13/23 81.2 kg (179 lb)     Ht Readings from Last 1 Encounters:   06/13/23 172.7 cm (68\")     Body mass index is 26.79 kg/m².  Physical Exam  Constitutional:       Appearance: She is well-developed. She is not diaphoretic.   HENT:      Head: Normocephalic and atraumatic.   Eyes:      General: No scleral icterus.     Conjunctiva/sclera: Conjunctivae normal.      Pupils: Pupils are equal, round, and reactive to light.   Neck:      Thyroid: No thyromegaly.   Cardiovascular:      Rate and Rhythm: Normal rate and regular rhythm.      Heart sounds: Normal heart sounds. No murmur heard.    No gallop.   Pulmonary:      Effort: Pulmonary effort is normal.      Breath sounds: Normal breath sounds. No wheezing or rales.   Abdominal:      General: Bowel sounds are normal. " There is no distension or abdominal bruit.      Palpations: Abdomen is soft. There is no shifting dullness, fluid wave or mass.      Tenderness: There is no abdominal tenderness. There is no guarding. Negative signs include Johnson's sign.      Hernia: There is no hernia in the ventral area.   Musculoskeletal:         General: Normal range of motion.      Cervical back: Normal range of motion and neck supple.   Lymphadenopathy:      Cervical: No cervical adenopathy.   Skin:     General: Skin is warm and dry.      Findings: No erythema or rash.   Neurological:      Mental Status: She is alert and oriented to person, place, and time.   Psychiatric:         Mood and Affect: Mood normal.         Behavior: Behavior normal.       CLINICAL DATA REVIEWED   reviewed previous lab results and integrated with today's visit, reviewed notes from other physicians and/or last GI encounter, reviewed previous endoscopy results and available photos, reviewed surgical pathology results from previous biopsies    ASSESSMENT  Diagnoses and all orders for this visit:    Fecal smearing    Family history of colon cancer in father    Chronic idiopathic constipation          PLAN  For now Daily FIber ( Benefiber 10 gms) and Daily Kegel exercises    Return in about 2 months (around 8/13/2023).    I have discussed the above plan with the patient.  They verbalize understanding and are in agreement with the plan.  They have been advised to contact the office for any questions, concerns, or changes related to their health.

## 2023-07-26 ENCOUNTER — OFFICE VISIT (OUTPATIENT)
Dept: FAMILY MEDICINE CLINIC | Facility: CLINIC | Age: 68
End: 2023-07-26
Payer: MEDICARE

## 2023-07-26 VITALS
HEIGHT: 68 IN | HEART RATE: 71 BPM | WEIGHT: 172 LBS | SYSTOLIC BLOOD PRESSURE: 116 MMHG | BODY MASS INDEX: 26.07 KG/M2 | OXYGEN SATURATION: 98 % | DIASTOLIC BLOOD PRESSURE: 68 MMHG

## 2023-07-26 DIAGNOSIS — Z00.00 MEDICARE ANNUAL WELLNESS VISIT, SUBSEQUENT: Primary | ICD-10-CM

## 2023-07-26 DIAGNOSIS — E78.00 HYPERCHOLESTEROLEMIA: ICD-10-CM

## 2023-07-26 NOTE — PROGRESS NOTES
The ABCs of the Annual Wellness Visit  Subsequent Medicare Wellness Visit    Subjective    Juli Manzanares is a 68 y.o. female who presents for a Subsequent Medicare Wellness Visit.    The following portions of the patient's history were reviewed and   updated as appropriate: allergies, current medications, past family history, past medical history, past social history, past surgical history, and problem list.    Compared to one year ago, the patient feels her physical   health is better.    Compared to one year ago, the patient feels her mental   health is better.    Recent Hospitalizations:  This patient has had a [facility] admission record on file within the last 365 days.    Current Medical Providers:  Patient Care Team:  Hector Keene MD as PCP - General (Internal Medicine)  Lurdes Nielson MD as Consulting Physician (Obstetrics and Gynecology)  Israel Heredia II, MD as Consulting Physician (Hematology and Oncology)  Todd Wong MD as Consulting Physician (Gastroenterology)    Outpatient Medications Prior to Visit   Medication Sig Dispense Refill    BIOTIN PO Take 4,000 mcg by mouth.      calcium citrate-vitamin d (CITRACAL) 200-250 MG-UNIT tablet tablet Take  by mouth Daily.      cetirizine (zyrTEC) 10 MG tablet       Cholecalciferol (VITAMIN D3) 5000 UNITS capsule capsule Take 1 capsule by mouth Daily.      clobetasol (TEMOVATE) 0.05 % cream APPLY TO AFFECTED AREA TWICE A DAY ECZEMA UP TO 2 WEEKS      meloxicam (MOBIC) 7.5 MG tablet TAKE 1 TABLET BY MOUTH EVERY DAY 30 tablet 1    Misc Natural Products (LUTEIN VISION BLEND PO) Take 22 mg by mouth Daily.      Multiple Vitamins-Minerals (MULTI FOR HER 50+ PO) Take  by mouth.       No facility-administered medications prior to visit.       No opioid medication identified on active medication list. I have reviewed chart for other potential  high risk medication/s and harmful drug interactions in the elderly.        Aspirin is not  "on active medication list.  Aspirin use is not indicated based on review of current medical condition/s. Risk of harm outweighs potential benefits.  .    Patient Active Problem List   Diagnosis    Female pattern hair loss    Maxillary sinusitis    Medicare annual wellness visit, subsequent    Chronic low back pain    Need for vaccination    Hypercholesterolemia    Leukocytopenia    Acute bronchitis    Rectal bleeding    Other idiopathic scoliosis, thoracolumbar region    Pars defect of lumbar spine     Advance Care Planning   Advance Care Planning     Advance Directive is on file.  ACP discussion was held with the patient during this visit. Patient has an advance directive in EMR which is still valid.      Objective    Vitals:    23 1306   BP: 116/68   BP Location: Right arm   Patient Position: Sitting   Pulse: 71   SpO2: 98%   Weight: 78 kg (172 lb)   Height: 171.5 cm (67.5\")     Estimated body mass index is 26.54 kg/m² as calculated from the following:    Height as of this encounter: 171.5 cm (67.5\").    Weight as of this encounter: 78 kg (172 lb).    BMI is >= 25 and <30. (Overweight) The following options were offered after discussion;: exercise counseling/recommendations and nutrition counseling/recommendations      Does the patient have evidence of cognitive impairment? No    Lab Results   Component Value Date    CHLPL 215 (H) 2023    TRIG 56 2023    HDL 76 (H) 2023     (H) 2023    VLDL 10 2023        HEALTH RISK ASSESSMENT    Smoking Status:  Social History     Tobacco Use   Smoking Status Never   Smokeless Tobacco Never     Alcohol Consumption:  Social History     Substance and Sexual Activity   Alcohol Use Not Currently    Comment: 2 - 3 drinks yearly     Fall Risk Screen:    STEADI Fall Risk Assessment was completed, and patient is at LOW risk for falls.Assessment completed on:2023    Depression Screenin/26/2023     1:06 PM   PHQ-2/PHQ-9 Depression " Screening   Little Interest or Pleasure in Doing Things 0-->not at all   Feeling Down, Depressed or Hopeless 0-->not at all   PHQ-9: Brief Depression Severity Measure Score 0       Health Habits and Functional and Cognitive Screenin/26/2023     1:06 PM   Functional & Cognitive Status   Do you have difficulty preparing food and eating? No   Do you have difficulty bathing yourself, getting dressed or grooming yourself? No   Do you have difficulty using the toilet? No   Do you have difficulty moving around from place to place? No   Do you have trouble with steps or getting out of a bed or a chair? No   Current Diet Well Balanced Diet   Dental Exam Up to date   Eye Exam Up to date   Exercise (times per week) 4 times per week   Current Exercises Include Walking   Do you need help using the phone?  No   Are you deaf or do you have serious difficulty hearing?  No   Do you need help to go to places out of walking distance? No   Do you need help shopping? No   Do you need help preparing meals?  No   Do you need help with housework?  No   Do you need help with laundry? No   Do you need help taking your medications? No   Do you need help managing money? No   Do you ever drive or ride in a car without wearing a seat belt? No   Have you felt unusual stress, anger or loneliness in the last month? No   Who do you live with? Spouse   If you need help, do you have trouble finding someone available to you? No   Have you been bothered in the last four weeks by sexual problems? No   Do you have difficulty concentrating, remembering or making decisions? No       Age-appropriate Screening Schedule:  Refer to the list below for future screening recommendations based on patient's age, sex and/or medical conditions. Orders for these recommended tests are listed in the plan section. The patient has been provided with a written plan.    Health Maintenance   Topic Date Due    TDAP/TD VACCINES (3 - Td or Tdap) 2014    COVID-19  "Vaccine (4 - Pfizer series) 09/04/2023 (Originally 2/7/2023)    INFLUENZA VACCINE  10/01/2023    DXA SCAN  01/12/2024    LIPID PANEL  07/20/2024    ANNUAL WELLNESS VISIT  07/26/2024    MAMMOGRAM  07/12/2025    PAP SMEAR  07/12/2026    COLORECTAL CANCER SCREENING  07/12/2027    HEPATITIS C SCREENING  Completed    Pneumococcal Vaccine 65+  Completed    ZOSTER VACCINE  Completed                  CMS Preventative Services Quick Reference  Risk Factors Identified During Encounter  None Identified  The above risks/problems have been discussed with the patient.  Pertinent information has been shared with the patient in the After Visit Summary.  An After Visit Summary and PPPS were made available to the patient.    Follow Up:   Next Medicare Wellness visit to be scheduled in 1 year.       Additional E&M Note during same encounter follows:  Patient has multiple medical problems which are significant and separately identifiable that require additional work above and beyond the Medicare Wellness Visit.      Chief Complaint  Medicare Wellness-subsequent    Subjective        This patient is here to follow-up for a Medicare subsequent wellness visit.  Also for Hypercholesterolemia,    Juli Jc Glenna is also being seen today for follow-up on labs and hypercholesterolemia.    Review of Systems   Constitutional: Negative.    HENT: Negative.     Eyes: Negative.    Respiratory: Negative.     Cardiovascular: Negative.    Gastrointestinal: Negative.    Endocrine: Negative.    Genitourinary: Negative.    Musculoskeletal: Negative.    Allergic/Immunologic: Negative.    Neurological: Negative.    Psychiatric/Behavioral: Negative.       Objective   Vital Signs:  /68 (BP Location: Right arm, Patient Position: Sitting)   Pulse 71   Ht 171.5 cm (67.5\")   Wt 78 kg (172 lb)   SpO2 98%   BMI 26.54 kg/m²     Physical Exam  Vitals and nursing note reviewed.   Constitutional:       General: She is not in acute distress.     " Appearance: Normal appearance. She is normal weight. She is not ill-appearing, toxic-appearing or diaphoretic.      Comments: Pleasant, neatly groomed, no distress.   Neck:      Vascular: No carotid bruit.   Cardiovascular:      Rate and Rhythm: Regular rhythm.      Heart sounds: Normal heart sounds. No murmur heard.    No gallop.   Pulmonary:      Effort: No respiratory distress.      Breath sounds: Normal breath sounds. No wheezing or rales.   Neurological:      Mental Status: She is alert and oriented to person, place, and time.   Psychiatric:         Mood and Affect: Mood normal.         Behavior: Behavior normal.         Thought Content: Thought content normal.                       Assessment and Plan   Diagnoses and all orders for this visit:    1. Medicare annual wellness visit, subsequent (Primary)    2. Hypercholesterolemia    She enjoys good health overall however she is modestly overweight, who encouraged her to do her best to lose a few pounds to be of regular aerobic activity as recommended by the American [employer] Association of brisk aerobic activity 30 minutes a day 5 days a week and decreased quantity of calories.    Her cholesterol is elevated but her vascular screening from 2021 revealed no plaque in her carotid arteries and no aneurysm changes in her aorta.    I asked her to go for vascular screening again that she is due is been about 2 years since she had that done.    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    I would like to see her back in about 6 months.             Follow Up   No follow-ups on file.  Patient was given instructions and counseling regarding her condition or for health maintenance advice. Please see specific information pulled into the AVS if appropriate.

## 2023-08-15 ENCOUNTER — OFFICE VISIT (OUTPATIENT)
Dept: GASTROENTEROLOGY | Facility: CLINIC | Age: 68
End: 2023-08-15
Payer: MEDICARE

## 2023-08-15 ENCOUNTER — TELEPHONE (OUTPATIENT)
Dept: GASTROENTEROLOGY | Facility: CLINIC | Age: 68
End: 2023-08-15
Payer: MEDICARE

## 2023-08-15 VITALS
BODY MASS INDEX: 26.31 KG/M2 | HEIGHT: 68 IN | SYSTOLIC BLOOD PRESSURE: 128 MMHG | DIASTOLIC BLOOD PRESSURE: 90 MMHG | WEIGHT: 173.6 LBS

## 2023-08-15 DIAGNOSIS — R15.1 FECAL SMEARING: ICD-10-CM

## 2023-08-15 DIAGNOSIS — Z80.0 FAMILY HISTORY OF COLON CANCER: Primary | ICD-10-CM

## 2023-08-15 NOTE — TELEPHONE ENCOUNTER
Spoke with patient.  Scheduled at [facility] on 12/01/2023 at 10:15am - arrive 9am.  Will e-mail instructions [email or web address] today.

## 2023-08-15 NOTE — TELEPHONE ENCOUNTER
----- Message from Todd Wong MD sent at 8/15/2023  9:07 AM EDT -----  Is time for her screening colon but no rush so whenever an early appt opens up between now and 7/2024

## 2023-08-15 NOTE — PROGRESS NOTES
PATIENT INFORMATION  Juli Manzanares       - 1955    CHIEF COMPLAINT  Chief Complaint   Patient presents with    Chronic idiopathic constipation    Fecal smearing       HISTORY OF PRESENT ILLNESS  Feels back to normal with daily fiber and Kegel exercises    Her last Colon was  and in  her father was diagnosed with CRC at age of 95 and  one month later.    Discussed timing of her screening colon and would be amenable to anytime between now and 2024          REVIEWED PERTINENT RESULTS/ LABS  No results found for: CASEREPORT, FINALDX  Lab Results   Component Value Date    HGB 12.9 2023    MCV 85.1 2023     2023    ALT 20 2023    AST 28 2023    TRIG 56 2023      No results found.    REVIEW OF SYSTEMS  Review of Systems   Constitutional:  Negative for activity change, chills, fever and unexpected weight change.   HENT:  Negative for congestion.    Eyes:  Negative for visual disturbance.   Respiratory:  Negative for shortness of breath.    Cardiovascular:  Negative for chest pain and palpitations.   Gastrointestinal:  Negative for abdominal pain and blood in stool.   Endocrine: Negative for cold intolerance and heat intolerance.   Genitourinary:  Negative for hematuria.   Musculoskeletal:  Negative for gait problem.   Skin:  Negative for color change.   Allergic/Immunologic: Negative for immunocompromised state.   Neurological:  Negative for weakness and light-headedness.   Hematological:  Negative for adenopathy.   Psychiatric/Behavioral:  Negative for sleep disturbance. The patient is not nervous/anxious.        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 2021    Pain & discomfort gradually worsened & it is difficult to stand up straight.    Pneumonia Sept 2002    Scoliosis 1990    Discomfort gradually worsened through the years.         SURGICAL HISTORY  Past Surgical History:   Procedure Laterality Date    COLONOSCOPY N/A 07/12/2017    Procedure: COLONOSCOPY into cecum;  Surgeon: Farida Enriquez MD;  Location: [facility] ENDOSCOPY;  Service:     EYE SURGERY  Feb. 2023    I had cataract surgery & implants in both eyes on 2/14/23 & 2/23/23    SKIN GRAFT      LOWER MOUTH     WISDOM TOOTH EXTRACTION           FAMILY HISTORY  Family History   Problem Relation Age of Onset    Cancer Mother         Lung cancer in 2005    Lung cancer Mother     Hypertension Father     Diabetes Father     Kidney disease Father         My father had prostate cancer in the late 1980s    Prostate cancer Father     Cancer Father         Colon cancer in 2021    Vision loss Father         Had glaucoma    Cancer Cousin          SOCIAL HISTORY  Social History     Occupational History     Employer: RETIRED   Tobacco Use    Smoking status: Never    Smokeless tobacco: Never   Vaping Use    Vaping Use: Never used   Substance and Sexual Activity    Alcohol use: Not Currently     Comment: 2 - 3 drinks yearly    Drug use: Never    Sexual activity: Not Currently     Birth control/protection: Post-menopausal         CURRENT MEDICATIONS    Current Outpatient Medications:     BIOTIN PO, Take 4,000 mcg by mouth., Disp: , Rfl:     calcium citrate-vitamin d (CITRACAL) 200-250 MG-UNIT tablet tablet, Take  by mouth Daily., Disp: , Rfl:     cetirizine (zyrTEC) 10 MG tablet,  ", Disp: , Rfl:     Cholecalciferol (VITAMIN D3) 5000 UNITS capsule capsule, Take 1 capsule by mouth Daily., Disp: , Rfl:     clobetasol (TEMOVATE) 0.05 % cream, APPLY TO AFFECTED AREA TWICE A DAY ECZEMA UP TO 2 WEEKS, Disp: , Rfl:     meloxicam (MOBIC) 7.5 MG tablet, TAKE 1 TABLET BY MOUTH EVERY DAY, Disp: 30 tablet, Rfl: 1    Misc Natural Products (LUTEIN VISION BLEND PO), Take 22 mg by mouth Daily., Disp: , Rfl:     Multiple Vitamins-Minerals (MULTI FOR HER 50+ PO), Take  by mouth., Disp: , Rfl:     Wheat Dextrin (BENEFIBER DRINK MIX PO), Take  by mouth Every Morning., Disp: , Rfl:     ALLERGIES  Bactrim [sulfamethoxazole-trimethoprim], Carbocaine [mepivacaine hcl], Novocain [procaine], Penicillins, and Latex    VITALS  Vitals:    08/15/23 0841   BP: 128/90   BP Location: Left arm   Patient Position: Sitting   Cuff Size: Adult   Weight: 78.7 kg (173 lb 9.6 oz)   Height: 171.5 cm (67.5\")       PHYSICAL EXAM  Debilities/Disabilities Identified: None  Emotional Behavior: Appropriate  Wt Readings from Last 3 Encounters:   08/15/23 78.7 kg (173 lb 9.6 oz)   07/26/23 78 kg (172 lb)   06/13/23 79.9 kg (176 lb 3.2 oz)     Ht Readings from Last 1 Encounters:   08/15/23 171.5 cm (67.5\")     Body mass index is 26.79 kg/mý.  Physical Exam  Constitutional:       Appearance: She is well-developed. She is not diaphoretic.   Eyes:      General: No scleral icterus.     Conjunctiva/sclera: Conjunctivae normal.      Pupils: Pupils are equal, round, and reactive to light.   Neck:      Thyroid: No thyromegaly.   Cardiovascular:      Rate and Rhythm: Normal rate and regular rhythm.      Heart sounds: Normal heart sounds. No murmur heard.    No gallop.   Pulmonary:      Effort: Pulmonary effort is normal.      Breath sounds: Normal breath sounds. No wheezing or rales.   Abdominal:      General: Bowel sounds are normal. There is no distension or abdominal bruit.      Palpations: Abdomen is soft. There is no shifting dullness, fluid wave " or mass.      Tenderness: There is no abdominal tenderness. There is no guarding. Negative signs include Johnson's sign.      Hernia: There is no hernia in the ventral area.   Musculoskeletal:         General: Normal range of motion.      Cervical back: Normal range of motion and neck supple.   Lymphadenopathy:      Cervical: No cervical adenopathy.   Skin:     General: Skin is warm and dry.      Findings: No erythema or rash.   Neurological:      Mental Status: She is alert and oriented to person, place, and time.   Psychiatric:         Mood and Affect: Mood normal.         Behavior: Behavior normal.       CLINICAL DATA REVIEWED   reviewed previous lab results and integrated with today's visit, reviewed notes from other physicians and/or last GI encounter, reviewed previous endoscopy results and available photos, reviewed surgical pathology results from previous biopsies    ASSESSMENT  Diagnoses and all orders for this visit:    Family history of colon cancer  -     Case Request; Standing  -     Case Request    Fecal smearing    Other orders  -     Wheat Dextrin (BENEFIBER DRINK MIX PO); Take  by mouth Every Morning.  -     Obtain informed consent; Standing  -     Follow Anesthesia Guidelines / Protocol; Future  -     Verify bowel prep was successful; Standing  -     Give tap water enema if bowel prep was insufficient; Standing          PLAN  Return in about 1 year (around 8/15/2024).    I have discussed the above plan with the patient.  They verbalize understanding and are in agreement with the plan.  They have been advised to contact the office for any questions, concerns, or changes related to their health.

## 2023-10-09 NOTE — TELEPHONE ENCOUNTER
Rx Refill Note  Requested Prescriptions     Pending Prescriptions Disp Refills    meloxicam (MOBIC) 7.5 MG tablet [Pharmacy Med Name: MELOXICAM 7.5 MG TABLET] 30 tablet 1     Sig: TAKE 1 TABLET BY MOUTH EVERY DAY      Last office visit with prescribing clinician: 7/26/2023   Last telemedicine visit with prescribing clinician: Visit date not found   Next office visit with prescribing clinician: 1/31/2024                         Would you like a call back once the refill request has been completed: [] Yes [] No    If the office needs to give you a call back, can they leave a voicemail: [] Yes [] No    Fabricio Watkins MA  10/09/23, 08:33 EDT

## 2023-10-10 RX ORDER — MELOXICAM 7.5 MG/1
TABLET ORAL
Qty: 30 TABLET | Refills: 1 | Status: SHIPPED | OUTPATIENT
Start: 2023-10-10

## 2023-11-27 ENCOUNTER — TELEPHONE (OUTPATIENT)
Dept: GASTROENTEROLOGY | Facility: CLINIC | Age: 68
End: 2023-11-27
Payer: MEDICARE

## 2023-11-27 NOTE — TELEPHONE ENCOUNTER
Patient called about her colonoscopy on 12/01/2023.  She states has sinus infection.  Started on DOXYCYCLINE 100 mg twice a day on Saturday, 11/25/2023.  Asking if she needs to stop before procedure.  Please advise.

## 2023-11-30 RX ORDER — DOXYCYCLINE HYCLATE 100 MG
100 TABLET ORAL 2 TIMES DAILY
COMMUNITY
Start: 2023-11-25

## 2023-11-30 NOTE — SIGNIFICANT NOTE
Education provided the Patient on the following:    - Nothing to Eat or Drink after MN the night before the procedure    - Avoid red/purple fluids while completing their bowel prep as ordered by physician  -Contact Gastrointerologist office for any questions about specific details regarding colon prep    -You will need to have someone drive you home after your colonoscopy and remain with you for 24 hours after the procedure  - The date of your Surgery, you may have one visitor at bedside or within 10-15 minutes of [facility]  -Please wear warm socks when you arrive for your colonoscopy  -Remove all jewelry and leave any valuables before arriving the day of your procedure (all will have to be removed before leaving preop)  -You will need to arrive at 0845 on 12/1/23 for your colonoscopy    -Feel free to contact us at: [phone] with any additional questions/concerns

## 2023-12-01 ENCOUNTER — ANESTHESIA EVENT (OUTPATIENT)
Dept: SURGERY | Facility: SURGERY CENTER | Age: 68
End: 2023-12-01
Payer: MEDICARE

## 2023-12-01 ENCOUNTER — HOSPITAL ENCOUNTER (OUTPATIENT)
Facility: SURGERY CENTER | Age: 68
Setting detail: HOSPITAL OUTPATIENT SURGERY
Discharge: HOME | End: 2023-12-01
Attending: INTERNAL MEDICINE | Admitting: INTERNAL MEDICINE
Payer: MEDICARE

## 2023-12-01 ENCOUNTER — ANESTHESIA (OUTPATIENT)
Dept: SURGERY | Facility: SURGERY CENTER | Age: 68
End: 2023-12-01
Payer: MEDICARE

## 2023-12-01 VITALS
RESPIRATION RATE: 16 BRPM | BODY MASS INDEX: 26.87 KG/M2 | OXYGEN SATURATION: 100 % | SYSTOLIC BLOOD PRESSURE: 132 MMHG | HEIGHT: 67 IN | HEART RATE: 62 BPM | WEIGHT: 171.2 LBS | TEMPERATURE: 97.7 F | DIASTOLIC BLOOD PRESSURE: 75 MMHG

## 2023-12-01 PROCEDURE — 25010000002 PROPOFOL 10 MG/ML EMULSION: Performed by: ANESTHESIOLOGY

## 2023-12-01 PROCEDURE — G0105 COLORECTAL SCRN; HI RISK IND: HCPCS | Performed by: INTERNAL MEDICINE

## 2023-12-01 PROCEDURE — 25010000002 LIDOCAINE 1 % SOLUTION: Performed by: ANESTHESIOLOGY

## 2023-12-01 PROCEDURE — 25010000002 PROPOFOL 1000 MG/100ML EMULSION: Performed by: ANESTHESIOLOGY

## 2023-12-01 PROCEDURE — 25810000003 LACTATED RINGERS PER 1000 ML: Performed by: INTERNAL MEDICINE

## 2023-12-01 RX ORDER — SODIUM CHLORIDE, SODIUM LACTATE, POTASSIUM CHLORIDE, CALCIUM CHLORIDE 600; 310; 30; 20 MG/100ML; MG/100ML; MG/100ML; MG/100ML
1000 INJECTION, SOLUTION INTRAVENOUS CONTINUOUS
Status: DISCONTINUED | OUTPATIENT
Start: 2023-12-01 | End: 2023-12-01 | Stop reason: HOSPADM

## 2023-12-01 RX ORDER — SODIUM CHLORIDE 0.9 % (FLUSH) 0.9 %
10 SYRINGE (ML) INJECTION AS NEEDED
Status: DISCONTINUED | OUTPATIENT
Start: 2023-12-01 | End: 2023-12-01 | Stop reason: HOSPADM

## 2023-12-01 RX ORDER — LIDOCAINE HYDROCHLORIDE 10 MG/ML
INJECTION, SOLUTION INFILTRATION; PERINEURAL AS NEEDED
Status: DISCONTINUED | OUTPATIENT
Start: 2023-12-01 | End: 2023-12-01 | Stop reason: SURG

## 2023-12-01 RX ORDER — LIDOCAINE HYDROCHLORIDE 10 MG/ML
0.5 INJECTION, SOLUTION INFILTRATION; PERINEURAL ONCE AS NEEDED
Status: DISCONTINUED | OUTPATIENT
Start: 2023-12-01 | End: 2023-12-01 | Stop reason: HOSPADM

## 2023-12-01 RX ORDER — PROPOFOL 10 MG/ML
INJECTION, EMULSION INTRAVENOUS AS NEEDED
Status: DISCONTINUED | OUTPATIENT
Start: 2023-12-01 | End: 2023-12-01 | Stop reason: SURG

## 2023-12-01 RX ADMIN — PROPOFOL 200 MCG/KG/MIN: 10 INJECTION, EMULSION INTRAVENOUS at 10:04

## 2023-12-01 RX ADMIN — PROPOFOL 150 MG: 10 INJECTION, EMULSION INTRAVENOUS at 10:04

## 2023-12-01 RX ADMIN — SODIUM CHLORIDE, POTASSIUM CHLORIDE, SODIUM LACTATE AND CALCIUM CHLORIDE 1000 ML: 600; 310; 30; 20 INJECTION, SOLUTION INTRAVENOUS at 08:50

## 2023-12-01 RX ADMIN — LIDOCAINE HYDROCHLORIDE 20 MG: 10 INJECTION, SOLUTION INFILTRATION; PERINEURAL at 10:04

## 2023-12-01 NOTE — BRIEF OP NOTE
COLONOSCOPY  Progress Note    Juli Manzanares  12/1/2023    Pre-op Diagnosis:   Family history of colon cancer [Z80.0]       Post-Op Diagnosis Codes:     * Family history of colon cancer [Z80.0]     * Diverticulosis [K57.90]    Procedure/CPT® Codes:        Procedure(s):  COLONOSCOPY to Cecum              Surgeon(s):  Todd Wong MD    Anesthesia: Monitored Anesthesia Care    Staff:   Endo Technician: Eugenia Galdamez RN  Endo Nurse: Yue Jacob RN         Estimated Blood Loss: none    Urine Voided: * No values recorded between 12/1/2023 10:00 AM and 12/1/2023 10:22 AM *    Specimens:                None          Drains: * No LDAs found *    Findings: Colon to Cecum Good Prep  Sigmoid Diverticulosis        Complications: None          Todd Wong MD     Date: 12/1/2023  Time: 10:24 EST

## 2023-12-01 NOTE — ANESTHESIA PREPROCEDURE EVALUATION
Anesthesia Evaluation     Patient summary reviewed and Nursing notes reviewed   NPO Solid Status: > 8 hours  NPO Liquid Status: > 2 hours           Airway   Mallampati: I  TM distance: >3 FB  Neck ROM: full  No difficulty expected  Dental - normal exam     Pulmonary - normal exam   (+) asthma,  (-) pneumonia  Cardiovascular - normal exam  Exercise tolerance: good (4-7 METS)    (+) hyperlipidemia      Neuro/Psych- negative ROS  GI/Hepatic/Renal/Endo - negative ROS   (-) GI bleed    Musculoskeletal (-) negative ROS    Abdominal  - normal exam    Bowel sounds: normal.   Substance History - negative use     OB/GYN negative ob/gyn ROS         Other                    Anesthesia Plan    ASA 2     MAC     intravenous induction     Anesthetic plan, risks, benefits, and alternatives have been provided, discussed and informed consent has been obtained with: patient.  Pre-procedure education provided    CODE STATUS:

## 2023-12-01 NOTE — H&P
Patient Care Team:  Hector Keene MD as PCP - General (Internal Medicine)  Lurdes Nielson MD as Consulting Physician (Obstetrics and Gynecology)  Israel Heredia II, MD as Consulting Physician (Hematology and Oncology)  Todd Wong MD as Consulting Physician (Gastroenterology)    CHIEF COMPLAINT: Family hx of CRC or polyps    HISTORY OF PRESENT ILLNESS:  Last exam was 2017    Past Medical History:   Diagnosis Date    Allergic 1990s    Penicillin, Bactrim, and Carbocaine    Arthritis     Asthma     H/O colonoscopy     Hemorrhoids     Low back pain Nov. 2021    Pain & discomfort gradually worsened & it is difficult to stand up straight.    Pneumonia Sept 2002    Scoliosis 1990    Discomfort gradually worsened through the years.     Past Surgical History:   Procedure Laterality Date    COLONOSCOPY N/A 07/12/2017    Procedure: COLONOSCOPY into cecum;  Surgeon: Farida Enriquez MD;  Location: [facility] ENDOSCOPY;  Service:     EYE SURGERY  Feb. 2023    I had cataract surgery & implants in both eyes on 2/14/23 & 2/23/23    SKIN GRAFT      LOWER MOUTH     WISDOM TOOTH EXTRACTION       Family History   Problem Relation Age of Onset    Cancer Mother         Lung cancer in 2005    Lung cancer Mother     Hypertension Father     Diabetes Father     Kidney disease Father         My father had prostate cancer in the late 1980s    Prostate cancer Father     Cancer Father         Colon cancer in 2021    Vision loss Father         Had glaucoma    Cancer Cousin      Social History     Tobacco Use    Smoking status: Never    Smokeless tobacco: Never   Vaping Use    Vaping Use: Never used   Substance Use Topics    Alcohol use: Not Currently     Comment: 2 - 3 drinks yearly    Drug use: Never     Medications Prior to Admission   Medication Sig Dispense Refill Last Dose    doxycycline (VIBRAMYICN) 100 MG tablet Take 1 tablet by mouth 2 (Two) Times a Day.       BIOTIN PO Take 4,000 mcg by mouth.       calcium  "citrate-vitamin d (CITRACAL) 200-250 MG-UNIT tablet tablet Take  by mouth Daily.       cetirizine (zyrTEC) 10 MG tablet        Cholecalciferol (VITAMIN D3) 5000 UNITS capsule capsule Take 1 capsule by mouth Daily.       clobetasol (TEMOVATE) 0.05 % cream APPLY TO AFFECTED AREA TWICE A DAY ECZEMA UP TO 2 WEEKS       meloxicam (MOBIC) 7.5 MG tablet TAKE 1 TABLET BY MOUTH EVERY DAY 30 tablet 1     Misc Natural Products (LUTEIN VISION BLEND PO) Take 22 mg by mouth Daily.       Multiple Vitamins-Minerals (MULTI FOR HER 50+ PO) Take  by mouth.       Wheat Dextrin (BENEFIBER DRINK MIX PO) Take  by mouth Every Morning.        Allergies:  Bactrim [sulfamethoxazole-trimethoprim], Carbocaine [mepivacaine hcl], Novocain [procaine], Penicillins, and Latex    REVIEW OF SYSTEMS:  Please see the above history of present illness for pertinent positives and negatives.  The remainder of the patient's systems have been reviewed and are negative.     Vital Signs       Flowsheet Rows      Flowsheet Row First Filed Value   Admission Height 170.2 cm (67\") Documented at 11/30/2023 1129   Admission Weight 79.8 kg (176 lb) Documented at 11/30/2023 1129             Physical Exam:  Physical Exam   Constitutional: Patient appears well-developed and well-nourished and in no acute distress   HEENT:   Head: Normocephalic and atraumatic.   Eyes:  Pupils are equal, round, and reactive to light. EOM are intact. Sclerae are anicteric and non-injected.  Mouth and Throat: Patient has moist mucous membranes. Oropharynx is clear of any erythema or exudate.     Neck: Neck supple. No JVD present. No thyromegaly present. No lymphadenopathy present.  Cardiovascular: Regular rate, regular rhythm, S1 normal and S2 normal.  Exam reveals no gallop and no friction rub.  No murmur heard.  Pulmonary/Chest: Lungs are clear to auscultation bilaterally. No respiratory distress. No wheezes. No rhonchi. No rales.   Abdominal: Soft. Bowel sounds are normal. No distension " and no mass. There is no hepatosplenomegaly. There is no tenderness.   Musculoskeletal: Normal Muscle tone  Extremities: No edema. Pulses are palpable in all 4 extremities.  Neurological: Patient is alert and oriented to person, place, and time. Cranial nerves II-XII are grossly intact with no focal deficits.  Skin: Skin is warm. No rash noted. Nails show no clubbing.  No cyanosis or erythema.    Debilities/Disabilities Identified: None  Emotional Behavior: Appropriate     Results Review:   I reviewed the patient's new clinical results.    Lab Results (most recent)       None            Imaging Results (Most Recent)       None          reviewed    ECG/EMG Results (most recent)       None          reviewed    Assessment & Plan   Screening CRC and Family hx of CRC or polyps/  colonoscopy      I discussed the patient's findings and my recommendations with patient.     Todd Wong MD  12/01/23  08:28 EST    Time: 10 min prior to procedure.

## 2023-12-01 NOTE — ANESTHESIA POSTPROCEDURE EVALUATION
Patient: Juli Manzanares    Procedure Summary       Date: 12/01/23 Room / Location: [facility]    Anesthesia Start: 1002 Anesthesia Stop: 1033    Procedure: COLONOSCOPY to Cecum Diagnosis:       Family history of colon cancer      Diverticulosis      (Family history of colon cancer [Z80.0])    Surgeons: Todd Wong MD Provider: Brady Leal MD    Anesthesia Type: MAC ASA Status: 2            Anesthesia Type: MAC    Vitals  Vitals Value Taken Time   /75 12/01/23 1051   Temp 36.5 °C (97.7 °F) 12/01/23 1030   Pulse 62 12/01/23 1051   Resp 16 12/01/23 1050   SpO2 100 % 12/01/23 1051           Anesthesia Post Evaluation

## 2024-01-15 RX ORDER — MELOXICAM 7.5 MG/1
TABLET ORAL
Qty: 30 TABLET | Refills: 1 | Status: SHIPPED | OUTPATIENT
Start: 2024-01-15

## 2024-01-15 NOTE — TELEPHONE ENCOUNTER
Rx Refill Note  Requested Prescriptions     Pending Prescriptions Disp Refills    meloxicam (MOBIC) 7.5 MG tablet [Pharmacy Med Name: MELOXICAM 7.5 MG TABLET] 30 tablet 1     Sig: TAKE 1 TABLET BY MOUTH EVERY DAY      Last office visit with prescribing clinician: 7/26/2023   Last telemedicine visit with prescribing clinician: Visit date not found   Next office visit with prescribing clinician: 1/31/2024

## 2024-01-23 DIAGNOSIS — E78.00 HYPERCHOLESTEROLEMIA: Primary | ICD-10-CM

## 2024-01-26 LAB
ALBUMIN SERPL-MCNC: 5 G/DL (ref 3.5–5.2)
ALBUMIN/GLOB SERPL: 2.5 G/DL
ALP SERPL-CCNC: 75 U/L (ref 39–117)
ALT SERPL-CCNC: 16 U/L (ref 1–33)
APPEARANCE UR: CLEAR
AST SERPL-CCNC: 28 U/L (ref 1–32)
BACTERIA #/AREA URNS HPF: NORMAL /HPF
BASOPHILS # BLD AUTO: 0.02 10*3/MM3 (ref 0–0.2)
BASOPHILS NFR BLD AUTO: 0.5 % (ref 0–1.5)
BILIRUB SERPL-MCNC: 0.9 MG/DL (ref 0–1.2)
BILIRUB UR QL STRIP: NEGATIVE
BUN SERPL-MCNC: 21 MG/DL (ref 8–23)
BUN/CREAT SERPL: 21.6 (ref 7–25)
CALCIUM SERPL-MCNC: 9.9 MG/DL (ref 8.6–10.5)
CASTS URNS MICRO: NORMAL
CHLORIDE SERPL-SCNC: 101 MMOL/L (ref 98–107)
CHOLEST SERPL-MCNC: 223 MG/DL (ref 0–200)
CHOLEST/HDLC SERPL: 3.19 {RATIO}
CO2 SERPL-SCNC: 25.5 MMOL/L (ref 22–29)
COLOR UR: YELLOW
CREAT SERPL-MCNC: 0.97 MG/DL (ref 0.57–1)
EGFRCR SERPLBLD CKD-EPI 2021: 63 ML/MIN/1.73
EOSINOPHIL # BLD AUTO: 0.25 10*3/MM3 (ref 0–0.4)
EOSINOPHIL NFR BLD AUTO: 6.5 % (ref 0.3–6.2)
EPI CELLS #/AREA URNS HPF: NORMAL /HPF
ERYTHROCYTE [DISTWIDTH] IN BLOOD BY AUTOMATED COUNT: 12.6 % (ref 12.3–15.4)
GLOBULIN SER CALC-MCNC: 2 GM/DL
GLUCOSE SERPL-MCNC: 95 MG/DL (ref 65–99)
GLUCOSE UR QL STRIP: NEGATIVE
HCT VFR BLD AUTO: 40.6 % (ref 34–46.6)
HDLC SERPL-MCNC: 70 MG/DL (ref 40–60)
HGB BLD-MCNC: 13.3 G/DL (ref 12–15.9)
HGB UR QL STRIP: NEGATIVE
IMM GRANULOCYTES # BLD AUTO: 0.01 10*3/MM3 (ref 0–0.05)
IMM GRANULOCYTES NFR BLD AUTO: 0.3 % (ref 0–0.5)
KETONES UR QL STRIP: NEGATIVE
LDLC SERPL CALC-MCNC: 142 MG/DL (ref 0–100)
LEUKOCYTE ESTERASE UR QL STRIP: NEGATIVE
LYMPHOCYTES # BLD AUTO: 1.29 10*3/MM3 (ref 0.7–3.1)
LYMPHOCYTES NFR BLD AUTO: 33.5 % (ref 19.6–45.3)
MCH RBC QN AUTO: 27.9 PG (ref 26.6–33)
MCHC RBC AUTO-ENTMCNC: 32.8 G/DL (ref 31.5–35.7)
MCV RBC AUTO: 85.3 FL (ref 79–97)
MONOCYTES # BLD AUTO: 0.56 10*3/MM3 (ref 0.1–0.9)
MONOCYTES NFR BLD AUTO: 14.5 % (ref 5–12)
NEUTROPHILS # BLD AUTO: 1.72 10*3/MM3 (ref 1.7–7)
NEUTROPHILS NFR BLD AUTO: 44.7 % (ref 42.7–76)
NITRITE UR QL STRIP: NEGATIVE
NRBC BLD AUTO-RTO: 0 /100 WBC (ref 0–0.2)
PH UR STRIP: 6 [PH] (ref 5–8)
PLATELET # BLD AUTO: 220 10*3/MM3 (ref 140–450)
POTASSIUM SERPL-SCNC: 5 MMOL/L (ref 3.5–5.2)
PROT SERPL-MCNC: 7 G/DL (ref 6–8.5)
PROT UR QL STRIP: NEGATIVE
RBC # BLD AUTO: 4.76 10*6/MM3 (ref 3.77–5.28)
RBC #/AREA URNS HPF: NORMAL /HPF
SODIUM SERPL-SCNC: 137 MMOL/L (ref 136–145)
SP GR UR STRIP: 1.01 (ref 1–1.03)
TRIGL SERPL-MCNC: 66 MG/DL (ref 0–150)
UROBILINOGEN UR STRIP-MCNC: NORMAL MG/DL
VLDLC SERPL CALC-MCNC: 11 MG/DL (ref 5–40)
WBC # BLD AUTO: 3.85 10*3/MM3 (ref 3.4–10.8)
WBC #/AREA URNS HPF: NORMAL /HPF

## 2024-01-31 ENCOUNTER — OFFICE VISIT (OUTPATIENT)
Dept: FAMILY MEDICINE CLINIC | Facility: CLINIC | Age: 69
End: 2024-01-31
Payer: MEDICARE

## 2024-01-31 VITALS
SYSTOLIC BLOOD PRESSURE: 130 MMHG | HEIGHT: 67 IN | BODY MASS INDEX: 27.14 KG/M2 | RESPIRATION RATE: 16 BRPM | TEMPERATURE: 97 F | WEIGHT: 172.9 LBS | DIASTOLIC BLOOD PRESSURE: 82 MMHG

## 2024-01-31 DIAGNOSIS — E78.00 HYPERCHOLESTEROLEMIA: Primary | ICD-10-CM

## 2024-01-31 PROCEDURE — 99213 OFFICE O/P EST LOW 20 MIN: CPT | Performed by: INTERNAL MEDICINE

## 2024-01-31 RX ORDER — ROSUVASTATIN CALCIUM 10 MG/1
10 TABLET, COATED ORAL DAILY
Qty: 90 TABLET | Refills: 3 | Status: SHIPPED | OUTPATIENT
Start: 2024-01-31

## 2024-02-11 NOTE — PROGRESS NOTES
Subjective   Juli Manzanares is a 68 y.o. female. Patient is here today for   Chief Complaint   Patient presents with    Hyperlipidemia          Vitals:    01/31/24 1258   BP: 130/82   Resp: 16   Temp: 97 °F (36.1 °C)     Body mass index is 27.07 kg/m².      Past Medical History:   Diagnosis Date    Allergic 1990s    Penicillin, Bactrim, and Carbocaine    Arthritis     Asthma     H/O colonoscopy     Hemorrhoids     Low back pain Nov. 2021    Pain & discomfort gradually worsened & it is difficult to stand up straight.    Pneumonia Sept 2002    Scoliosis 1990    Discomfort gradually worsened through the years.      Allergies   Allergen Reactions    Bactrim [Sulfamethoxazole-Trimethoprim] Unknown - Low Severity    Carbocaine [Mepivacaine Hcl] Unknown - Low Severity    Novocain [Procaine] Unknown - Low Severity    Penicillins Unknown - Low Severity    Latex Rash      Social History     Socioeconomic History    Marital status:      Spouse name: Naga    Years of education: College   Tobacco Use    Smoking status: Never    Smokeless tobacco: Never   Vaping Use    Vaping Use: Never used   Substance and Sexual Activity    Alcohol use: Not Currently     Comment: 2 - 3 drinks yearly    Drug use: Never    Sexual activity: Not Currently     Birth control/protection: Post-menopausal        Current Outpatient Medications:     BIOTIN PO, Take 4,000 mcg by mouth., Disp: , Rfl:     calcium citrate-vitamin d (CITRACAL) 200-250 MG-UNIT tablet tablet, Take  by mouth Daily., Disp: , Rfl:     cetirizine (zyrTEC) 10 MG tablet, , Disp: , Rfl:     Cholecalciferol (VITAMIN D3) 5000 UNITS capsule capsule, Take 1 capsule by mouth Daily., Disp: , Rfl:     clobetasol (TEMOVATE) 0.05 % cream, APPLY TO AFFECTED AREA TWICE A DAY ECZEMA UP TO 2 WEEKS, Disp: , Rfl:     meloxicam (MOBIC) 7.5 MG tablet, TAKE 1 TABLET BY MOUTH EVERY DAY, Disp: 30 tablet, Rfl: 1    Multiple Vitamins-Minerals (MULTI FOR HER 50+ PO), Take  by mouth., Disp: , Rfl:      Wheat Dextrin (BENEFIBER DRINK MIX PO), Take  by mouth Every Morning., Disp: , Rfl:     rosuvastatin (Crestor) 10 MG tablet, Take 1 tablet by mouth Daily., Disp: 90 tablet, Rfl: 3     Objective     History of Present Illness  She is here to follow-up on labs done last week.    She feels well.    She has no complaints.  Hyperlipidemia         Review of Systems   Constitutional: Negative.    HENT: Negative.     Respiratory: Negative.     Cardiovascular: Negative.    Musculoskeletal: Negative.    Psychiatric/Behavioral: Negative.         Physical Exam  Vitals and nursing note reviewed.   Constitutional:       Appearance: Normal appearance. She is normal weight.   Cardiovascular:      Rate and Rhythm: Regular rhythm.      Heart sounds: Normal heart sounds. No murmur heard.     No gallop.   Pulmonary:      Effort: No respiratory distress.      Breath sounds: Normal breath sounds. No wheezing or rales.   Psychiatric:         Mood and Affect: Mood normal.         Behavior: Behavior normal.         Thought Content: Thought content normal.           Problems Addressed this Visit          Cardiac and Vasculature    Hypercholesterolemia - Primary    Relevant Medications    rosuvastatin (Crestor) 10 MG tablet     Diagnoses         Codes Comments    Hypercholesterolemia    -  Primary ICD-10-CM: E78.00  ICD-9-CM: 272.0               PLAN  She and I did have a conversation today about her cholesterol.  He is too high and has been persistently so.  Made an effort to modify her diet to address her elevated LDL cholesterol but this has not been successful.  I asked her to start taking rosuvastatin 10 mg daily.  I have him back in about 3 months.  Fasting labs prior to that visit should include lipid profile, and comprehensive metabolic panel.  No follow-ups on file.  Answers submitted by the patient for this visit:  Other (Submitted on 1/24/2024)  Please describe your symptoms.: This is my routine 6-month check-up.  No particular  other medical problem.  Have you had these symptoms before?: No  How long have you been having these symptoms?: 1-4 days  Please list any medications you are currently taking for this condition.: N/A -- no new medications  Please describe any probable cause for these symptoms. : N/A  Primary Reason for Visit (Submitted on 1/24/2024)  What is the primary reason for your visit?: Other

## 2024-02-13 ENCOUNTER — OFFICE VISIT (OUTPATIENT)
Dept: FAMILY MEDICINE CLINIC | Facility: CLINIC | Age: 69
End: 2024-02-13
Payer: MEDICARE

## 2024-02-13 VITALS
OXYGEN SATURATION: 99 % | HEIGHT: 67 IN | TEMPERATURE: 98.8 F | BODY MASS INDEX: 26.79 KG/M2 | HEART RATE: 67 BPM | SYSTOLIC BLOOD PRESSURE: 118 MMHG | DIASTOLIC BLOOD PRESSURE: 80 MMHG | RESPIRATION RATE: 16 BRPM | WEIGHT: 170.7 LBS

## 2024-02-13 DIAGNOSIS — A46 ERYSIPELAS: Primary | ICD-10-CM

## 2024-02-13 PROCEDURE — 99213 OFFICE O/P EST LOW 20 MIN: CPT | Performed by: INTERNAL MEDICINE

## 2024-02-13 RX ORDER — CEFDINIR 300 MG/1
300 CAPSULE ORAL 2 TIMES DAILY
Qty: 14 CAPSULE | Refills: 0 | Status: SHIPPED | OUTPATIENT
Start: 2024-02-13

## 2024-02-14 ENCOUNTER — TELEPHONE (OUTPATIENT)
Dept: FAMILY MEDICINE CLINIC | Facility: CLINIC | Age: 69
End: 2024-02-14

## 2024-02-14 RX ORDER — AZITHROMYCIN 250 MG/1
TABLET, FILM COATED ORAL
Qty: 6 TABLET | Refills: 0 | Status: SHIPPED | OUTPATIENT
Start: 2024-02-14

## 2024-02-14 NOTE — TELEPHONE ENCOUNTER
Caller: Juli Manzanares    Relationship: Self    Best call back number: [phone]     Which medication are you concerned about:     cefdinir (OMNICEF) 300 MG capsule       Who prescribed you this medication: DR DING        What are your concerns: PATIENT IS CALLING TO STATE SHE HAS AN ALLERGY TO PENICILLIN AND IS WANTING TO KNOW IF IT IS SAFE FOR HER TO TAKE THE ANTIBIOTIC THAT DR IDNG PRESCRIBED FOR HER YESTERDAY.      [facility]/pharmacy #[id number] - [place], [place] - [address] AT [facility] - [phone]  - [phone]  [phone]     PLEASE ADVISE.

## 2024-03-12 RX ORDER — MELOXICAM 7.5 MG/1
TABLET ORAL
Qty: 30 TABLET | Refills: 1 | Status: SHIPPED | OUTPATIENT
Start: 2024-03-12

## 2024-05-08 ENCOUNTER — OFFICE VISIT (OUTPATIENT)
Dept: FAMILY MEDICINE CLINIC | Facility: CLINIC | Age: 69
End: 2024-05-08
Payer: MEDICARE

## 2024-05-08 VITALS
HEART RATE: 71 BPM | HEIGHT: 67 IN | TEMPERATURE: 96.8 F | WEIGHT: 171.8 LBS | OXYGEN SATURATION: 98 % | BODY MASS INDEX: 26.97 KG/M2 | SYSTOLIC BLOOD PRESSURE: 122 MMHG | DIASTOLIC BLOOD PRESSURE: 84 MMHG | RESPIRATION RATE: 16 BRPM

## 2024-05-08 DIAGNOSIS — E78.00 HYPERCHOLESTEROLEMIA: Primary | ICD-10-CM

## 2024-05-08 PROCEDURE — 99213 OFFICE O/P EST LOW 20 MIN: CPT | Performed by: INTERNAL MEDICINE

## 2024-05-08 PROCEDURE — 1125F AMNT PAIN NOTED PAIN PRSNT: CPT | Performed by: INTERNAL MEDICINE

## 2024-05-08 RX ORDER — ONDANSETRON 4 MG/1
4 TABLET, ORALLY DISINTEGRATING ORAL EVERY 6 HOURS PRN
COMMUNITY
Start: 2024-03-26 | End: 2024-05-10

## 2024-05-08 RX ORDER — CHLORHEXIDINE GLUCONATE ORAL RINSE 1.2 MG/ML
SOLUTION DENTAL
COMMUNITY
Start: 2024-05-03

## 2024-05-08 RX ORDER — HYDROCODONE BITARTRATE AND ACETAMINOPHEN 5; 325 MG/1; MG/1
TABLET ORAL SEE ADMIN INSTRUCTIONS
COMMUNITY
Start: 2024-05-02 | End: 2024-05-10

## 2024-05-23 ENCOUNTER — PATIENT MESSAGE (OUTPATIENT)
Dept: FAMILY MEDICINE CLINIC | Facility: CLINIC | Age: 69
End: 2024-05-23
Payer: MEDICARE

## 2024-05-25 NOTE — PLAN OF CARE
Problem: Patient Care Overview (Adult)  Goal: Plan of Care Review  Outcome: Ongoing (interventions implemented as appropriate)  Goal: Adult Individualization and Mutuality  Outcome: Ongoing (interventions implemented as appropriate)  Goal: Discharge Needs Assessment  Outcome: Ongoing (interventions implemented as appropriate)    07/12/17 0647   Discharge Needs Assessment   Concerns To Be Addressed basic needs concerns   Equipment Needed After Discharge none   Discharge Disposition home or self-care   Self-Care   Equipment Currently Used at Home none   Living Environment   Transportation Available car         Problem: GI Endoscopy (Adult)  Goal: Signs and Symptoms of Listed Potential Problems Will be Absent or Manageable (GI Endoscopy)  Outcome: Ongoing (interventions implemented as appropriate)    07/12/17 0647   GI Endoscopy   Problems Assessed (GI Endoscopy) pain   Problems Present (GI Endoscopy) none            Unable to reach Patient after 2 attempts.  Left voice message at phone number given (# [phone] ).  Advised if condition becomes life threatening should seek immediate medical assistance by calling 911 or going to the nearest Emergency Dept for an evaluation.  Reason for Disposition   Second attempt to contact caller AND no contact made. Phone number verified.    Protocols used: No Contact or Duplicate Contact Call-A-

## 2024-06-19 ENCOUNTER — PATIENT MESSAGE (OUTPATIENT)
Dept: FAMILY MEDICINE CLINIC | Facility: CLINIC | Age: 69
End: 2024-06-19
Payer: MEDICARE

## 2024-08-15 ENCOUNTER — LAB (OUTPATIENT)
Dept: FAMILY MEDICINE CLINIC | Facility: CLINIC | Age: 69
End: 2024-08-15
Payer: MEDICARE

## 2024-08-15 DIAGNOSIS — E78.00 HYPERCHOLESTEROLEMIA: Primary | ICD-10-CM

## 2024-08-16 LAB
ALBUMIN SERPL-MCNC: 4.8 G/DL (ref 3.5–5.2)
ALBUMIN/GLOB SERPL: 2.4 G/DL
ALP SERPL-CCNC: 75 U/L (ref 39–117)
ALT SERPL-CCNC: 41 U/L (ref 1–33)
APPEARANCE UR: CLEAR
AST SERPL-CCNC: 51 U/L (ref 1–32)
BACTERIA #/AREA URNS HPF: NORMAL /HPF
BASOPHILS # BLD AUTO: 0.03 10*3/MM3 (ref 0–0.2)
BASOPHILS NFR BLD AUTO: 0.7 % (ref 0–1.5)
BILIRUB SERPL-MCNC: 0.9 MG/DL (ref 0–1.2)
BILIRUB UR QL STRIP: NEGATIVE
BUN SERPL-MCNC: 22 MG/DL (ref 8–23)
BUN/CREAT SERPL: 22.2 (ref 7–25)
CALCIUM SERPL-MCNC: 9.7 MG/DL (ref 8.6–10.5)
CASTS URNS MICRO: NORMAL
CHLORIDE SERPL-SCNC: 98 MMOL/L (ref 98–107)
CHOLEST SERPL-MCNC: 171 MG/DL (ref 0–200)
CO2 SERPL-SCNC: 25.9 MMOL/L (ref 22–29)
COLOR UR: YELLOW
CREAT SERPL-MCNC: 0.99 MG/DL (ref 0.57–1)
EGFRCR SERPLBLD CKD-EPI 2021: 61.1 ML/MIN/1.73
EOSINOPHIL # BLD AUTO: 0.21 10*3/MM3 (ref 0–0.4)
EOSINOPHIL NFR BLD AUTO: 4.9 % (ref 0.3–6.2)
EPI CELLS #/AREA URNS HPF: NORMAL /HPF
ERYTHROCYTE [DISTWIDTH] IN BLOOD BY AUTOMATED COUNT: 12.9 % (ref 12.3–15.4)
GLOBULIN SER CALC-MCNC: 2 GM/DL
GLUCOSE SERPL-MCNC: 90 MG/DL (ref 65–99)
GLUCOSE UR QL STRIP: NEGATIVE
HCT VFR BLD AUTO: 41.3 % (ref 34–46.6)
HDLC SERPL-MCNC: 81 MG/DL (ref 40–60)
HGB BLD-MCNC: 13.2 G/DL (ref 12–15.9)
HGB UR QL STRIP: NEGATIVE
IMM GRANULOCYTES # BLD AUTO: 0.01 10*3/MM3 (ref 0–0.05)
IMM GRANULOCYTES NFR BLD AUTO: 0.2 % (ref 0–0.5)
KETONES UR QL STRIP: NEGATIVE
LDLC SERPL CALC-MCNC: 79 MG/DL (ref 0–100)
LDLC/HDLC SERPL: 0.98 {RATIO}
LEUKOCYTE ESTERASE UR QL STRIP: ABNORMAL
LYMPHOCYTES # BLD AUTO: 1.19 10*3/MM3 (ref 0.7–3.1)
LYMPHOCYTES NFR BLD AUTO: 27.8 % (ref 19.6–45.3)
MCH RBC QN AUTO: 28.1 PG (ref 26.6–33)
MCHC RBC AUTO-ENTMCNC: 32 G/DL (ref 31.5–35.7)
MCV RBC AUTO: 87.9 FL (ref 79–97)
MONOCYTES # BLD AUTO: 0.71 10*3/MM3 (ref 0.1–0.9)
MONOCYTES NFR BLD AUTO: 16.6 % (ref 5–12)
NEUTROPHILS # BLD AUTO: 2.13 10*3/MM3 (ref 1.7–7)
NEUTROPHILS NFR BLD AUTO: 49.8 % (ref 42.7–76)
NITRITE UR QL STRIP: NEGATIVE
NRBC BLD AUTO-RTO: 0 /100 WBC (ref 0–0.2)
PH UR STRIP: 6.5 [PH] (ref 5–8)
PLATELET # BLD AUTO: 182 10*3/MM3 (ref 140–450)
POTASSIUM SERPL-SCNC: 5.4 MMOL/L (ref 3.5–5.2)
PROT SERPL-MCNC: 6.8 G/DL (ref 6–8.5)
PROT UR QL STRIP: NEGATIVE
RBC # BLD AUTO: 4.7 10*6/MM3 (ref 3.77–5.28)
RBC #/AREA URNS HPF: NORMAL /HPF
SODIUM SERPL-SCNC: 135 MMOL/L (ref 136–145)
SP GR UR STRIP: 1.01 (ref 1–1.03)
TRIGL SERPL-MCNC: 52 MG/DL (ref 0–150)
UROBILINOGEN UR STRIP-MCNC: ABNORMAL MG/DL
VLDLC SERPL CALC-MCNC: 11 MG/DL (ref 5–40)
WBC # BLD AUTO: 4.28 10*3/MM3 (ref 3.4–10.8)
WBC #/AREA URNS HPF: NORMAL /HPF

## 2024-08-21 ENCOUNTER — OFFICE VISIT (OUTPATIENT)
Dept: FAMILY MEDICINE CLINIC | Facility: CLINIC | Age: 69
End: 2024-08-21
Payer: MEDICARE

## 2024-08-21 ENCOUNTER — DOCUMENTATION (OUTPATIENT)
Dept: FAMILY MEDICINE CLINIC | Facility: CLINIC | Age: 69
End: 2024-08-21

## 2024-08-21 VITALS
TEMPERATURE: 98 F | WEIGHT: 174.5 LBS | SYSTOLIC BLOOD PRESSURE: 122 MMHG | HEIGHT: 67 IN | BODY MASS INDEX: 27.39 KG/M2 | DIASTOLIC BLOOD PRESSURE: 80 MMHG | HEART RATE: 64 BPM | RESPIRATION RATE: 16 BRPM | OXYGEN SATURATION: 98 %

## 2024-08-21 DIAGNOSIS — E78.00 HYPERCHOLESTEROLEMIA: ICD-10-CM

## 2024-08-21 DIAGNOSIS — M25.561 ACUTE PAIN OF RIGHT KNEE: Primary | ICD-10-CM

## 2024-08-21 DIAGNOSIS — Z00.00 MEDICARE ANNUAL WELLNESS VISIT, SUBSEQUENT: ICD-10-CM

## 2024-08-21 DIAGNOSIS — M41.25 OTHER IDIOPATHIC SCOLIOSIS, THORACOLUMBAR REGION: ICD-10-CM

## 2024-08-21 RX ORDER — CLOBETASOL PROPIONATE 0.5 MG/G
EMULSION TOPICAL
COMMUNITY
Start: 2024-08-19

## 2024-08-21 RX ORDER — TRIAMCINOLONE ACETONIDE 0.25 MG/G
CREAM TOPICAL
COMMUNITY
Start: 2024-05-26

## 2024-08-21 NOTE — PROGRESS NOTES
Subjective   The ABCs of the Annual Wellness Visit  Medicare Wellness Visit      Juli Manzanares is a 69 y.o. patient who presents for a Medicare Wellness Visit.    The following portions of the patient's history were reviewed and   updated as appropriate: allergies, current medications, past family history, past medical history, past social history, past surgical history, and problem list.    Compared to one year ago, the patient's physical   health is worse.  Compared to one year ago, the patient's mental   health is the same.    Recent Hospitalizations:  She was not admitted to the hospital during the last year.     Current Medical Providers:  Patient Care Team:  Hector Keene MD as PCP - General (Internal Medicine)  Lurdes Nielson MD as Consulting Physician (Obstetrics and Gynecology)  Israel Heredia II, MD as Consulting Physician (Hematology and Oncology)  Todd Wong MD as Consulting Physician (Gastroenterology)    Outpatient Medications Prior to Visit   Medication Sig Dispense Refill    BIOTIN PO Take 4,000 mcg by mouth.      calcium citrate-vitamin d (CITRACAL) 200-250 MG-UNIT tablet tablet Take  by mouth Daily.      cetirizine (zyrTEC) 10 MG tablet       Cholecalciferol (VITAMIN D3) 5000 UNITS capsule capsule Take 1 capsule by mouth Daily.      clobetasol (TEMOVATE) 0.05 % cream APPLY TO AFFECTED AREA TWICE A DAY ECZEMA UP TO 2 WEEKS      meloxicam (MOBIC) 7.5 MG tablet TAKE 1 TABLET BY MOUTH EVERY DAY 30 tablet 1    Misc Natural Products (LUTEIN VISION BLEND PO)       Multiple Vitamins-Minerals (MULTI FOR HER 50+ PO) Take  by mouth.      rosuvastatin (Crestor) 10 MG tablet Take 1 tablet by mouth Daily. 90 tablet 3    triamcinolone (KENALOG) 0.025 % cream APPLY TO AFFECTED AREAS OF REDNESS ON FACE ONCE DAILY      Wheat Dextrin (BENEFIBER DRINK MIX PO) Take  by mouth Every Morning.      Clobetasol Propionate E 0.05 % emollient cream  (Patient not taking: Reported on 8/21/2024)    "   chlorhexidine (PERIDEX) 0.12 % solution SWISH AND SPIT 1-2 TEASPOONS FOUR TIMES DAILY AS NEEDED       No facility-administered medications prior to visit.     No opioid medication identified on active medication list. I have reviewed chart for other potential  high risk medication/s and harmful drug interactions in the elderly.      Aspirin is not on active medication list.  Aspirin use is not indicated based on review of current medical condition/s. Risk of harm outweighs potential benefits.  .    Patient Active Problem List   Diagnosis    Female pattern hair loss    Maxillary sinusitis    Medicare annual wellness visit, subsequent    Chronic low back pain    Need for vaccination    Hypercholesterolemia    Leukocytopenia    Acute bronchitis    Rectal bleeding    Other idiopathic scoliosis, thoracolumbar region    Pars defect of lumbar spine    Family history of colon cancer    Erysipelas    Acute pain of right knee     Advance Care Planning Advance Directive is on file.  ACP discussion was held with the patient during this visit. Patient has an advance directive in EMR which is still valid.             Objective   Vitals:    08/21/24 0801   BP: 122/80   BP Location: Right arm   Patient Position: Sitting   Cuff Size: Adult   Pulse: 64   Resp: 16   Temp: 98 °F (36.7 °C)   TempSrc: Oral   SpO2: 98%   Weight: 79.2 kg (174 lb 8 oz)   Height: 170.2 cm (67.01\")       Estimated body mass index is 27.32 kg/m² as calculated from the following:    Height as of this encounter: 170.2 cm (67.01\").    Weight as of this encounter: 79.2 kg (174 lb 8 oz).    BMI is >= 25 and <30. (Overweight) The following options were offered after discussion;: exercise counseling/recommendations and nutrition counseling/recommendations       Does the patient have evidence of cognitive impairment? No  Lab Results   Component Value Date    CHLPL 171 08/15/2024    TRIG 52 08/15/2024    HDL 81 (H) 08/15/2024    LDL 79 08/15/2024    VLDL 11 " 08/15/2024                                                                                                Health  Risk Assessment    Smoking Status:  Social History     Tobacco Use   Smoking Status Never   Smokeless Tobacco Never     Alcohol Consumption:  Social History     Substance and Sexual Activity   Alcohol Use Not Currently    Comment: 2 - 3 drinks yearly       Fall Risk Screen  SHANNANADI Fall Risk Assessment was completed, and patient is at MODERATE risk for falls. Assessment completed on:2024    Depression Screenin/21/2024     8:00 AM   PHQ-2/PHQ-9 Depression Screening   Little Interest or Pleasure in Doing Things 0-->not at all   Feeling Down, Depressed or Hopeless 0-->not at all   PHQ-9: Brief Depression Severity Measure Score 0     Health Habits and Functional and Cognitive Screenin/14/2024    10:38 AM   Functional & Cognitive Status   Do you have difficulty preparing food and eating? No   Do you have difficulty bathing yourself, getting dressed or grooming yourself? No   Do you have difficulty using the toilet? No   Do you have difficulty moving around from place to place? No   Do you have trouble with steps or getting out of a bed or a chair? No   Current Diet Well Balanced Diet   Dental Exam Up to date   Eye Exam Up to date   Exercise (times per week) 4 times per week   Current Exercises Include Cardiovascular Workout;Coaching;Light Weights;Treadmill;Walking;Weightlifting   Do you need help using the phone?  No   Are you deaf or do you have serious difficulty hearing?  No   Do you need help to go to places out of walking distance? No   Do you need help shopping? No   Do you need help preparing meals?  No   Do you need help with housework?  No   Do you need help with laundry? No   Do you need help taking your medications? No   Do you need help managing money? No   Do you ever drive or ride in a car without wearing a seat belt? No   Have you felt unusual stress, anger or loneliness  in the last month? No   Who do you live with? Spouse   If you need help, do you have trouble finding someone available to you? No   Have you been bothered in the last four weeks by sexual problems? No   Do you have difficulty concentrating, remembering or making decisions? No           Age-appropriate Screening Schedule:  Refer to the list below for future screening recommendations based on patient's age, sex and/or medical conditions. Orders for these recommended tests are listed in the plan section. The patient has been provided with a written plan.    Health Maintenance List  Health Maintenance   Topic Date Due    TDAP/TD VACCINES (3 - Td or Tdap) 09/17/2014    COVID-19 Vaccine (4 - 2023-24 season) 09/01/2023    DXA SCAN  01/12/2024    BMI FOLLOWUP  07/26/2024    INFLUENZA VACCINE  08/01/2024    MAMMOGRAM  07/12/2025    LIPID PANEL  08/15/2025    ANNUAL WELLNESS VISIT  08/21/2025    PAP SMEAR  07/12/2026    COLORECTAL CANCER SCREENING  12/01/2033    HEPATITIS C SCREENING  Completed    Pneumococcal Vaccine 65+  Completed    ZOSTER VACCINE  Completed                                                                                                                                                CMS Preventative Services Quick Reference  Risk Factors Identified During Encounter  None Identified    The above risks/problems have been discussed with the patient.  Pertinent information has been shared with the patient in the After Visit Summary.  An After Visit Summary and PPPS were made available to the patient.    Follow Up:   Next Medicare Wellness visit to be scheduled in 1 year.         Additional E&M Note during same encounter follows:  Patient has additional, significant, and separately identifiable condition(s)/problem(s) that require work above and beyond the Medicare Wellness Visit     Chief Complaint  Medicare Wellness-subsequent    Subjective    HPI         The patient presents for a wellness visit.    She has  "been diagnosed with low bone mass, as indicated by her recent bone density test. To address this, she has increased her calcium intake to 1200 mg daily and is supplementing with vitamin D3. She has also joined a fitness center.    She reports worsening scoliosis in her back, which causes discomfort and pain, particularly in the afternoons. Despite previous physical therapy sessions, she finds relief only when lying down. Her mobility is limited, especially in the evenings, due to her back condition.    She has been experiencing mild aching and stiffness in her right knee for the past 6 weeks, despite no history of falls or injuries. She is currently taking meloxicam daily.    She has a history of high cholesterol, previously recorded at 234.    She reports no symptoms of a urinary tract infection.    Her weight has remained stable, with a slight increase from 171 to 174 pounds since her last gynecologist visit. She is mindful of her diet and engages in regular exercise.    She receives annual influenza vaccines and is considering the RSV vaccine.    She feels her physical health has slightly deteriorated compared to the previous year, but her emotional state remains stable. She has not required hospitalization in the past year and does not take aspirin daily. She has an advanced directive in place.    SOCIAL HISTORY  She does not drink alcohol.  Review of Systems   Constitutional: Negative.    HENT: Negative.     Respiratory: Negative.     Cardiovascular: Negative.    Musculoskeletal:         She complains of right knee pain for the last 6 weeks   Psychiatric/Behavioral: Negative.            Objective   Vital Signs:  /80 (BP Location: Right arm, Patient Position: Sitting, Cuff Size: Adult)   Pulse 64   Temp 98 °F (36.7 °C) (Oral)   Resp 16   Ht 170.2 cm (67.01\")   Wt 79.2 kg (174 lb 8 oz)   SpO2 98%   BMI 27.32 kg/m²   Physical Exam  Vitals and nursing note reviewed.   Cardiovascular:      Rate and " Rhythm: Regular rhythm.      Heart sounds: Normal heart sounds. No murmur heard.     No gallop.   Pulmonary:      Effort: No respiratory distress.      Breath sounds: Normal breath sounds. No wheezing or rales.   Musculoskeletal:      Comments: Right knee without crepitus   Psychiatric:         Mood and Affect: Mood normal.         Behavior: Behavior normal.         Thought Content: Thought content normal.           Vital Signs  BMI is 27.             Assessment and Plan           1. Low Bone Mass.  She has increased her calcium intake to 1200 mg daily. A daily regimen of 1200 mg of calcium and 1000 IUs of vitamin D was recommended. She was advised that the brand of supplements does not matter as long as the dosage is correct.    2. Scoliosis.  She reports worsening symptoms, particularly in the afternoons, causing discomfort and difficulty standing straight. She was advised that physical therapy and core strengthening exercises might help alleviate symptoms. She was also advised to continue her current exercise regimen at the gym.    3. Right Knee Pain.  She reports right knee pain and stiffness for the past 6 weeks. An x-ray of the right knee was ordered to rule out arthritis or other issues. She was advised to continue taking meloxicam as prescribed for inflammation and pain relief. If the pain persists or worsens, a referral to an orthopedic surgeon for further evaluation and possible injection therapy will be considered.    4. Slightly Elevated Liver Enzymes.  Her comprehensive metabolic panel showed a slight elevation in liver enzymes. She was reassured that this is likely not significant but will be rechecked at a future visit.    5. Health Maintenance.  She is due for a tetanus vaccine and was advised to get it at the pharmacy. She was also advised to consider the RSV vaccine, especially since it is recommended for people over 60. She generally gets a flu shot annually at [facility] and was reminded to continue  this practice.      No orders of the defined types were placed in this encounter.            Follow Up   No follow-ups on file.  Patient was given instructions and counseling regarding her condition or for health maintenance advice. Please see specific information pulled into the AVS if appropriate.  Patient or patient representative verbalized consent for the use of Ambient Listening during the visit with  Hector Keene MD for chart documentation. 8/21/2024  08:53 EDT

## 2024-08-23 ENCOUNTER — HOSPITAL ENCOUNTER (OUTPATIENT)
Dept: GENERAL RADIOLOGY | Facility: HOSPITAL | Age: 69
Discharge: HOME | End: 2024-08-23
Payer: MEDICARE

## 2024-08-23 DIAGNOSIS — M25.561 RIGHT KNEE PAIN, UNSPECIFIED CHRONICITY: ICD-10-CM

## 2024-08-23 DIAGNOSIS — M25.561 RIGHT KNEE PAIN, UNSPECIFIED CHRONICITY: Primary | ICD-10-CM

## 2024-08-23 PROCEDURE — 73560 X-RAY EXAM OF KNEE 1 OR 2: CPT

## 2024-08-30 DIAGNOSIS — M17.11 PRIMARY OSTEOARTHRITIS OF RIGHT KNEE: Primary | ICD-10-CM

## 2024-09-09 ENCOUNTER — OFFICE VISIT (OUTPATIENT)
Dept: ORTHOPEDIC SURGERY | Facility: CLINIC | Age: 69
End: 2024-09-09
Payer: MEDICARE

## 2024-09-09 VITALS — TEMPERATURE: 97.5 F | BODY MASS INDEX: 27.7 KG/M2 | HEIGHT: 67 IN | WEIGHT: 176.5 LBS

## 2024-09-09 DIAGNOSIS — M17.11 PRIMARY OSTEOARTHRITIS OF RIGHT KNEE: Primary | ICD-10-CM

## 2024-09-09 PROCEDURE — 1159F MED LIST DOCD IN RCRD: CPT | Performed by: ORTHOPAEDIC SURGERY

## 2024-09-09 PROCEDURE — 1160F RVW MEDS BY RX/DR IN RCRD: CPT | Performed by: ORTHOPAEDIC SURGERY

## 2024-09-09 PROCEDURE — 99203 OFFICE O/P NEW LOW 30 MIN: CPT | Performed by: ORTHOPAEDIC SURGERY

## 2024-09-09 NOTE — PROGRESS NOTES
Patient ID: Juli Manzanares     Chief Complaint:    Chief Complaint   Patient presents with    Right Knee - Establish Care, Pain, Initial Evaluation        HPI:    Juli Manzanares is a 69 y.o. who presents today for evaluation of right knee pain.  States she has had issues on and off for some time particularly more noticeable after she started doing some exercises at her gym with a  particularly lunges and deep squatting.  Also with prolonged sitting has some stiffness and achiness but once get up and moving feels better.  Most of her discomfort is laterally based.    Social History     Socioeconomic History    Marital status:      Spouse name: Naga    Years of education: College   Tobacco Use    Smoking status: Never    Smokeless tobacco: Never   Vaping Use    Vaping status: Never Used   Substance and Sexual Activity    Alcohol use: Not Currently     Comment: 2 - 3 drinks yearly    Drug use: Never    Sexual activity: Not Currently     Birth control/protection: Post-menopausal     Past Medical History:   Diagnosis Date    Allergic 1990s    Penicillin, Bactrim, and Carbocaine    Arthritis     Asthma     H/O colonoscopy     Hemorrhoids     Low back pain Nov. 2021    Pain & discomfort gradually worsened & it is difficult to stand up straight.    Pneumonia Sept 2002    Scoliosis 1990    Discomfort gradually worsened through the years.     Family History   Problem Relation Age of Onset    Cancer Mother         Lung cancer in 2005    Lung cancer Mother     Hypertension Father     Diabetes Father     Kidney disease Father         My father had prostate cancer in the late 1980s    Prostate cancer Father     Cancer Father         Colon cancer in 2021    Vision loss Father         Had glaucoma    Cancer Cousin        ROS:    ROS:  Constitutional:  Denies fever, shaking or chills         All other pertinent positives and negatives as noted above in HPI.    Physical Exam:     Vital Signs:  Temp 97.5 °F  "(36.4 °C) (Temporal)   Ht 170.2 cm (67.01\")   Wt 80.1 kg (176 lb 8 oz)   BMI 27.64 kg/m²   Constitutional: Awake alert and oriented x3, well developed, well nourished, no acute distress, non-toxic appearance.      Musculoskeletal:    Exam of the right  knee:  Painful gait with a subtle limp  No muscle atrophy, erythema, ecchymosis, or gross deformity noted  mild knee effusion    Active range of motion 0-120  5/5 strength flexion and extension  The knee is stable to varus and valgus stress testing  Mild valgus alignment of the limb  Lachman negative  Posterior drawer negative  Tania's negative  Patellofemoral grind +  Sensation grossly intact to light tough throughout the lower extremity  Skin is intact  Distal pulses are 2+  No signs or symptoms of DVT          Diagnostic Studies:     Imaging was personally and individually reviewed and discussed at length with the patient:    2 views right knee PA flexion and sunrise taken reviewed imaging shows degenerative changes in all 3 compartments with the lateral femoral compartment most affected is bone-on-bone evidence of joint space loss, bone sclerosis and osteophyte formation this is consistent with previous films.    AP pelvis was taken in the office today: AP pelvis shows some moderate to severe degenerative changes about the left hip joint and moderate degenerative changes right hip joint with evidence of joint space loss and early bone sclerosis.            Assessment:     right  Knee Osteoarthritis            Plan:     Based on x-rays, history, and office evaluation, we have diagnosed Juli Manzanares with knee arthritis. At this time, we recommend starting with a conservative treatment program. This will consist of cortisone injection during significant flare-ups, NSAIDS for daily maintenance, physical therapy for strengthening and modalities as indicated, and bracing when appropriate. These measures will continue, until symptoms are no longer relieved, " become more severe or function begins to significantly deteriorate. At that point joint replacement options will be discussed.    Patient to try medial heel wedge as well as formal physical therapy.  And conservative treatments as noted above.    Follow up in 6 months unless symptoms return or a new issue occurs.  Patient will call the office to schedule an appointment.     All questions were answered, the patient understands and agrees with the plan.

## 2024-09-11 ENCOUNTER — PATIENT ROUNDING (BHMG ONLY) (OUTPATIENT)
Dept: ORTHOPEDIC SURGERY | Facility: CLINIC | Age: 69
End: 2024-09-11
Payer: MEDICARE

## 2024-09-13 RX ORDER — MELOXICAM 7.5 MG/1
TABLET ORAL
Qty: 30 TABLET | Refills: 1 | Status: SHIPPED | OUTPATIENT
Start: 2024-09-13

## 2024-10-26 NOTE — PROGRESS NOTES
A Donews Message has been sent to the patient for PATIENT ROUNDING with [facility] [place]   
No antibiotics or any antibiotics < 2 hrs prior to birth

## 2024-11-11 RX ORDER — MELOXICAM 7.5 MG/1
TABLET ORAL
Qty: 30 TABLET | Refills: 3 | Status: SHIPPED | OUTPATIENT
Start: 2024-11-11

## 2024-11-15 ENCOUNTER — OFFICE VISIT (OUTPATIENT)
Dept: ORTHOPEDIC SURGERY | Facility: CLINIC | Age: 69
End: 2024-11-15
Payer: MEDICARE

## 2024-11-15 VITALS — WEIGHT: 174.4 LBS | TEMPERATURE: 97.5 F | BODY MASS INDEX: 27.37 KG/M2 | HEIGHT: 67 IN

## 2024-11-15 DIAGNOSIS — M17.11 PRIMARY OSTEOARTHRITIS OF RIGHT KNEE: Primary | ICD-10-CM

## 2024-11-15 PROCEDURE — 1160F RVW MEDS BY RX/DR IN RCRD: CPT | Performed by: ORTHOPAEDIC SURGERY

## 2024-11-15 PROCEDURE — 99214 OFFICE O/P EST MOD 30 MIN: CPT | Performed by: ORTHOPAEDIC SURGERY

## 2024-11-15 PROCEDURE — 1159F MED LIST DOCD IN RCRD: CPT | Performed by: ORTHOPAEDIC SURGERY

## 2024-11-15 RX ORDER — CHLORHEXIDINE GLUCONATE ORAL RINSE 1.2 MG/ML
SOLUTION DENTAL
COMMUNITY
Start: 2024-11-06

## 2024-11-20 ENCOUNTER — TELEPHONE (OUTPATIENT)
Dept: ORTHOPEDIC SURGERY | Facility: CLINIC | Age: 69
End: 2024-11-20
Payer: MEDICARE

## 2024-11-20 ENCOUNTER — PREP FOR SURGERY (OUTPATIENT)
Dept: OTHER | Facility: HOSPITAL | Age: 69
End: 2024-11-20
Payer: MEDICARE

## 2024-11-20 DIAGNOSIS — M17.11 PRIMARY OSTEOARTHRITIS OF RIGHT KNEE: Primary | ICD-10-CM

## 2024-11-20 RX ORDER — ACETAMINOPHEN 10 MG/ML
1000 INJECTION, SOLUTION INTRAVENOUS ONCE
OUTPATIENT
Start: 2024-11-20 | End: 2024-11-20

## 2024-11-20 RX ORDER — TRANEXAMIC ACID 10 MG/ML
1000 INJECTION, SOLUTION INTRAVENOUS ONCE
OUTPATIENT
Start: 2024-11-20 | End: 2024-11-20

## 2024-11-20 RX ORDER — PREGABALIN 75 MG/1
75 CAPSULE ORAL ONCE
OUTPATIENT
Start: 2024-11-20 | End: 2024-11-20

## 2024-11-20 RX ORDER — MELOXICAM 15 MG/1
7.5 TABLET ORAL ONCE
OUTPATIENT
Start: 2024-11-20 | End: 2024-11-20

## 2024-11-20 NOTE — TELEPHONE ENCOUNTER
Patient called and decided she wants to proceed with surgery.  She did see her oral surgeon yesterday who has completed all the bone grafting and screw placement for her future dental implant.  Patient states that she discussed with the oral surgeon about having her joint replacement done and he said he could wait as far out as September of next year before completing the implant.  Patient would like to do her surgery sometime in February or March.  Message forwarded to Dr. Barbosa to enter a case request

## 2025-01-20 RX ORDER — ROSUVASTATIN CALCIUM 10 MG/1
10 TABLET, COATED ORAL DAILY
Qty: 90 TABLET | Refills: 0 | Status: SHIPPED | OUTPATIENT
Start: 2025-01-20

## 2025-01-24 ENCOUNTER — TELEPHONE (OUTPATIENT)
Dept: FAMILY MEDICINE CLINIC | Facility: CLINIC | Age: 70
End: 2025-01-24

## 2025-01-24 NOTE — TELEPHONE ENCOUNTER
Hub staff attempted to follow warm transfer process and was unsuccessful     Caller: Juli Manzanares    Relationship to patient: Self    Best call back number: [phone]     Patient is needing: PATIENT REQUESTS CALL BACK TO SCHEDULE LABS PRIOR TO HER ANNUAL WELLNESS VISIT IN AUGUST

## 2025-01-30 ENCOUNTER — TELEPHONE (OUTPATIENT)
Dept: ORTHOPEDIC SURGERY | Facility: CLINIC | Age: 70
End: 2025-01-30
Payer: MEDICARE

## 2025-01-30 NOTE — TELEPHONE ENCOUNTER
Patient called to inquire whether or not she could have dental procedures.  Apparently the dentist is recommending periodontal scaling and deep cleaning.  Patient states that she does see the dentist on a regular basis and is having no trouble.  Have advised her that Dr. Zapata would prefer that she not have that type of procedure at least 6 weeks prior to surgery.  If she were to have it done ASAP it would still only be about 4 weeks.  Would recommend that she hold off on any periodontal cleaning until after her surgery.  Patient does understand that she will have to wait a full 12 weeks after surgery before rescheduling the dental work.  And also understands that she will need to be premedicated for the dental work after her surgery

## 2025-02-05 DIAGNOSIS — E78.00 HYPERCHOLESTEROLEMIA: ICD-10-CM

## 2025-02-05 DIAGNOSIS — D72.819 LEUKOPENIA, UNSPECIFIED TYPE: ICD-10-CM

## 2025-02-05 DIAGNOSIS — M41.25 OTHER IDIOPATHIC SCOLIOSIS, THORACOLUMBAR REGION: ICD-10-CM

## 2025-02-05 DIAGNOSIS — M17.11 PRIMARY OSTEOARTHRITIS OF RIGHT KNEE: Primary | ICD-10-CM

## 2025-02-06 LAB
ALBUMIN SERPL-MCNC: 4.7 G/DL (ref 3.8–4.8)
ALP SERPL-CCNC: 74 IU/L (ref 44–121)
ALT SERPL-CCNC: 23 IU/L (ref 0–32)
APPEARANCE UR: CLEAR
AST SERPL-CCNC: 35 IU/L (ref 0–40)
BASOPHILS # BLD AUTO: 0 X10E3/UL (ref 0–0.2)
BASOPHILS NFR BLD AUTO: 0 %
BILIRUB SERPL-MCNC: 0.7 MG/DL (ref 0–1.2)
BILIRUB UR QL STRIP: NEGATIVE
BUN SERPL-MCNC: 23 MG/DL (ref 8–27)
BUN/CREAT SERPL: 25 (ref 12–28)
CALCIUM SERPL-MCNC: 9.6 MG/DL (ref 8.7–10.3)
CHLORIDE SERPL-SCNC: 98 MMOL/L (ref 96–106)
CHOLEST SERPL-MCNC: 159 MG/DL (ref 100–199)
CO2 SERPL-SCNC: 23 MMOL/L (ref 20–29)
COLOR UR: YELLOW
CREAT SERPL-MCNC: 0.93 MG/DL (ref 0.57–1)
EGFRCR SERPLBLD CKD-EPI 2021: 66 ML/MIN/1.73
EOSINOPHIL # BLD AUTO: 0.2 X10E3/UL (ref 0–0.4)
EOSINOPHIL NFR BLD AUTO: 3 %
ERYTHROCYTE [DISTWIDTH] IN BLOOD BY AUTOMATED COUNT: 12.9 % (ref 11.7–15.4)
GLOBULIN SER CALC-MCNC: 1.9 G/DL (ref 1.5–4.5)
GLUCOSE SERPL-MCNC: 89 MG/DL (ref 70–99)
GLUCOSE UR QL STRIP: NEGATIVE
HCT VFR BLD AUTO: 41.2 % (ref 34–46.6)
HDLC SERPL-MCNC: 76 MG/DL
HGB BLD-MCNC: 13.3 G/DL (ref 11.1–15.9)
HGB UR QL STRIP: NEGATIVE
IMM GRANULOCYTES # BLD AUTO: 0 X10E3/UL (ref 0–0.1)
IMM GRANULOCYTES NFR BLD AUTO: 0 %
KETONES UR QL STRIP: NEGATIVE
LDLC SERPL CALC-MCNC: 73 MG/DL (ref 0–99)
LDLC/HDLC SERPL: 1 RATIO (ref 0–3.2)
LEUKOCYTE ESTERASE UR QL STRIP: NEGATIVE
LYMPHOCYTES # BLD AUTO: 1.6 X10E3/UL (ref 0.7–3.1)
LYMPHOCYTES NFR BLD AUTO: 31 %
MCH RBC QN AUTO: 28.3 PG (ref 26.6–33)
MCHC RBC AUTO-ENTMCNC: 32.3 G/DL (ref 31.5–35.7)
MCV RBC AUTO: 88 FL (ref 79–97)
MICRO URNS: NORMAL
MONOCYTES # BLD AUTO: 0.8 X10E3/UL (ref 0.1–0.9)
MONOCYTES NFR BLD AUTO: 16 %
NEUTROPHILS # BLD AUTO: 2.5 X10E3/UL (ref 1.4–7)
NEUTROPHILS NFR BLD AUTO: 50 %
NITRITE UR QL STRIP: NEGATIVE
PH UR STRIP: 6.5 [PH] (ref 5–7.5)
PLATELET # BLD AUTO: 201 X10E3/UL (ref 150–450)
POTASSIUM SERPL-SCNC: 5 MMOL/L (ref 3.5–5.2)
PROT SERPL-MCNC: 6.6 G/DL (ref 6–8.5)
PROT UR QL STRIP: NEGATIVE
RBC # BLD AUTO: 4.7 X10E6/UL (ref 3.77–5.28)
SODIUM SERPL-SCNC: 136 MMOL/L (ref 134–144)
SP GR UR STRIP: 1.01 (ref 1–1.03)
TRIGL SERPL-MCNC: 49 MG/DL (ref 0–149)
UROBILINOGEN UR STRIP-MCNC: 0.2 MG/DL (ref 0.2–1)
VLDLC SERPL CALC-MCNC: 10 MG/DL (ref 5–40)
WBC # BLD AUTO: 5 X10E3/UL (ref 3.4–10.8)

## 2025-02-14 ENCOUNTER — PRE-ADMISSION TESTING (OUTPATIENT)
Dept: PREADMISSION TESTING | Facility: HOSPITAL | Age: 70
End: 2025-02-14
Payer: MEDICARE

## 2025-02-14 VITALS
TEMPERATURE: 98.3 F | HEART RATE: 71 BPM | BODY MASS INDEX: 27.11 KG/M2 | DIASTOLIC BLOOD PRESSURE: 89 MMHG | OXYGEN SATURATION: 100 % | HEIGHT: 67 IN | SYSTOLIC BLOOD PRESSURE: 161 MMHG | WEIGHT: 172.7 LBS | RESPIRATION RATE: 16 BRPM

## 2025-02-14 LAB
ABO GROUP BLD: NORMAL
BLD GP AB SCN SERPL QL: NEGATIVE
HBA1C MFR BLD: 5.3 % (ref 4.8–5.6)
RH BLD: NEGATIVE
T&S EXPIRATION DATE: NORMAL

## 2025-02-14 PROCEDURE — 93010 ELECTROCARDIOGRAM REPORT: CPT | Performed by: INTERNAL MEDICINE

## 2025-02-14 PROCEDURE — 93005 ELECTROCARDIOGRAM TRACING: CPT

## 2025-02-14 PROCEDURE — 86901 BLOOD TYPING SEROLOGIC RH(D): CPT | Performed by: ORTHOPAEDIC SURGERY

## 2025-02-14 PROCEDURE — 86900 BLOOD TYPING SEROLOGIC ABO: CPT | Performed by: ORTHOPAEDIC SURGERY

## 2025-02-14 PROCEDURE — 86850 RBC ANTIBODY SCREEN: CPT | Performed by: ORTHOPAEDIC SURGERY

## 2025-02-14 PROCEDURE — 83036 HEMOGLOBIN GLYCOSYLATED A1C: CPT | Performed by: ORTHOPAEDIC SURGERY

## 2025-02-14 RX ORDER — CHOLECALCIFEROL (VITAMIN D3) 25 MCG
1000 TABLET ORAL DAILY
COMMUNITY

## 2025-02-14 NOTE — DISCHARGE INSTRUCTIONS
Take the following medications the morning of surgery:  NONE    (STOP MULTIVITAMINS/BIOTIN NOW UNTIL AFTER SURGERY, STOP MELOXICAM 7 DAYS PRIOR TO SURGERY)      If you are on prescription narcotic pain medication to control your pain you may also take that medication the morning of surgery.      General Instructions:     Do not eat solid food after midnight the night before surgery.  Clear liquids day of surgery are allowed but must be stopped at least two hours before your hospital arrival time.       Allowed clear liquids      Water, sodas, and tea or coffee with no cream or milk added.       12 to 20 ounces of a clear liquid that contains carbohydrates is recommended.  If non-diabetic, have Gatorade or Powerade.  If diabetic, have G2 or Powerade Zero.     Do not have liquids red in color.  Do not consume chicken, beef, pork or vegetable broth or bouillon cubes of any variety as they are not considered clear liquids and are not allowed.      Infants may have breast milk up to four hours before surgery.  Infants drinking formula may drink formula up to six hours before surgery.   Patients who avoid smoking, chewing tobacco and alcohol for 4 weeks prior to surgery have a reduced risk of post-operative complications.  Quit smoking as many days before surgery as you can.  Do not smoke, use chewing tobacco or drink alcohol the day of surgery.   If applicable bring your C-PAP/ BI-PAP machine in with you to preop day of surgery.  Bring any papers given to you in the doctor’s office.  Wear clean comfortable clothes.  Do not wear contact lenses, false eyelashes or make-up.  Bring a case for your glasses.   Bring crutches or walker if applicable.  Remove all piercings.  Leave jewelry and any other valuables at home.  Hair extensions with metal clips must be removed prior to surgery.  The Pre-Admission Testing nurse will instruct you to bring medications if unable to obtain an accurate list in Pre-Admission Testing.         If you were given a blood bank ID arm band remember to bring it with you the day of surgery.    Preventing a Surgical Site Infection:  For 2 to 3 days before surgery, avoid shaving with a razor because the razor can irritate skin and make it easier to develop an infection.    Any areas of open skin can increase the risk of a post-operative wound infection by allowing bacteria to enter and travel throughout the body.  Notify your surgeon if you have any skin wounds / rashes even if it is not near the expected surgical site.  The area will need assessed to determine if surgery should be delayed until it is healed.  The night prior to surgery shower using a fresh bar of anti-bacterial soap (such as Dial) and clean washcloth.  Sleep in a clean bed with clean clothing.  Do not allow pets to sleep with you.  Shower on the morning of surgery using a fresh bar of anti-bacterial soap (such as Dial) and clean washcloth.  Dry with a clean towel and dress in clean clothing.  Ask your surgeon if you will be receiving antibiotics prior to surgery.  Make sure you, your family, and all healthcare providers clean their hands with soap and water or an alcohol based hand  before caring for you or your wound.    Day of surgery:  Your arrival time is approximately two hours before your scheduled surgery time.  Please note if you have an early arrival time the surgery doors do not open before 5:00 AM.  Upon arrival, a Pre-op nurse and Anesthesiologist will review your health history, obtain vital signs, and answer questions you may have.  The only belongings needed at this time will be a list of your home medications and if applicable your C-PAP/BI-PAP machine.  A Pre-op nurse will start an IV and you may receive medication in preparation for surgery, including something to help you relax.     Please be aware that surgery does come with discomfort.  We want to make every effort to control your discomfort so please discuss  any uncontrolled symptoms with your nurse.   Your doctor will most likely have prescribed pain medications.      If you are going home after surgery you will receive individualized written care instructions before being discharged.  A responsible adult must drive you to and from the hospital on the day of your surgery and ideally stay with you through the night.   .  Discharge prescriptions can be filled by the hospital pharmacy during regular pharmacy hours.  If you are having surgery late in the day/evening your prescription may be e-prescribed to your pharmacy.  Please verify your pharmacy hours or chose a 24 hour pharmacy to avoid not having access to your prescription because your pharmacy has closed for the day.    If you are staying overnight following surgery, you will be transported to your hospital room following the recovery period.  [facility] has all private rooms.    If you have any questions please call Pre-Admission Testing at [phone].  Deductibles and co-payments are collected on the day of service. Please be prepared to pay the required co-pay, deductible or deposit on the day of service as defined by your plan.    Call your surgeon immediately if you experience any of the following symptoms:  Sore Throat  Shortness of Breath or difficulty breathing  Cough  Chills  Body soreness or muscle pain  Headache  Fever  New loss of taste or smell  Do not arrive for your surgery ill.  Your procedure will need to be rescheduled to another time.  You will need to call your physician before the day of surgery to avoid any unnecessary exposure to hospital staff as well as other patients.          CHLORHEXIDINE CLOTH INSTRUCTIONS  The morning of surgery follow these instructions using the Chlorhexidine cloths you've been given.  These steps reduce bacteria on the body.  Do not use the cloths near your eyes, ears mouth, genitalia or on open wounds.  Throw the cloths away after use but do not  try to flush them down a toilet.      Open and remove one cloth at a time from the package.    Leave the cloth unfolded and begin the bathing.  Massage the skin with the cloths using gentle pressure to remove bacteria.  Do not scrub harshly.   Follow the steps below with one 2% CHG cloth per area (6 total cloths).  One cloth for neck, shoulders and chest.  One cloth for both arms, hands, fingers and underarms (do underarms last).  One cloth for the abdomen followed by groin.  One cloth for right leg and foot including between the toes.  One cloth for left leg and foot including between the toes.  The last cloth is to be used for the back of the neck, back and buttocks.    Allow the CHG to air dry 3 minutes on the skin which will give it time to work and decrease the chance of irritation.  The skin may feel sticky until it is dry.  Do not rinse with water or any other liquid or you will lose the beneficial effects of the CHG.  If mild skin irritation occurs, do rinse the skin to remove the CHG.  Report this to the nurse at time of admission.  Do not apply lotions, creams, ointments, deodorants or perfumes after using the clothes. Dress in clean clothes before coming to the hospital.

## 2025-02-16 LAB
QT INTERVAL: 443 MS
QTC INTERVAL: 485 MS

## 2025-02-17 ENCOUNTER — OFFICE VISIT (OUTPATIENT)
Dept: FAMILY MEDICINE CLINIC | Facility: CLINIC | Age: 70
End: 2025-02-17
Payer: MEDICARE

## 2025-02-17 VITALS
WEIGHT: 173.2 LBS | OXYGEN SATURATION: 99 % | SYSTOLIC BLOOD PRESSURE: 122 MMHG | DIASTOLIC BLOOD PRESSURE: 76 MMHG | TEMPERATURE: 96.8 F | HEIGHT: 67 IN | BODY MASS INDEX: 27.18 KG/M2 | RESPIRATION RATE: 16 BRPM | HEART RATE: 89 BPM

## 2025-02-17 DIAGNOSIS — E78.00 HYPERCHOLESTEROLEMIA: Primary | ICD-10-CM

## 2025-02-17 DIAGNOSIS — M17.11 PRIMARY OSTEOARTHRITIS OF RIGHT KNEE: ICD-10-CM

## 2025-02-17 PROCEDURE — 99213 OFFICE O/P EST LOW 20 MIN: CPT | Performed by: INTERNAL MEDICINE

## 2025-02-17 PROCEDURE — 1125F AMNT PAIN NOTED PAIN PRSNT: CPT | Performed by: INTERNAL MEDICINE

## 2025-02-18 NOTE — PROGRESS NOTES
Subjective   Juli Manzanares is a 69 y.o. female. Patient is here today for   Chief Complaint   Patient presents with    Hyperlipidemia        History of Present Illness  She is here to follow-up on labs done last week.    She is feeling pretty well overall.  However, she does have a right total knee scheduled for replacement on February 26.  Hyperlipidemia  Pertinent negatives include no chest pain or myalgias.     Routine 6-month checkup / review lab work;  also I am scheduled for a total knee replacement  in right knee on February 26, 2025 at [facility] by Dr. Demarcus Barbosa.  Symptoms are: chronic.   Onset was 6 to 12 months.   Symptoms occur: intermittently.  Symptoms include: joint pain.   Pertinent negative symptoms include no abdominal pain, no anorexia, no change in stool, no chest pain, no chills, no congestion, no cough, no diaphoresis, no fatigue, no fever, no headaches, no joint swelling, no myalgias, no nausea, no neck pain, no numbness, no rash, no sore throat, no swollen glands, no dysuria, no vertigo, no visual change, no vomiting and no weakness.   Treatment and/or Medications comments include: PT & exercise for knee stiffness & pain in right knee.    Continued discomfort in lower back due to scoliosis condition.  I take Meloxicam for pain & do back exercises.     History of Present Illness        Vitals:    02/17/25 1428   BP: 122/76   Pulse: 89   Resp: 16   Temp: 96.8 °F (36 °C)   SpO2: 99%     Body mass index is 27.12 kg/m².    Past Medical History:   Diagnosis Date    Allergic 1990s    Penicillin, Bactrim, and Carbocaine    Arthritis     Asthma     LAST ATTACK AGE 20'S    DDD (degenerative disc disease), lumbar     Diverticulosis     H/O colonoscopy     Hemorrhoids     History of COVID-19     History of dermatitis     Hyperlipidemia     Knee pain     Low back pain Nov. 2021    Pain & discomfort gradually worsened & it is difficult to stand up straight.    Pneumonia Sept 2002     "Scoliosis 1990    Discomfort gradually worsened through the years.    Spinal stenosis     Toenail fungus     ADVISED PT TO NOTIFY SURGEON PRIOR TO SURGERY      Allergies   Allergen Reactions    Bactrim [Sulfamethoxazole-Trimethoprim] Itching and Rash    Carbocaine [Mepivacaine Hcl] Unknown - Low Severity     \"PASSED OUT\" (OK WITH LIDOCAINE)    Novocain [Procaine] Unknown - Low Severity     \"PASSED OUT/(\"OK WITH LIDOCAINE)    Penicillins Unknown - Low Severity     \"CHILDHOOD REACTION\"    Clindamycin Rash     Rash and swelling on face    Latex Rash      Social History     Socioeconomic History    Marital status:      Spouse name: Naga    Years of education: College   Tobacco Use    Smoking status: Never     Passive exposure: Never    Smokeless tobacco: Never   Vaping Use    Vaping status: Never Used   Substance and Sexual Activity    Alcohol use: Not Currently     Comment: 2 - 3 drinks yearly    Drug use: Never    Sexual activity: Not Currently     Birth control/protection: Post-menopausal        Current Outpatient Medications:     BIOTIN PO, Take 10,000 mcg by mouth Daily. PT HOLDING FOR SURGERY, Disp: , Rfl:     Calcium-Magnesium-Vitamin D (CALCIUM 1200+D3 PO), Take 2 tablets by mouth Daily., Disp: , Rfl:     Cholecalciferol 25 MCG (1000 UT) tablet, Take 1 tablet by mouth Daily., Disp: , Rfl:     clobetasol (TEMOVATE) 0.05 % cream, Apply 1 Application topically to the appropriate area as directed As Needed., Disp: , Rfl:     meloxicam (MOBIC) 7.5 MG tablet, TAKE 1 TABLET BY MOUTH EVERY DAY (Patient taking differently: Take 1 tablet by mouth Daily. TO HOLD 1 WEEK BEFORE SURGERY), Disp: 30 tablet, Rfl: 3    Multiple Vitamins-Minerals (MULTI FOR HER 50+ PO), Take 1 tablet by mouth Daily. PT HOLDING FOR SURGERY, Disp: , Rfl:     rosuvastatin (CRESTOR) 10 MG tablet, TAKE 1 TABLET BY MOUTH EVERY DAY, Disp: 90 tablet, Rfl: 0    Wheat Dextrin (BENEFIBER DRINK MIX PO), Take 1 teaspoon(s) by mouth Every Morning., " Disp: , Rfl:      Objective     Review of Systems   Constitutional:  Negative for chills, diaphoresis, fatigue and fever.   HENT:  Negative for congestion and sore throat.    Respiratory:  Negative for cough.    Cardiovascular:  Negative for chest pain.   Gastrointestinal:  Negative for abdominal pain, anorexia, nausea and vomiting.   Genitourinary:  Negative for dysuria.   Musculoskeletal:  Positive for joint pain. Negative for myalgias and neck pain.   Skin:  Negative for rash.   Neurological:  Negative for vertigo, weakness, numbness and headaches.       Physical Exam  Vitals and nursing note reviewed.   Constitutional:       Appearance: Normal appearance.      Comments: Pleasant, neatly groomed, no distress.  BMI 27.   Cardiovascular:      Rate and Rhythm: Regular rhythm.      Heart sounds: Normal heart sounds. No murmur heard.     No gallop.   Pulmonary:      Effort: No respiratory distress.      Breath sounds: Normal breath sounds. No wheezing or rales.   Neurological:      Mental Status: She is alert and oriented to person, place, and time.   Psychiatric:         Mood and Affect: Mood normal.         Thought Content: Thought content normal.       Physical Exam      Results      Assessment & Plan    Problems Addressed this Visit          Cardiac and Vasculature    Hypercholesterolemia - Primary       Musculoskeletal and Injuries    Primary osteoarthritis of right knee     Diagnoses         Codes Comments    Hypercholesterolemia    -  Primary ICD-10-CM: E78.00  ICD-9-CM: 272.0     Primary osteoarthritis of right knee     ICD-10-CM: M17.11  ICD-9-CM: 715.16           Assessment & Plan    Her hypercholesterolemia is well-controlled.  Her LDL cholesterol is 73 and her HDL cholesterol is 76.    She has primary osteoarthritis of the right knee.  She is going be having a right total knee arthroplasty on February 26 with Dr. Reyes    I asked her to follow-up with me in about 6 months for Medicare annual wellness  visit.  Fasting labs prior to that visit should include: Lipid profile, comprehensive metabolic panel, CBC, urinalysis,    No follow-ups on file.    Patient or patient representative verbalized consent for the use of Ambient Listening during the visit with  Hector Keene MD for chart documentation. 2/18/2025  13:28 EST

## 2025-02-20 ENCOUNTER — TELEPHONE (OUTPATIENT)
Dept: ORTHOPEDIC SURGERY | Facility: HOSPITAL | Age: 70
End: 2025-02-20
Payer: MEDICARE

## 2025-02-20 ENCOUNTER — OFFICE VISIT (OUTPATIENT)
Dept: ORTHOPEDIC SURGERY | Facility: CLINIC | Age: 70
End: 2025-02-20
Payer: MEDICARE

## 2025-02-20 VITALS — TEMPERATURE: 97.8 F | HEIGHT: 67 IN | BODY MASS INDEX: 26.73 KG/M2 | WEIGHT: 170.3 LBS

## 2025-02-20 DIAGNOSIS — R52 PAIN: Primary | ICD-10-CM

## 2025-02-20 PROCEDURE — S0260 H&P FOR SURGERY: HCPCS | Performed by: ORTHOPAEDIC SURGERY

## 2025-02-20 NOTE — TELEPHONE ENCOUNTER
Risk Factor yes no   Age >75  X   BMI <20 >40  X   Patient History     Chronic Pain (2 or more meds/Pain Management)  X   ETOH (more than 3 drinks Daily)  X   Uncontrolled Depression/Bipolar/Schizoaffective Disorder  X   Arrhythmias--  X   Stent placement/MI  X   DVT/PE  X   Pacemaker  X   HTN (uncontrolled or requiring more than 2 medications)  X   CHF/Retained fluids/Edema  X   Stroke with Residual   X   COPD/Asthma X    BERHANE--Non-compliant with CPAP  X   Diabetes (on insulin or more than 2 meds)         A1C:5.3  X   BPH/Urinary retention (on medication)  X   CKD  X   Home environment and support     Current ambulation status (use of cane, walker, W/C, Multiple falls/weakness) X    Stairs to enter and throughout home X    Lives Alone  X   Doesn't have support at home  X   Outpatient Screening Assessment    Home needs: (Walker/BSC):  Has walker  ? Steps 3 steps with rail   Caregiver 24-48hrs post-discharge:       Discharge Plan:    PT    Prescriptions: Meds to bed    Home medications:   [] Blood thinner/anti-coag therapy--   [] BPH or diuretic--  [] BP meds--   ? Pain/Anti-inflammatories--Mobic   Pre-op Education:  Educate patient on spinal anesthesia/pain control:  ? patient verbalize understanding    Educate patient on hospital course/timeline:  ?  patient verbalize understanding    Joint Care Class:  ?  yes [] no  Notes:

## 2025-02-20 NOTE — H&P
"Patient: Juli Manzanares        YOB: 1955    Medical Record Number: [id number]    Admitting Physician: Dr. Demarcus Barbosa    Reason for Admission: End Stage Right Knee OA    History of Present Illness: 69 y.o. female presents with severe end stage knee osteoarthritis which has not been responsive to the full compliment of conservative measures. Despite conservative attempts, there is still severe, constant activity limiting pain. Given the severity of the pain, the patient has elected to proceed with knee replacement.    Allergies:   Allergies   Allergen Reactions    Bactrim [Sulfamethoxazole-Trimethoprim] Itching and Rash    Carbocaine [Mepivacaine Hcl] Unknown - Low Severity     \"PASSED OUT\" (OK WITH LIDOCAINE)    Novocain [Procaine] Unknown - Low Severity     \"PASSED OUT/(\"OK WITH LIDOCAINE)    Penicillins Unknown - Low Severity     \"CHILDHOOD REACTION\"    Clindamycin Rash     Rash and swelling on face    Latex Rash         Current Medications:  Home Medications:    No current facility-administered medications on file prior to encounter.     Current Outpatient Medications on File Prior to Encounter   Medication Sig    BIOTIN PO Take 10,000 mcg by mouth Daily. PT HOLDING FOR SURGERY    clobetasol (TEMOVATE) 0.05 % cream Apply 1 Application topically to the appropriate area as directed As Needed.    meloxicam (MOBIC) 7.5 MG tablet TAKE 1 TABLET BY MOUTH EVERY DAY (Patient taking differently: Take 1 tablet by mouth Daily. TO HOLD 1 WEEK BEFORE SURGERY)    Multiple Vitamins-Minerals (MULTI FOR HER 50+ PO) Take 1 tablet by mouth Daily. PT HOLDING FOR SURGERY    Wheat Dextrin (BENEFIBER DRINK MIX PO) Take 1 teaspoon(s) by mouth Every Morning.     PRN Meds:.    PMH:     Past Medical History:   Diagnosis Date    Allergic 1990s    Penicillin, Bactrim, and Carbocaine    Arthritis     Asthma     LAST ATTACK AGE 20'S    DDD (degenerative disc disease), lumbar     Diverticulosis     H/O colonoscopy     " Hemorrhoids     History of COVID-19     History of dermatitis     Hyperlipidemia     Knee pain     Low back pain Nov. 2021    Pain & discomfort gradually worsened & it is difficult to stand up straight.    Pneumonia Sept 2002    Scoliosis 1990    Discomfort gradually worsened through the years.    Spinal stenosis     Toenail fungus     ADVISED PT TO NOTIFY SURGEON PRIOR TO SURGERY       PF/Surg/Soc Hx:     Past Surgical History:   Procedure Laterality Date    COLONOSCOPY N/A 07/12/2017    Procedure: COLONOSCOPY into cecum;  Surgeon: Farida Enriquez MD;  Location: [facility] ENDOSCOPY;  Service:     COLONOSCOPY N/A 12/01/2023    Procedure: COLONOSCOPY to Cecum;  Surgeon: Todd Wong MD;  [place] [facility] [place];  Service: Gastroenterology;  Laterality: N/A;  Diverticulosis    DENTAL PROCEDURE      EYE SURGERY  Feb. 2023    I had cataract surgery & implants in both eyes on 2/14/23 & 2/23/23    SKIN GRAFT      LOWER MOUTH (LOWER GUM)    WISDOM TOOTH EXTRACTION          Social History     Occupational History     Employer: RETIRED   Tobacco Use    Smoking status: Never     Passive exposure: Never    Smokeless tobacco: Never   Vaping Use    Vaping status: Never Used   Substance and Sexual Activity    Alcohol use: Not Currently     Comment: 2 - 3 drinks yearly    Drug use: Never    Sexual activity: Not Currently     Birth control/protection: Post-menopausal      Social History     Social History Narrative    Not on file        Family History   Problem Relation Age of Onset    Cancer Mother         Lung cancer in 2005    Lung cancer Mother     Hypertension Father     Diabetes Father     Kidney disease Father         My father had prostate cancer in the late 1980s    Prostate cancer Father     Cancer Father         Colon cancer in 2021    Vision loss Father         Had glaucoma    Cancer Cousin     Malig Hyperthermia Neg Hx          Review of Systems:   A 14 point review of systems was performed, pertinent  positives discussed above, all other systems are negative    Physical Exam: 69 y.o. female  Vital Signs :   There were no vitals filed for this visit.    General: Alert and Oriented x 3, No acute distress.  Psych: mood and affect appropriate; recent and remote memory intact  Eyes: conjunctiva clear; pupils equally round and reactive, sclera nonicteric  CV: no peripheral edema  Resp: normal respiratory effort  Skin: no rashes or wounds; normal turgor  Musculosketetal; pain and crepitance with knee range of motion  Vascular: palpable distal pulses    Superficial noninfectious abrasion to right upper arm    ROM: 4-110    Xrays:  -4 views (AP, lateral, and sunrise) were reviewed demonstrating end-stage OA with bone on bone articulation.  -A full length AP xray was ordered today for purposes of operative alignment demonstrating end stage arthritic findings. There are no previous full length films for review    Assessment:  End-stage Right knee osteoarthritis. Conservative measures have failed.      Plan:  The plan is to proceed with Right Total Knee Replacement. The patient voiced understanding of the risks, benefits, and alternative forms of treatment that were discussed with Dr Barbosa at the time of scheduling. Labs and EKG reviewed. She did her joint class. States she has gotten dental clearance.     Plan for outpatient/same day surgery    Demarcus Barbosa MD  2/20/2025      This note was copied and pasted from most recent office visit.  Interval addendum may be made to update this documentation as needed.    Demarcus Barbosa MD

## 2025-02-20 NOTE — PROGRESS NOTES
"Patient: Juli Manzanares        YOB: 1955    Medical Record Number: [id number]    Admitting Physician: Dr. Demarcus Barbosa    Reason for Admission: End Stage Right Knee OA    History of Present Illness: 69 y.o. female presents with severe end stage knee osteoarthritis which has not been responsive to the full compliment of conservative measures. Despite conservative attempts, there is still severe, constant activity limiting pain. Given the severity of the pain, the patient has elected to proceed with knee replacement.    Allergies:   Allergies   Allergen Reactions    Bactrim [Sulfamethoxazole-Trimethoprim] Itching and Rash    Carbocaine [Mepivacaine Hcl] Unknown - Low Severity     \"PASSED OUT\" (OK WITH LIDOCAINE)    Novocain [Procaine] Unknown - Low Severity     \"PASSED OUT/(\"OK WITH LIDOCAINE)    Penicillins Unknown - Low Severity     \"CHILDHOOD REACTION\"    Clindamycin Rash     Rash and swelling on face    Latex Rash         Current Medications:  Home Medications:    Current Outpatient Medications on File Prior to Visit   Medication Sig    BIOTIN PO Take 10,000 mcg by mouth Daily. PT HOLDING FOR SURGERY    Calcium-Magnesium-Vitamin D (CALCIUM 1200+D3 PO) Take 2 tablets by mouth Daily.    Cholecalciferol 25 MCG (1000 UT) tablet Take 1 tablet by mouth Daily.    clobetasol (TEMOVATE) 0.05 % cream Apply 1 Application topically to the appropriate area as directed As Needed.    meloxicam (MOBIC) 7.5 MG tablet TAKE 1 TABLET BY MOUTH EVERY DAY (Patient taking differently: Take 1 tablet by mouth Daily. TO HOLD 1 WEEK BEFORE SURGERY)    Multiple Vitamins-Minerals (MULTI FOR HER 50+ PO) Take 1 tablet by mouth Daily. PT HOLDING FOR SURGERY    rosuvastatin (CRESTOR) 10 MG tablet TAKE 1 TABLET BY MOUTH EVERY DAY    Wheat Dextrin (BENEFIBER DRINK MIX PO) Take 1 teaspoon(s) by mouth Every Morning.     No current facility-administered medications on file prior to visit.     PRN Meds:.    PMH:     Past Medical " History:   Diagnosis Date    Allergic 1990s    Penicillin, Bactrim, and Carbocaine    Arthritis     Asthma     LAST ATTACK AGE 20'S    DDD (degenerative disc disease), lumbar     Diverticulosis     H/O colonoscopy     Hemorrhoids     History of COVID-19     History of dermatitis     Hyperlipidemia     Knee pain     Low back pain Nov. 2021    Pain & discomfort gradually worsened & it is difficult to stand up straight.    Pneumonia Sept 2002    Scoliosis 1990    Discomfort gradually worsened through the years.    Spinal stenosis     Toenail fungus     ADVISED PT TO NOTIFY SURGEON PRIOR TO SURGERY       PF/Surg/Soc Hx:     Past Surgical History:   Procedure Laterality Date    COLONOSCOPY N/A 07/12/2017    Procedure: COLONOSCOPY into cecum;  Surgeon: Farida Enriquez MD;  Location: [facility] ENDOSCOPY;  Service:     COLONOSCOPY N/A 12/01/2023    Procedure: COLONOSCOPY to Cecum;  Surgeon: Todd Wong MD;  Location: [facility] MAIN OR;  Service: Gastroenterology;  Laterality: N/A;  Diverticulosis    DENTAL PROCEDURE      EYE SURGERY  Feb. 2023    I had cataract surgery & implants in both eyes on 2/14/23 & 2/23/23    SKIN GRAFT      LOWER MOUTH (LOWER GUM)    WISDOM TOOTH EXTRACTION          Social History     Occupational History     Employer: RETIRED   Tobacco Use    Smoking status: Never     Passive exposure: Never    Smokeless tobacco: Never   Vaping Use    Vaping status: Never Used   Substance and Sexual Activity    Alcohol use: Not Currently     Comment: 2 - 3 drinks yearly    Drug use: Never    Sexual activity: Not Currently     Birth control/protection: Post-menopausal      Social History     Social History Narrative    Not on file        Family History   Problem Relation Age of Onset    Cancer Mother         Lung cancer in 2005    Lung cancer Mother     Hypertension Father     Diabetes Father     Kidney disease Father         My father had prostate cancer in the late 1980s    Prostate cancer Father      "Cancer Father         Colon cancer in 2021    Vision loss Father         Had glaucoma    Cancer Cousin     [place] Hyperthermia Neg Hx          Review of Systems:   A 14 point review of systems was performed, pertinent positives discussed above, all other systems are negative    Physical Exam: 69 y.o. female  Vital Signs :   Vitals:    02/20/25 0858   Temp: 97.8 °F (36.6 °C)   TempSrc: Temporal   Weight: 77.2 kg (170 lb 4.8 oz)   Height: 170.2 cm (67\")   PainSc: 2    PainLoc: Knee     General: Alert and Oriented x 3, No acute distress.  Psych: mood and affect appropriate; recent and remote memory intact  Eyes: conjunctiva clear; pupils equally round and reactive, sclera nonicteric  CV: no peripheral edema  Resp: normal respiratory effort  Skin: no rashes or wounds; normal turgor  Musculosketetal; pain and crepitance with knee range of motion  Vascular: palpable distal pulses    Superficial noninfectious abrasion to right upper arm    ROM: 4-110    Xrays:  -4 views (AP, lateral, and sunrise) were reviewed demonstrating end-stage OA with bone on bone articulation.  -A full length AP xray was ordered today for purposes of operative alignment demonstrating end stage arthritic findings. There are no previous full length films for review    Assessment:  End-stage Right knee osteoarthritis. Conservative measures have failed.      Plan:  The plan is to proceed with Right Total Knee Replacement. The patient voiced understanding of the risks, benefits, and alternative forms of treatment that were discussed with Dr Barbosa at the time of scheduling. Labs and EKG reviewed. She did her joint class. States she has gotten dental clearance.     Plan for outpatient/same day surgery    Demarcus Barbosa MD  2/20/2025         "

## 2025-02-25 NOTE — CASE MANAGEMENT/SOCIAL WORK
Continued Stay Note  [facility]     Patient Name: Juli Manzanares  MRN: [id number]  Today's Date: 2/25/2025    Admit Date: (Not on file)    Plan: Home with [facility]   Discharge Plan       Row Name 02/25/25 1515       Plan    Plan Home with [facility]    Patient/Family in Agreement with Plan yes    Provided Post Acute Provider List? Yes    Post Acute Provider List Home Health    Delivered To Patient    Method of Delivery Telephone                   Discharge Codes    No documentation.                       Shannon Epley, RN

## 2025-02-25 NOTE — DISCHARGE PLACEMENT REQUEST
"Juli He (69 y.o. Female)       Date of Birth   1955    Social Security Number       Address   [address] [address]    Home Phone   [phone]    MRN   [id number]       Amish   Confucianist    Marital Status                               Admission Date       Admission Type   Elective    Admitting Provider   Demarcus Barbosa MD    Attending Provider   Demarcus Barbosa MD    Department, Room/Bed   [facility], --/--       Discharge Date       Discharge Disposition       Discharge Destination                                 Attending Provider: Demarcus Barbosa MD    Allergies: Bactrim [Sulfamethoxazole-trimethoprim], Carbocaine [Mepivacaine Hcl], Novocain [Procaine], Penicillins, Clindamycin, Latex    Isolation: None   Infection: None   Code Status: Prior    Ht: 170.2 cm (67\")   Wt: 77.2 kg (170 lb 4.8 oz)    Admission Cmt: None   Principal Problem: Primary osteoarthritis of right knee [M17.11]                   Active Insurance as of 2/26/2025       Primary Coverage       Payor Plan Insurance Group Employer/Plan Group    ANTHEM MEDICARE REPLACEMENT ANTHEM MEDICARE ADVANTAGE PPO [id number]       Payor Plan Address Payor Plan Phone Number Payor Plan Fax Number Effective Dates    [address] [phone]  1/1/2020 - None Entered    [facility] [id number]         Subscriber Name Subscriber Birth Date Member ID       JULI HE 1955 [id number]                     Emergency Contacts        (Rel.) Home Phone Work Phone Mobile Phone    Naga He (Spouse) [phone] -- [phone]                "

## 2025-02-26 ENCOUNTER — HOSPITAL ENCOUNTER (OUTPATIENT)
Facility: HOSPITAL | Age: 70
Setting detail: HOSPITAL OUTPATIENT SURGERY
Discharge: HOME HEALTH | End: 2025-02-26
Attending: ORTHOPAEDIC SURGERY | Admitting: ORTHOPAEDIC SURGERY
Payer: MEDICARE

## 2025-02-26 ENCOUNTER — APPOINTMENT (OUTPATIENT)
Dept: GENERAL RADIOLOGY | Facility: HOSPITAL | Age: 70
End: 2025-02-26
Payer: MEDICARE

## 2025-02-26 ENCOUNTER — ANESTHESIA EVENT (OUTPATIENT)
Dept: PERIOP | Facility: HOSPITAL | Age: 70
End: 2025-02-26
Payer: MEDICARE

## 2025-02-26 ENCOUNTER — ANESTHESIA (OUTPATIENT)
Dept: PERIOP | Facility: HOSPITAL | Age: 70
End: 2025-02-26
Payer: MEDICARE

## 2025-02-26 VITALS
RESPIRATION RATE: 18 BRPM | BODY MASS INDEX: 26.71 KG/M2 | HEART RATE: 69 BPM | TEMPERATURE: 97.7 F | HEIGHT: 67 IN | WEIGHT: 170.19 LBS | SYSTOLIC BLOOD PRESSURE: 110 MMHG | OXYGEN SATURATION: 99 % | DIASTOLIC BLOOD PRESSURE: 55 MMHG

## 2025-02-26 DIAGNOSIS — M17.11 PRIMARY OSTEOARTHRITIS OF RIGHT KNEE: Primary | ICD-10-CM

## 2025-02-26 PROCEDURE — C1776 JOINT DEVICE (IMPLANTABLE): HCPCS | Performed by: ORTHOPAEDIC SURGERY

## 2025-02-26 PROCEDURE — 25010000002 LIDOCAINE PF 2% 2 % SOLUTION: Performed by: NURSE ANESTHETIST, CERTIFIED REGISTERED

## 2025-02-26 PROCEDURE — 25010000002 DROPERIDOL PER 5 MG: Performed by: NURSE ANESTHETIST, CERTIFIED REGISTERED

## 2025-02-26 PROCEDURE — C1713 ANCHOR/SCREW BN/BN,TIS/BN: HCPCS | Performed by: ORTHOPAEDIC SURGERY

## 2025-02-26 PROCEDURE — 97161 PT EVAL LOW COMPLEX 20 MIN: CPT

## 2025-02-26 PROCEDURE — 25010000002 SUGAMMADEX 200 MG/2ML SOLUTION: Performed by: NURSE ANESTHETIST, CERTIFIED REGISTERED

## 2025-02-26 PROCEDURE — 27447 TOTAL KNEE ARTHROPLASTY: CPT | Performed by: SPECIALIST/TECHNOLOGIST, OTHER

## 2025-02-26 PROCEDURE — 25010000002 FENTANYL CITRATE (PF) 50 MCG/ML SOLUTION: Performed by: STUDENT IN AN ORGANIZED HEALTH CARE EDUCATION/TRAINING PROGRAM

## 2025-02-26 PROCEDURE — 25010000002 MIDAZOLAM PER 1 MG: Performed by: STUDENT IN AN ORGANIZED HEALTH CARE EDUCATION/TRAINING PROGRAM

## 2025-02-26 PROCEDURE — 25010000002 DEXAMETHASONE SODIUM PHOSPHATE 20 MG/5ML SOLUTION: Performed by: STUDENT IN AN ORGANIZED HEALTH CARE EDUCATION/TRAINING PROGRAM

## 2025-02-26 PROCEDURE — 25010000002 HYDROMORPHONE 1 MG/ML SOLUTION: Performed by: NURSE ANESTHETIST, CERTIFIED REGISTERED

## 2025-02-26 PROCEDURE — 25010000002 MORPHINE PER 10 MG: Performed by: ORTHOPAEDIC SURGERY

## 2025-02-26 PROCEDURE — 25810000003 SODIUM CHLORIDE 0.9 % SOLUTION 250 ML FLEX CONT: Performed by: ORTHOPAEDIC SURGERY

## 2025-02-26 PROCEDURE — 25010000002 ACETAMINOPHEN 10 MG/ML SOLUTION: Performed by: NURSE ANESTHETIST, CERTIFIED REGISTERED

## 2025-02-26 PROCEDURE — 25010000002 FENTANYL CITRATE (PF) 50 MCG/ML SOLUTION: Performed by: NURSE ANESTHETIST, CERTIFIED REGISTERED

## 2025-02-26 PROCEDURE — 73560 X-RAY EXAM OF KNEE 1 OR 2: CPT

## 2025-02-26 PROCEDURE — 27447 TOTAL KNEE ARTHROPLASTY: CPT | Performed by: ORTHOPAEDIC SURGERY

## 2025-02-26 PROCEDURE — 25010000002 ROPIVACAINE PER 1 MG: Performed by: STUDENT IN AN ORGANIZED HEALTH CARE EDUCATION/TRAINING PROGRAM

## 2025-02-26 PROCEDURE — 97110 THERAPEUTIC EXERCISES: CPT

## 2025-02-26 PROCEDURE — 25010000002 ROPIVACAINE PER 1 MG: Performed by: ORTHOPAEDIC SURGERY

## 2025-02-26 PROCEDURE — 25010000002 ONDANSETRON PER 1 MG: Performed by: NURSE ANESTHETIST, CERTIFIED REGISTERED

## 2025-02-26 PROCEDURE — 25810000003 LACTATED RINGERS PER 1000 ML: Performed by: STUDENT IN AN ORGANIZED HEALTH CARE EDUCATION/TRAINING PROGRAM

## 2025-02-26 PROCEDURE — 25010000002 PROPOFOL 10 MG/ML EMULSION: Performed by: NURSE ANESTHETIST, CERTIFIED REGISTERED

## 2025-02-26 PROCEDURE — 25810000003 LACTATED RINGERS SOLUTION: Performed by: ORTHOPAEDIC SURGERY

## 2025-02-26 PROCEDURE — 25010000002 CEFAZOLIN PER 500 MG: Performed by: ORTHOPAEDIC SURGERY

## 2025-02-26 PROCEDURE — 25010000002 MAGNESIUM SULFATE PER 500 MG OF MAGNESIUM: Performed by: NURSE ANESTHETIST, CERTIFIED REGISTERED

## 2025-02-26 PROCEDURE — 25010000002 VANCOMYCIN HCL 1.25 G RECONSTITUTED SOLUTION 1 EACH VIAL: Performed by: ORTHOPAEDIC SURGERY

## 2025-02-26 PROCEDURE — 25010000002 EPINEPHRINE 1 MG/ML SOLUTION 30 ML VIAL: Performed by: ORTHOPAEDIC SURGERY

## 2025-02-26 DEVICE — GENESIS II BICONVEX PATELLA 26MM
Type: IMPLANTABLE DEVICE | Site: KNEE | Status: FUNCTIONAL
Brand: GENESIS II

## 2025-02-26 DEVICE — LEGION NARROW POSTERIOR STABILIZED                                    OXINIUM SIZE 4N RIGHT
Type: IMPLANTABLE DEVICE | Site: KNEE | Status: FUNCTIONAL
Brand: LEGION

## 2025-02-26 DEVICE — DEV CONTRL TISS STRATAFIX SYMM PDS PLUS VIL CT-1 60CM: Type: IMPLANTABLE DEVICE | Site: KNEE | Status: FUNCTIONAL

## 2025-02-26 DEVICE — IMPLANTABLE DEVICE: Type: IMPLANTABLE DEVICE | Status: FUNCTIONAL

## 2025-02-26 DEVICE — DEV CONTRL TISS STRATAFIXSPIRALMNCRYL PLSPS2 REV3/0 45CM: Type: IMPLANTABLE DEVICE | Site: KNEE | Status: FUNCTIONAL

## 2025-02-26 DEVICE — GENESIS II NON-POROUS TIBIAL                                    BASEPLATE SIZE 4 RIGHT
Type: IMPLANTABLE DEVICE | Site: KNEE | Status: FUNCTIONAL
Brand: GENESIS II

## 2025-02-26 DEVICE — LEGION PS HIGH FLEX XLPE SZ 3-4 10MM
Type: IMPLANTABLE DEVICE | Site: KNEE | Status: FUNCTIONAL
Brand: LEGION

## 2025-02-26 DEVICE — PALACOS® R IS A FAST-CURING, RADIOPAQUE, POLY(METHYL METHACRYLATE)-BASED BONE CEMENT.PALACOS ® R CONTAINS THE X-RAY CONTRAST MEDIUM ZIRCONIUM DIOXIDE. TO IMPROVE VISIBILITY IN THE SURGICAL FIELD PALACOS ® R HAS BEEN COLOURED WITH CHLOROPHYLL (E141). THE BONE CEMENT IS PREPARED DIRECTLY BEFORE USE BY MIXING A POLYMER POWDER COMPONENT WITH A LIQUID MONOMER COMPONENT. A DUCTILE DOUGH FORMS WHICH CURES WITHIN A FEW MINUTES.
Type: IMPLANTABLE DEVICE | Site: KNEE | Status: FUNCTIONAL
Brand: PALACOS®

## 2025-02-26 RX ORDER — PANTOPRAZOLE SODIUM 40 MG/1
40 TABLET, DELAYED RELEASE ORAL DAILY
Qty: 7 TABLET | Refills: 0 | Status: SHIPPED | OUTPATIENT
Start: 2025-02-26 | End: 2025-03-05

## 2025-02-26 RX ORDER — ONDANSETRON 4 MG/1
4 TABLET, FILM COATED ORAL EVERY 8 HOURS PRN
Qty: 10 TABLET | Refills: 0 | Status: SHIPPED | OUTPATIENT
Start: 2025-02-26

## 2025-02-26 RX ORDER — PREGABALIN 75 MG/1
75 CAPSULE ORAL ONCE
Status: COMPLETED | OUTPATIENT
Start: 2025-02-26 | End: 2025-02-26

## 2025-02-26 RX ORDER — FENTANYL CITRATE 50 UG/ML
50 INJECTION, SOLUTION INTRAMUSCULAR; INTRAVENOUS ONCE AS NEEDED
Status: COMPLETED | OUTPATIENT
Start: 2025-02-26 | End: 2025-02-26

## 2025-02-26 RX ORDER — TRANEXAMIC ACID 100 MG/ML
INJECTION, SOLUTION INTRAVENOUS AS NEEDED
Status: DISCONTINUED | OUTPATIENT
Start: 2025-02-26 | End: 2025-02-26 | Stop reason: SURG

## 2025-02-26 RX ORDER — HYDROCODONE BITARTRATE AND ACETAMINOPHEN 7.5; 325 MG/1; MG/1
1 TABLET ORAL EVERY 4 HOURS PRN
Status: DISCONTINUED | OUTPATIENT
Start: 2025-02-26 | End: 2025-02-26 | Stop reason: HOSPADM

## 2025-02-26 RX ORDER — HYDROCODONE BITARTRATE AND ACETAMINOPHEN 7.5; 325 MG/1; MG/1
1-2 TABLET ORAL EVERY 4 HOURS PRN
Qty: 28 TABLET | Refills: 0 | Status: SHIPPED | OUTPATIENT
Start: 2025-02-26 | End: 2025-02-28 | Stop reason: SDUPTHER

## 2025-02-26 RX ORDER — EPHEDRINE SULFATE 50 MG/ML
5 INJECTION, SOLUTION INTRAVENOUS ONCE AS NEEDED
Status: DISCONTINUED | OUTPATIENT
Start: 2025-02-26 | End: 2025-02-26 | Stop reason: HOSPADM

## 2025-02-26 RX ORDER — ONDANSETRON 4 MG/1
4 TABLET, ORALLY DISINTEGRATING ORAL EVERY 6 HOURS PRN
Status: CANCELLED | OUTPATIENT
Start: 2025-02-26

## 2025-02-26 RX ORDER — PROMETHAZINE HYDROCHLORIDE 25 MG/1
25 TABLET ORAL ONCE AS NEEDED
Status: DISCONTINUED | OUTPATIENT
Start: 2025-02-26 | End: 2025-02-26 | Stop reason: HOSPADM

## 2025-02-26 RX ORDER — ROCURONIUM BROMIDE 10 MG/ML
INJECTION, SOLUTION INTRAVENOUS AS NEEDED
Status: DISCONTINUED | OUTPATIENT
Start: 2025-02-26 | End: 2025-02-26 | Stop reason: SURG

## 2025-02-26 RX ORDER — LIDOCAINE HYDROCHLORIDE 20 MG/ML
INJECTION, SOLUTION EPIDURAL; INFILTRATION; INTRACAUDAL; PERINEURAL AS NEEDED
Status: DISCONTINUED | OUTPATIENT
Start: 2025-02-26 | End: 2025-02-26 | Stop reason: SURG

## 2025-02-26 RX ORDER — POLYETHYLENE GLYCOL 3350 17 G/17G
17 POWDER, FOR SOLUTION ORAL DAILY
Qty: 238 G | Refills: 0 | Status: SHIPPED | OUTPATIENT
Start: 2025-02-26 | End: 2025-03-12

## 2025-02-26 RX ORDER — ATROPINE SULFATE 0.4 MG/ML
0.4 INJECTION, SOLUTION INTRAMUSCULAR; INTRAVENOUS; SUBCUTANEOUS ONCE AS NEEDED
Status: DISCONTINUED | OUTPATIENT
Start: 2025-02-26 | End: 2025-02-26 | Stop reason: HOSPADM

## 2025-02-26 RX ORDER — PROMETHAZINE HYDROCHLORIDE 25 MG/1
25 SUPPOSITORY RECTAL ONCE AS NEEDED
Status: DISCONTINUED | OUTPATIENT
Start: 2025-02-26 | End: 2025-02-26 | Stop reason: HOSPADM

## 2025-02-26 RX ORDER — ASPIRIN 81 MG/1
81 TABLET ORAL EVERY 12 HOURS SCHEDULED
Status: CANCELLED | OUTPATIENT
Start: 2025-02-27

## 2025-02-26 RX ORDER — MIDAZOLAM HYDROCHLORIDE 1 MG/ML
0.5 INJECTION, SOLUTION INTRAMUSCULAR; INTRAVENOUS
Status: DISCONTINUED | OUTPATIENT
Start: 2025-02-26 | End: 2025-02-26 | Stop reason: HOSPADM

## 2025-02-26 RX ORDER — SODIUM CHLORIDE, SODIUM LACTATE, POTASSIUM CHLORIDE, CALCIUM CHLORIDE 600; 310; 30; 20 MG/100ML; MG/100ML; MG/100ML; MG/100ML
9 INJECTION, SOLUTION INTRAVENOUS CONTINUOUS
Status: DISCONTINUED | OUTPATIENT
Start: 2025-02-26 | End: 2025-02-26 | Stop reason: HOSPADM

## 2025-02-26 RX ORDER — KETOROLAC TROMETHAMINE 30 MG/ML
15 INJECTION, SOLUTION INTRAMUSCULAR; INTRAVENOUS ONCE AS NEEDED
Status: CANCELLED | OUTPATIENT
Start: 2025-02-26 | End: 2025-02-27

## 2025-02-26 RX ORDER — IPRATROPIUM BROMIDE AND ALBUTEROL SULFATE 2.5; .5 MG/3ML; MG/3ML
3 SOLUTION RESPIRATORY (INHALATION) ONCE AS NEEDED
Status: DISCONTINUED | OUTPATIENT
Start: 2025-02-26 | End: 2025-02-26 | Stop reason: HOSPADM

## 2025-02-26 RX ORDER — DOCUSATE SODIUM 100 MG/1
100 CAPSULE, LIQUID FILLED ORAL 2 TIMES DAILY
Qty: 60 CAPSULE | Refills: 0 | Status: SHIPPED | OUTPATIENT
Start: 2025-02-26

## 2025-02-26 RX ORDER — HYDROCODONE BITARTRATE AND ACETAMINOPHEN 5; 325 MG/1; MG/1
1 TABLET ORAL ONCE AS NEEDED
Status: COMPLETED | OUTPATIENT
Start: 2025-02-26 | End: 2025-02-26

## 2025-02-26 RX ORDER — ASPIRIN 81 MG/1
81 TABLET ORAL 2 TIMES DAILY
Status: CANCELLED | OUTPATIENT
Start: 2025-02-27 | End: 2025-02-27

## 2025-02-26 RX ORDER — DEXAMETHASONE SODIUM PHOSPHATE 4 MG/ML
INJECTION, SOLUTION INTRA-ARTICULAR; INTRALESIONAL; INTRAMUSCULAR; INTRAVENOUS; SOFT TISSUE
Status: COMPLETED | OUTPATIENT
Start: 2025-02-26 | End: 2025-02-26

## 2025-02-26 RX ORDER — MELOXICAM 15 MG/1
7.5 TABLET ORAL ONCE
Status: COMPLETED | OUTPATIENT
Start: 2025-02-26 | End: 2025-02-26

## 2025-02-26 RX ORDER — FENTANYL CITRATE 50 UG/ML
INJECTION, SOLUTION INTRAMUSCULAR; INTRAVENOUS AS NEEDED
Status: DISCONTINUED | OUTPATIENT
Start: 2025-02-26 | End: 2025-02-26 | Stop reason: SURG

## 2025-02-26 RX ORDER — FLUMAZENIL 0.1 MG/ML
0.2 INJECTION INTRAVENOUS AS NEEDED
Status: DISCONTINUED | OUTPATIENT
Start: 2025-02-26 | End: 2025-02-26 | Stop reason: HOSPADM

## 2025-02-26 RX ORDER — NALOXONE HCL 0.4 MG/ML
0.2 VIAL (ML) INJECTION AS NEEDED
Status: DISCONTINUED | OUTPATIENT
Start: 2025-02-26 | End: 2025-02-26 | Stop reason: HOSPADM

## 2025-02-26 RX ORDER — PROMETHAZINE HYDROCHLORIDE 25 MG/1
12.5 TABLET ORAL EVERY 4 HOURS PRN
Status: CANCELLED | OUTPATIENT
Start: 2025-02-26

## 2025-02-26 RX ORDER — DIPHENHYDRAMINE HYDROCHLORIDE 50 MG/ML
12.5 INJECTION INTRAMUSCULAR; INTRAVENOUS
Status: DISCONTINUED | OUTPATIENT
Start: 2025-02-26 | End: 2025-02-26 | Stop reason: HOSPADM

## 2025-02-26 RX ORDER — ONDANSETRON 2 MG/ML
4 INJECTION INTRAMUSCULAR; INTRAVENOUS ONCE AS NEEDED
Status: DISCONTINUED | OUTPATIENT
Start: 2025-02-26 | End: 2025-02-26 | Stop reason: HOSPADM

## 2025-02-26 RX ORDER — ACETAMINOPHEN 325 MG/1
650 TABLET ORAL EVERY 6 HOURS PRN
Status: CANCELLED | OUTPATIENT
Start: 2025-02-26 | End: 2025-03-01

## 2025-02-26 RX ORDER — LIDOCAINE HYDROCHLORIDE 10 MG/ML
0.5 INJECTION, SOLUTION INFILTRATION; PERINEURAL ONCE AS NEEDED
Status: DISCONTINUED | OUTPATIENT
Start: 2025-02-26 | End: 2025-02-26 | Stop reason: HOSPADM

## 2025-02-26 RX ORDER — MELOXICAM 7.5 MG/1
7.5 TABLET ORAL DAILY
Qty: 7 TABLET | Refills: 0 | Status: SHIPPED | OUTPATIENT
Start: 2025-02-26

## 2025-02-26 RX ORDER — DROPERIDOL 2.5 MG/ML
INJECTION, SOLUTION INTRAMUSCULAR; INTRAVENOUS AS NEEDED
Status: DISCONTINUED | OUTPATIENT
Start: 2025-02-26 | End: 2025-02-26 | Stop reason: SURG

## 2025-02-26 RX ORDER — HYDRALAZINE HYDROCHLORIDE 20 MG/ML
5 INJECTION INTRAMUSCULAR; INTRAVENOUS
Status: DISCONTINUED | OUTPATIENT
Start: 2025-02-26 | End: 2025-02-26 | Stop reason: HOSPADM

## 2025-02-26 RX ORDER — PROPOFOL 10 MG/ML
VIAL (ML) INTRAVENOUS AS NEEDED
Status: DISCONTINUED | OUTPATIENT
Start: 2025-02-26 | End: 2025-02-26 | Stop reason: SURG

## 2025-02-26 RX ORDER — ACETAMINOPHEN 10 MG/ML
INJECTION, SOLUTION INTRAVENOUS AS NEEDED
Status: DISCONTINUED | OUTPATIENT
Start: 2025-02-26 | End: 2025-02-26 | Stop reason: SURG

## 2025-02-26 RX ORDER — HYDROMORPHONE HYDROCHLORIDE 1 MG/ML
0.25 INJECTION, SOLUTION INTRAMUSCULAR; INTRAVENOUS; SUBCUTANEOUS
Status: DISCONTINUED | OUTPATIENT
Start: 2025-02-26 | End: 2025-02-26 | Stop reason: HOSPADM

## 2025-02-26 RX ORDER — ASPIRIN 81 MG/1
TABLET ORAL
Qty: 60 TABLET | Refills: 0 | Status: SHIPPED | OUTPATIENT
Start: 2025-02-26 | End: 2025-04-11

## 2025-02-26 RX ORDER — ROPIVACAINE HYDROCHLORIDE 5 MG/ML
INJECTION, SOLUTION EPIDURAL; INFILTRATION; PERINEURAL
Status: COMPLETED | OUTPATIENT
Start: 2025-02-26 | End: 2025-02-26

## 2025-02-26 RX ORDER — LABETALOL HYDROCHLORIDE 5 MG/ML
5 INJECTION, SOLUTION INTRAVENOUS
Status: DISCONTINUED | OUTPATIENT
Start: 2025-02-26 | End: 2025-02-26 | Stop reason: HOSPADM

## 2025-02-26 RX ORDER — FENTANYL CITRATE 50 UG/ML
25 INJECTION, SOLUTION INTRAMUSCULAR; INTRAVENOUS
Status: DISCONTINUED | OUTPATIENT
Start: 2025-02-26 | End: 2025-02-26 | Stop reason: HOSPADM

## 2025-02-26 RX ORDER — MAGNESIUM HYDROXIDE 1200 MG/15ML
LIQUID ORAL AS NEEDED
Status: DISCONTINUED | OUTPATIENT
Start: 2025-02-26 | End: 2025-02-26 | Stop reason: HOSPADM

## 2025-02-26 RX ORDER — SODIUM CHLORIDE 0.9 % (FLUSH) 0.9 %
3 SYRINGE (ML) INJECTION EVERY 12 HOURS SCHEDULED
Status: DISCONTINUED | OUTPATIENT
Start: 2025-02-26 | End: 2025-02-26 | Stop reason: HOSPADM

## 2025-02-26 RX ORDER — MAGNESIUM SULFATE HEPTAHYDRATE 500 MG/ML
INJECTION, SOLUTION INTRAMUSCULAR; INTRAVENOUS AS NEEDED
Status: DISCONTINUED | OUTPATIENT
Start: 2025-02-26 | End: 2025-02-26 | Stop reason: SURG

## 2025-02-26 RX ORDER — ONDANSETRON 2 MG/ML
INJECTION INTRAMUSCULAR; INTRAVENOUS AS NEEDED
Status: DISCONTINUED | OUTPATIENT
Start: 2025-02-26 | End: 2025-02-26 | Stop reason: SURG

## 2025-02-26 RX ORDER — EPHEDRINE SULFATE 50 MG/ML
INJECTION INTRAVENOUS AS NEEDED
Status: DISCONTINUED | OUTPATIENT
Start: 2025-02-26 | End: 2025-02-26 | Stop reason: SURG

## 2025-02-26 RX ORDER — SODIUM CHLORIDE 0.9 % (FLUSH) 0.9 %
3-10 SYRINGE (ML) INJECTION AS NEEDED
Status: DISCONTINUED | OUTPATIENT
Start: 2025-02-26 | End: 2025-02-26 | Stop reason: HOSPADM

## 2025-02-26 RX ORDER — ONDANSETRON 2 MG/ML
4 INJECTION INTRAMUSCULAR; INTRAVENOUS ONCE AS NEEDED
Status: CANCELLED | OUTPATIENT
Start: 2025-02-26

## 2025-02-26 RX ADMIN — DROPERIDOL 0.62 MG: 2.5 INJECTION, SOLUTION INTRAMUSCULAR; INTRAVENOUS at 07:13

## 2025-02-26 RX ADMIN — HYDROCODONE BITARTRATE AND ACETAMINOPHEN 1 TABLET: 5; 325 TABLET ORAL at 09:45

## 2025-02-26 RX ADMIN — MIDAZOLAM 0.5 MG: 1 INJECTION INTRAMUSCULAR; INTRAVENOUS at 06:31

## 2025-02-26 RX ADMIN — ACETAMINOPHEN 1000 MG: 1000 INJECTION INTRAVENOUS at 07:13

## 2025-02-26 RX ADMIN — DEXAMETHASONE SODIUM PHOSPHATE 4 MG: 4 INJECTION, SOLUTION INTRAMUSCULAR; INTRAVENOUS at 06:34

## 2025-02-26 RX ADMIN — ROCURONIUM BROMIDE 80 MG: 10 INJECTION, SOLUTION INTRAVENOUS at 07:07

## 2025-02-26 RX ADMIN — PROPOFOL 150 MG: 10 INJECTION, EMULSION INTRAVENOUS at 07:07

## 2025-02-26 RX ADMIN — PREGABALIN 75 MG: 75 CAPSULE ORAL at 05:48

## 2025-02-26 RX ADMIN — DEXAMETHASONE SODIUM PHOSPHATE 8 MG: 4 INJECTION, SOLUTION INTRAMUSCULAR; INTRAVENOUS at 07:13

## 2025-02-26 RX ADMIN — MAGNESIUM SULFATE HEPTAHYDRATE 2 G: 500 INJECTION, SOLUTION INTRAMUSCULAR; INTRAVENOUS at 07:13

## 2025-02-26 RX ADMIN — ROPIVACAINE HYDROCHLORIDE 15 ML: 5 INJECTION EPIDURAL; INFILTRATION; PERINEURAL at 06:34

## 2025-02-26 RX ADMIN — FENTANYL CITRATE 50 MCG: 50 INJECTION, SOLUTION INTRAMUSCULAR; INTRAVENOUS at 07:04

## 2025-02-26 RX ADMIN — CEFAZOLIN 2 G: 2 INJECTION, POWDER, FOR SOLUTION INTRAMUSCULAR; INTRAVENOUS at 06:54

## 2025-02-26 RX ADMIN — MELOXICAM 7.5 MG: 15 TABLET ORAL at 05:48

## 2025-02-26 RX ADMIN — FENTANYL CITRATE 50 MCG: 50 INJECTION, SOLUTION INTRAMUSCULAR; INTRAVENOUS at 06:31

## 2025-02-26 RX ADMIN — HYDROMORPHONE HYDROCHLORIDE 0.5 MG: 1 INJECTION, SOLUTION INTRAMUSCULAR; INTRAVENOUS; SUBCUTANEOUS at 08:48

## 2025-02-26 RX ADMIN — VANCOMYCIN HYDROCHLORIDE 1250 MG: 1.25 INJECTION, POWDER, LYOPHILIZED, FOR SOLUTION INTRAVENOUS at 06:13

## 2025-02-26 RX ADMIN — TRANEXAMIC ACID 1000 MG: 100 INJECTION, SOLUTION INTRAVENOUS at 08:32

## 2025-02-26 RX ADMIN — EPHEDRINE SULFATE 10 MG: 50 INJECTION INTRAVENOUS at 08:52

## 2025-02-26 RX ADMIN — ONDANSETRON 4 MG: 2 INJECTION, SOLUTION INTRAMUSCULAR; INTRAVENOUS at 08:29

## 2025-02-26 RX ADMIN — SUGAMMADEX 300 MG: 100 INJECTION, SOLUTION INTRAVENOUS at 08:45

## 2025-02-26 RX ADMIN — SODIUM CHLORIDE, POTASSIUM CHLORIDE, SODIUM LACTATE AND CALCIUM CHLORIDE 500 ML: 600; 310; 30; 20 INJECTION, SOLUTION INTRAVENOUS at 06:03

## 2025-02-26 RX ADMIN — EPHEDRINE SULFATE 10 MG: 50 INJECTION INTRAVENOUS at 08:37

## 2025-02-26 RX ADMIN — LIDOCAINE HYDROCHLORIDE 100 MG: 20 INJECTION, SOLUTION EPIDURAL; INFILTRATION; INTRACAUDAL; PERINEURAL at 07:07

## 2025-02-26 RX ADMIN — SODIUM CHLORIDE, POTASSIUM CHLORIDE, SODIUM LACTATE AND CALCIUM CHLORIDE: 600; 310; 30; 20 INJECTION, SOLUTION INTRAVENOUS at 07:54

## 2025-02-26 NOTE — OP NOTE
Name: Juli Manzanares    YOB: 1955    DATE OF SURGERY: 2/26/2025    PREOPERATIVE DIAGNOSIS: Right knee end-stage osteoarthritis    POSTOPERATIVE DIAGNOSIS: Right knee end-stage osteoarthritis    PROCEDURE PERFORMED: Right total knee replacement     Surgical Approach: Knee Medial Parapatellar     SURGEON: Demarcus Barbosa M.D.    ASSISTANT: LEONARDO VICENTE    A surgical assistant was integral in ensuring a successful outcome with this procedure.  The assistant was utilized to assist in positioning the patient, draping the patient, was used throughout the case to provide with retraction of tissues, suctioning of blood and body fluids for visualization, positioning of the extremity to allow for proper exposure so that I could perform the procedure.  Without the use of a surgical assistant during this procedure I feel that the outcome may have been compromised or would have been suboptimal or at risk for complications.    IMPLANTS: [employer]:     Implant Name Type Inv. Item Serial No.  Lot No. LRB No. Used Action   CMT BONE PALACOS R HI/VISC 1X40 - [id number] Implant CMT BONE PALACOS R HI/VISC 1X40  [facility] [id number] Right 2 Implanted   DEV CONTRL TISS STRATAFIX SYMM PDS PLUS RAYMOND CT-1 60CM - [id number] Implant DEV CONTRL TISS STRATAFIX SYMM PDS PLUS RAYMOND CT-1 60CM  [employer]  DIV [employer] J AND J [id number] Right 1 Implanted   DEV CONTRL TISS STRATAFIXSPIRALMNCRYL PLSPS2 REV3/0 45CM - [id number] Implant DEV CONTRL TISS STRATAFIXSPIRALMNCRYL PLSPS2 REV3/0 45CM  [employer]  DIV OF J AND J [id number] Right 1 Implanted   BASE TIB/KN GEN2 NONPOR TI SZ4 RT - [id number] Implant BASE TIB/KN GEN2 NONPOR TI SZ4 RT  [employer] [id number] Right 1 Implanted   PAT GEN2 BICONVEX [id number] - [id number] Implant PAT GEN2 BICONVEX [id number]  [employer] [id number] Right 1 Implanted   FEM LEGION OXIN PS NRW SZ4N RT - [id number] Implant FEM LEGION OXIN PS NRW SZ4N RT  [employer] [id number] Right 1  Implanted   INSRT ART/KN LEGION PS HF XLPE SZ3TO4 10MM - [id number] Implant INSRT ART/KN LEGION PS HF XLPE SZ3TO4 10MM  [employer] [id number] Right 1 Implanted       Estimated Blood Loss: 100cc  Specimens : none  Complications: none    DESCRIPTION OF PROCEDURE: The patient was taken to the operating room and placed in the supine position. A sequential compression device was carefully placed on the non-operative leg. Preoperative antibiotics were administered. Surgical time out was performed. After adequate induction of anesthesia, the leg was prepped and draped in the usual sterile fashion, exsanguinated with an Esmarch bandage and the tourniquet inflated to 250 mmHg. A midline incision was performed followed by a medial parapatellar arthrotomy. The patella was subluxed laterally.  A portion of the fat pad, ACL, and anterior horns of the meniscus were excised.  A drill hole was then placed in the center of the femoral canal in line with the canal.  It was irrigated and suctioned.  The intramedullary bao was then placed and a 5 degree distal valgus cut was performed after the block was pinned in place appropriately.  The cut surface was then removed.  The sizing and rotation guide was then placed and seated appropriately.  It was sized and then the drill holes for the 4-in-1 cutting guide were placed in 4.5 degrees of external rotation based off of the posterior condyles, epicondylar axis and Whitesides line..  The 4-in-1 cutting block was then placed and the femoral cuts were performed.  The excess bone was removed and the cut surfaces looked good.  At this point we placed the retractors around the proximal tibia and a slight release of the PCL fibers off of the posterior proximal tibia was performed.  We used the extramedullary tibial alignment guide and it was aligned appropriately and then the depth was set and the block pinned in place.  Proximately 4 mm off the medial side and 7 of the lateral high side as  there was some wear.  The tibial cut was then performed and the alignment guide was removed.  The tibial cut was removed and the cut surface looked good.  The posterior horns of the menisci were then removed as well as the posterior osteophytes.  Flexion extension blocks were then used to check the balance of the knee. The tibial cut surface was then sized with the sizing templates and the tibial and femoral trial were then placed. The knee was placed in full extension and then the tibial tray rotation was then matched to the femoral rotation and marked.    Attention was then placed to the patella. The patella was noted to track centrally through range of motion. The patella was then sized with the trials. The thickness of the patella was then measured. The patella was resurfaced and the surrounding osteophytes were removed. The preoperative thickness was reproduced. The patella tracked centrally through range of motion.  We then checked the balance with the trial implants in place and there was excellent medial lateral and flexion-extension balance.  PCL was questionable thus box was drilled for posterior stabilized component.   At this point all trial components were removed, the knee was copiously irrigated with pulsed lavage, and the knee was injected with anesthetic cocktail solution. The cut surfaces were then dried with clean lap sponges, and the components were cemented tibia, followed by femur, then patella. The knee was held in full extension and all excess cement was removed. The knee was held still until the cement had completely hardened.  The tourniquet was then released. Bleeders were carefully cauterized with cautery.  There was excellent hemostasis.  We then checked the knee again in both flexion and extension with the trial polyethylene spacer in place.  Range of motion was found to be excellent.  Tracking of the patella is midline.  The trial polyethylene spacer was removed and the final  polyethylene was placed.  Again we checked range of motion and stability in both flexion and extension which was found to be excellent with central patellar tracking.   The knee was then copiously irrigated.  We closed the knee in multiple layers in standard fashion. Sterile dressing were applied. At the end of the case, the sponge and needle counts were reported as being correct. There were no known complications. The patient was then transported to the recovery room.      Demarcus Barbosa M.D.

## 2025-02-26 NOTE — ANESTHESIA POSTPROCEDURE EVALUATION
Patient: Juli Manzanares    Procedure Summary       Date: 02/26/25 Room / Location:  [facility] [address] [place] [address] [facility]    Anesthesia Start: 0658 Anesthesia Stop: 0921    Procedure: Right TOTAL KNEE ARTHROPLASTY (Right: Knee) Diagnosis:       Primary osteoarthritis of right knee      (Primary osteoarthritis of right knee [M17.11])    Surgeons: Demarcus Barbosa MD Provider: Damián Elizabeth MD    Anesthesia Type: general ASA Status: 2            Anesthesia Type: general    Vitals  Vitals Value Taken Time   /55 02/26/25 1030   Temp 36.5 °C (97.7 °F) 02/26/25 1030   Pulse 88 02/26/25 1041   Resp 18 02/26/25 1030   SpO2 100 % 02/26/25 1041   Vitals shown include unfiled device data.        Post Anesthesia Care and Evaluation    Patient location during evaluation: bedside  Patient participation: complete - patient participated  Level of consciousness: awake and alert  Pain management: adequate    Airway patency: patent  Anesthetic complications: No anesthetic complications  PONV Status: controlled  Cardiovascular status: blood pressure returned to baseline and acceptable  Respiratory status: acceptable  Hydration status: acceptable

## 2025-02-26 NOTE — ANESTHESIA PROCEDURE NOTES
Peripheral Block    Pre-sedation assessment completed: 2/26/2025 6:31 AM    Patient reassessed immediately prior to procedure    Patient location during procedure: holding area  Start time: 2/26/2025 6:32 AM  Stop time: 2/26/2025 6:34 AM  Reason for block: at surgeon's request and post-op pain management  Performed by  Anesthesiologist: Damián Elizabeth MD  Preanesthetic Checklist  Completed: patient identified, IV checked, site marked, risks and benefits discussed, surgical consent, monitors and equipment checked, pre-op evaluation and timeout performed  Prep:  Pt Position: supine  Sterile barriers:cap, washed/disinfected hands, gloves, mask and alcohol skin prep  Prep: ChloraPrep  Patient monitoring: blood pressure monitoring, continuous pulse oximetry and EKG  Procedure    Sedation: yes  Performed under: local infiltration  Guidance:ultrasound guided    ULTRASOUND INTERPRETATION.  Using ultrasound guidance a 21 G gauge needle was placed in close proximity to the nerve, at which point, under ultrasound guidance anesthetic was injected in the area of the nerve and spread of the anesthesia was seen on ultrasound in close proximity thereto.  There were no abnormalities seen on ultrasound; a digital image was taken; and the patient tolerated the procedure with no complications. Images:still images obtained, printed/placed on chart    Laterality:right  Block Type:adductor canal block  Injection Technique:single-shot  Needle Type:echogenic and Tuohy  Needle Gauge:21 G  Resistance on Injection: none    Medications Used: dexamethasone (DECADRON) injection - Injection   4 mg - 2/26/2025 6:34:00 AM  ropivacaine (NAROPIN) 0.5 % injection - Injection   15 mL - 2/26/2025 6:34:00 AM      Post Assessment  Injection Assessment: negative aspiration for heme, no paresthesia on injection and incremental injection  Patient Tolerance:comfortable throughout block  Complications:no  Additional Notes  Ultrasound guidance used to  visualize nerve anatomy, guide needle placement and verify local anesthetic disbursement.       Performed by: Damián Elizabeth MD

## 2025-02-26 NOTE — THERAPY DISCHARGE NOTE
Patient Name: Juli Manzanares  : 1955    MRN: [id number]                              Today's Date: 2025       Admit Date: 2025    Visit Dx:     ICD-10-CM ICD-9-CM   1. Primary osteoarthritis of right knee  M17.11 715.16     Patient Active Problem List   Diagnosis    Female pattern hair loss    Maxillary sinusitis    Medicare annual wellness visit, subsequent    Chronic low back pain    Need for vaccination    Hypercholesterolemia    Leukocytopenia    Acute bronchitis    Rectal bleeding    Other idiopathic scoliosis, thoracolumbar region    Pars defect of lumbar spine    Family history of colon cancer    Erysipelas    Acute pain of right knee    Primary osteoarthritis of right knee     Past Medical History:   Diagnosis Date    Allergic 1990s    Penicillin, Bactrim, and Carbocaine    Arthritis     Asthma     LAST ATTACK AGE 20'S    DDD (degenerative disc disease), lumbar     Diverticulosis     H/O colonoscopy     Hemorrhoids     History of COVID-19     History of dermatitis     Hyperlipidemia     Knee pain     Low back pain 2021    Pain & discomfort gradually worsened & it is difficult to stand up straight.    Pneumonia 2002    Scoliosis     Discomfort gradually worsened through the years.    Spinal stenosis     Toenail fungus     ADVISED PT TO NOTIFY SURGEON PRIOR TO SURGERY     Past Surgical History:   Procedure Laterality Date    COLONOSCOPY N/A 2017    Procedure: COLONOSCOPY into cecum;  Surgeon: Farida Enriquez MD;  Location: [facility] ENDOSCOPY;  Service:     COLONOSCOPY N/A 2023    Procedure: COLONOSCOPY to Cecum;  Surgeon: Todd Wong MD;  [place] [facility] [place];  Service: Gastroenterology;  Laterality: N/A;  Diverticulosis    DENTAL PROCEDURE      EYE SURGERY  2023    I had cataract surgery & implants in both eyes on 23 & 23    SKIN GRAFT      LOWER MOUTH (LOWER GUM)    WISDOM TOOTH EXTRACTION        General Information       Row Name  02/26/25 1131          Physical Therapy Time and Intention    Document Type discharge evaluation/summary  Pt. is s/p Right TKR  -MS     Mode of Treatment physical therapy;individual therapy  -MS       Row Name 02/26/25 1131          General Information    Patient Profile Reviewed yes  -MS     Prior Level of Function independent:  -MS     Barriers to Rehab none identified  -MS       Row Name 02/26/25 1131          Cognition    Orientation Status (Cognition) oriented x 3  -MS       Row Name 02/26/25 1131          Safety Issues/Impairments Affecting Functional Mobility    Comment, Safety Issues/Impairments (Mobility) Gait belt used for safety.  -MS               User Key  (r) = Recorded By, (t) = Taken By, (c) = Cosigned By      Initials Name Provider Type    Maco Guan, PT Physical Therapist                   Mobility       Row Name 02/26/25 1133          Bed Mobility    Bed Mobility supine-sit;sit-supine  -MS     Comment, (Bed Mobility) Up in chair this AM.  -MS       Row Name 02/26/25 1133          Sit-Stand Transfer    Sit-Stand [place] (Transfers) independent  -MS     Assistive Device (Sit-Stand Transfers) walker, front-wheeled  -MS       Row Name 02/26/25 1133          Gait/Stairs (Locomotion)    [place] Level (Gait) standby assist  -MS     Assistive Device (Gait) walker, front-wheeled  -MS     Distance in Feet (Gait) 120  -MS     [place] Level (Stairs) stand by assist  -MS     Handrail Location (Stairs) left side (ascending)  -MS     Number of Steps (Stairs) 3  -MS     Ascending Technique (Stairs) step-to-step  -MS     Descending Technique (Stairs) step-to-step  -MS       Row Name 02/26/25 1133          Mobility    Extremity Weight-bearing Status right lower extremity  -MS     Right Lower Extremity (Weight-bearing Status) weight-bearing as tolerated (WBAT)  -MS               User Key  (r) = Recorded By, (t) = Taken By, (c) = Cosigned By      Initials Name Provider Type    MS Ulloa  Maco LANDIS, PT Physical Therapist                   Obj/Interventions       Row Name 02/26/25 1133          Range of Motion Comprehensive    Comment, General Range of Motion BUE/LLE (WFL's)  -MS       Row Name 02/26/25 1133          Strength Comprehensive (MMT)    Comment, General Manual Muscle Testing (MMT) Assessment BUE/LLE (>/=3/5)  -MS       Row Name 02/26/25 1133          Motor Skills    Therapeutic Exercise --  Right TKR ther. ex. program x 10 reps completed  -MS               User Key  (r) = Recorded By, (t) = Taken By, (c) = Cosigned By      Initials Name Provider Type    Maco Guan, PT Physical Therapist                   Goals/Plan    No documentation.                  Clinical Impression       Row Name 02/26/25 1134          Pain    Pretreatment Pain Rating 2/10  -MS     Posttreatment Pain Rating 2/10  -MS     Pain Location knee  -MS     Pain Side/Orientation right  -MS       Row Name 02/26/25 1134          Plan of Care Review    Plan of Care Reviewed With patient;family  -MS       Row Name 02/26/25 1134          Positioning and Restraints    Pre-Treatment Position sitting in chair/recliner  -MS     Post Treatment Position chair  -MS     In Chair notified nsg;reclined;sitting;call light within reach;encouraged to call for assist;with family/caregiver  Ice pack to Right knee  -MS               User Key  (r) = Recorded By, (t) = Taken By, (c) = Cosigned By      Initials Name Provider Type    Maco Guan, PT Physical Therapist                   Outcome Measures       Row Name 02/26/25 1135          How much help from another person do you currently need...    Turning from your back to your side while in flat bed without using bedrails? 4  -MS     Moving from lying on back to sitting on the side of a flat bed without bedrails? 4  -MS     Moving to and from a bed to a chair (including a wheelchair)? 4  -MS     Standing up from a chair using your arms (e.g., wheelchair, bedside chair)? 4  -MS      Climbing 3-5 steps with a railing? 3  -MS     To walk in hospital room? 3  -MS     AM-PAC 6 Clicks Score (PT) 22  -MS     Highest Level of Mobility Goal 7 --> Walk 25 feet or more  -MS       Row Name 02/26/25 1135          Functional Assessment    Outcome Measure Options AM-PAC 6 Clicks Basic Mobility (PT)  -MS               User Key  (r) = Recorded By, (t) = Taken By, (c) = Cosigned By      Initials Name Provider Type    MS Maco Ulloa, PT Physical Therapist                  Physical Therapy Education       Title: PT OT SLP Therapies (Done)       Topic: Physical Therapy (Done)       Point: Mobility training (Done)       Learning Progress Summary            Patient Acceptance, E,D, VU,NR by MS at 2/26/2025 1135                      Point: Home exercise program (Done)       Learning Progress Summary            Patient Acceptance, E,D, VU,NR by MS at 2/26/2025 1135                      Point: Body mechanics (Done)       Learning Progress Summary            Patient Acceptance, E,D, VU,NR by MS at 2/26/2025 1135                      Point: Precautions (Done)       Learning Progress Summary            Patient Acceptance, E,D, VU,NR by MS at 2/26/2025 1135                                      User Key       Initials Effective Dates Name Provider Type Discipline    MS 06/16/21 -  Maco Ulloa, PT Physical Therapist PT                  PT Recommendation and Plan     Outcome Evaluation: Pt. is currently independent/SBA with functional mobility and has no further questions/concerns regarding functional mobility or home safety.  Encouraged pt. to continue ther. ex. program 2-3 x's daily and to ambulate every 1-2 hours once home. Plan for discharge home this date.  Will sign off.     Time Calculation:         PT Charges       Row Name 02/26/25 1136             Time Calculation    Start Time 1053  -MS      Stop Time 1112  -MS      Time Calculation (min) 19 min  -MS      PT Received On 02/26/25  -MS         Time  Calculation- PT    Total Timed Code Minutes- PT 18 minute(s)  -MS                User Key  (r) = Recorded By, (t) = Taken By, (c) = Cosigned By      Initials Name Provider Type    Maco Guan, PT Physical Therapist                  Therapy Charges for Today       Code Description Service Date Service Provider Modifiers Qty    07173169679 HC PT EVAL LOW COMPLEXITY 2 2/26/2025 Maco Ulloa, PT GP 1    38611010234 HC PT THER PROC EA 15 MIN 2/26/2025 Maco Ulloa, PT GP 1            PT G-Codes  Outcome Measure Options: AM-PAC 6 Clicks Basic Mobility (PT)  AM-PAC 6 Clicks Score (PT): 22    PT Discharge Summary  Anticipated Discharge Disposition (PT): home with assist, home with home health  Reason for Discharge: Discharge from facility  Discharge Destination: Home with assist, Home with home health    Maco Ulloa, PT  2/26/2025

## 2025-02-26 NOTE — ANESTHESIA PROCEDURE NOTES
Airway  Urgency: elective    Date/Time: 2/26/2025 7:11 AM  Airway not difficult    General Information and Staff    Patient location during procedure: OR  Anesthesiologist: Damián Elizabeth MD  CRNA/CAA: Chela Ha CRNA    Indications and Patient Condition  Indications for airway management: airway protection    Preoxygenated: yes  MILS maintained throughout  Mask difficulty assessment: 2 - vent by mask + OA or adjuvant +/- NMBA    Final Airway Details  Final airway type: endotracheal airway      Successful airway: ETT  Cuffed: yes   Successful intubation technique: direct laryngoscopy  Facilitating devices/methods: intubating stylet and cricoid pressure  Endotracheal tube insertion site: oral  Blade: Ashley  Blade size: 2  ETT size (mm): 7.0  Cormack-Lehane Classification: grade I - full view of glottis  Placement verified by: chest auscultation and capnometry   Cuff volume (mL): 7  Measured from: teeth  ETT/EBT  to teeth (cm): 20  Number of attempts at approach: 1  Assessment: lips, teeth, and gum same as pre-op and atraumatic intubation

## 2025-02-26 NOTE — ANESTHESIA PREPROCEDURE EVALUATION
Anesthesia Evaluation     Patient summary reviewed and Nursing notes reviewed   no history of anesthetic complications:   NPO Solid Status: > 8 hours  NPO Liquid Status: > 2 hours           Airway   Mallampati: II  TM distance: >3 FB  Neck ROM: full  Dental      Pulmonary    (+) asthma,  Cardiovascular     ECG reviewed    (+) hyperlipidemia      Neuro/Psych  GI/Hepatic/Renal/Endo      Musculoskeletal     Abdominal    Substance History      OB/GYN          Other   arthritis,                       Anesthesia Plan    ASA 2     general     intravenous induction     Anesthetic plan, risks, benefits, and alternatives have been provided, discussed and informed consent has been obtained with: patient.        CODE STATUS:

## 2025-02-27 ENCOUNTER — TELEPHONE (OUTPATIENT)
Dept: ORTHOPEDIC SURGERY | Facility: HOSPITAL | Age: 70
End: 2025-02-27
Payer: MEDICARE

## 2025-02-27 ENCOUNTER — TELEPHONE (OUTPATIENT)
Dept: ORTHOPEDIC SURGERY | Facility: CLINIC | Age: 70
End: 2025-02-27

## 2025-02-27 NOTE — TELEPHONE ENCOUNTER
Ms. Manzanares called me and left a message regarding her dressing. Call was returned at this time. Pt was advised to remove the ace wrap and white covering but the leave the ANDREA dressing in place until her F/U with Dr. Barbosa.  She also had a question regarding her pain medication. She said the block has worn off and she is having a lot more pain. She said they told her she could have 1-2 every 4-6 hours. She had been taking one every 4 but wanted to know if she could take two. Told her that was fine, until her pain was under control and then to drop it back to 1. She was concerned about nausea. We discussed the prescription for Zofran as well. Ms. Manzanares voiced understanding. She doesn't have any other questions for me at this time. Ms. Manzanares has my contact information should she need anything.

## 2025-02-27 NOTE — TELEPHONE ENCOUNTER
Hub staff attempted to follow warm transfer process and was unsuccessful     Caller: Naga Manzanares    Relationship to patient: Emergency Contact    Best call back number: [phone]    Patient is needing: PATIENT'S  CALLED WANTING TO KNOW IF HE IS SUPPOSED TO CHANGE THE PATIENTS BANDAGE. HE CANNOT FIND ANY PAPERWORK THAT GIVES HIM INSTRUCTION.   HE BELIEVES HE WAS TOLD TO TAKE THE ACE BANDAGE OFF AND LEAVE THE REST BUT HE CAN'T REMEMBER   PLEASE ADVISE   PATIENT HAD SX 2/26/25 WITH ZACHARY

## 2025-02-28 ENCOUNTER — TELEPHONE (OUTPATIENT)
Dept: ORTHOPEDIC SURGERY | Facility: HOSPITAL | Age: 70
End: 2025-02-28
Payer: MEDICARE

## 2025-02-28 DIAGNOSIS — M17.11 PRIMARY OSTEOARTHRITIS OF RIGHT KNEE: ICD-10-CM

## 2025-02-28 RX ORDER — HYDROCODONE BITARTRATE AND ACETAMINOPHEN 7.5; 325 MG/1; MG/1
1 TABLET ORAL EVERY 4 HOURS PRN
Qty: 28 TABLET | Refills: 0 | Status: SHIPPED | OUTPATIENT
Start: 2025-02-28

## 2025-02-28 NOTE — TELEPHONE ENCOUNTER
Have contacted the patient to make her aware that her prescription had been sent in.  I was unable to reach her but I did leave a message on her voicemail regarding her prescription

## 2025-02-28 NOTE — TELEPHONE ENCOUNTER
Ms. Manzanares called me at this time. She said she is doing really well, but she has had some issues with pain control. She had been taking 2 every 4 hours and now has enough pills to get her through tomorrow. She was wanting a refill on her pain medication.     Hydrocodone 7.5/325mg  Last Fill: 2/26  Surgery 2/26  Next appt: 3/13  Pharmacy: [facility]  [phone]

## 2025-03-06 DIAGNOSIS — M17.11 PRIMARY OSTEOARTHRITIS OF RIGHT KNEE: ICD-10-CM

## 2025-03-06 RX ORDER — HYDROCODONE BITARTRATE AND ACETAMINOPHEN 7.5; 325 MG/1; MG/1
1 TABLET ORAL EVERY 6 HOURS PRN
Qty: 28 TABLET | Refills: 0 | Status: SHIPPED | OUTPATIENT
Start: 2025-03-06

## 2025-03-06 NOTE — TELEPHONE ENCOUNTER
Caller: Juli Manzanares    Relationship: Self    Best call back number: [phone]    Requested Prescriptions:   Requested Prescriptions     Pending Prescriptions Disp Refills    HYDROcodone-acetaminophen (NORCO) 7.5-325 MG per tablet 28 tablet 0     Sig: Take 1 tablet by mouth Every 4 (Four) Hours As Needed for Mild Pain or Moderate Pain. Take 1 tablet every 4 hours for mild to moderate pain May take 2 every 4 hours for severe pain.        Pharmacy where request should be sent:  Pharmacy:   [facility]/PHARMACY #[id number] [facility] [address] [facility]. AT [facility] [phone] [facility] [phone] FX     Last office visit with prescribing clinician: 2/20/2025   Last telemedicine visit with prescribing clinician: Visit date not found   Next office visit with prescribing clinician: 3/13/2025     Additional details provided by patient: N/A     Does the patient have less than a 3 day supply:  [x] Yes  [] No    Would you like a call back once the refill request has been completed: [] Yes [x] No    If the office needs to give you a call back, can they leave a voicemail: [x] Yes [] No    Efra Cha Rep   03/06/25 10:21 EST

## 2025-03-13 ENCOUNTER — OFFICE VISIT (OUTPATIENT)
Dept: ORTHOPEDIC SURGERY | Facility: CLINIC | Age: 70
End: 2025-03-13
Payer: MEDICARE

## 2025-03-13 VITALS — TEMPERATURE: 97.8 F | WEIGHT: 170.7 LBS | BODY MASS INDEX: 26.79 KG/M2 | HEIGHT: 67 IN

## 2025-03-13 DIAGNOSIS — Z96.651 S/P TKR (TOTAL KNEE REPLACEMENT), RIGHT: Primary | ICD-10-CM

## 2025-03-13 DIAGNOSIS — M17.11 PRIMARY OSTEOARTHRITIS OF RIGHT KNEE: ICD-10-CM

## 2025-03-13 PROCEDURE — 1160F RVW MEDS BY RX/DR IN RCRD: CPT | Performed by: ORTHOPAEDIC SURGERY

## 2025-03-13 PROCEDURE — 99024 POSTOP FOLLOW-UP VISIT: CPT | Performed by: ORTHOPAEDIC SURGERY

## 2025-03-13 PROCEDURE — 1159F MED LIST DOCD IN RCRD: CPT | Performed by: ORTHOPAEDIC SURGERY

## 2025-03-13 RX ORDER — HYDROCODONE BITARTRATE AND ACETAMINOPHEN 7.5; 325 MG/1; MG/1
1 TABLET ORAL EVERY 6 HOURS PRN
Qty: 20 TABLET | Refills: 0 | Status: SHIPPED | OUTPATIENT
Start: 2025-03-13

## 2025-03-13 NOTE — PROGRESS NOTES
Juli Manzanares : 1955 MRN: [id number] DATE: 3/13/2025    DIAGNOSIS: 2 week follow up right total knee      SUBJECTIVE:Patient returns today for 2 week follow up of right  total knee replacement. Patient reports doing well with no unusual complaints. Appears to be progressing appropriately.    OBJECTIVE:   Exam:. The incision is healing appropriately. No sign of infection. Range of motion is progressing as expected. The calf is soft and nontender with a negative Homans sign. 0-90    ASSESSMENT: 2 week status post right  knee replacement.    PLAN: 1) Dressing removed and steri strips applied   2)  PT (outpatient)    6-week goals active range of motion 0-120.  Full extension is most important motion.  Walk without a limp or device.  Stairs with a reciprocal pattern and no railing assist to ascend.  Rail to use okay to descend.  Single leg stance for more than 10 seconds.  Stand from a 17 inch chair without upper extremity assistance.  No extension lag with straight leg raise.    For physical therapy no resistance on cardio machines for 3 months.  No weight machines for 3 months.  No treadmill for 6 weeks.  No cuff weights greater than 2 pounds in the initial 6 weeks postoperatively.  No more than 5 pounds in the first 3 months.       3) Continue ice PRN   4) aspirin 81 mg orally every day for 1 month   5) Follow up in 4 weeks with repeat Xrays of right  knee (3views)    Demarcus Barbosa MD  3/13/2025   Answers submitted by the patient for this visit:  Post Operative Visit (Submitted on 3/6/2025)  Chief Complaint: Follow-up  Pain Control: controlled  Fever: no fever  Diet: adequate intake  Activity: normal with assistance  Operative Site Issues: No

## 2025-03-25 ENCOUNTER — TELEPHONE (OUTPATIENT)
Dept: ORTHOPEDIC SURGERY | Facility: HOSPITAL | Age: 70
End: 2025-03-25
Payer: MEDICARE

## 2025-03-25 NOTE — TELEPHONE ENCOUNTER
Ms. Manzanares called  me at this time. She had some general questions she wanted to discuss. Ms. Manzanares had a RTK 2/26. She said things are going well with her knee and rehab at this time. Questions addressed at this time. Ms. Manzanares doesn't have any other concerns. She has my contact information should she need anything.

## 2025-04-16 RX ORDER — ROSUVASTATIN CALCIUM 10 MG/1
10 TABLET, COATED ORAL DAILY
Qty: 90 TABLET | Refills: 0 | Status: SHIPPED | OUTPATIENT
Start: 2025-04-16

## 2025-04-18 ENCOUNTER — OFFICE VISIT (OUTPATIENT)
Dept: ORTHOPEDIC SURGERY | Facility: CLINIC | Age: 70
End: 2025-04-18
Payer: MEDICARE

## 2025-04-18 VITALS — HEIGHT: 67 IN | WEIGHT: 161.8 LBS | BODY MASS INDEX: 25.39 KG/M2 | TEMPERATURE: 98.6 F

## 2025-04-18 DIAGNOSIS — Z96.651 S/P TKR (TOTAL KNEE REPLACEMENT), RIGHT: Primary | ICD-10-CM

## 2025-04-18 DIAGNOSIS — R52 PAIN: ICD-10-CM

## 2025-04-18 PROCEDURE — 99024 POSTOP FOLLOW-UP VISIT: CPT | Performed by: ORTHOPAEDIC SURGERY

## 2025-04-18 NOTE — PROGRESS NOTES
Juli Manzanares : 1955 MRN: [id number] DATE: 2025    DIAGNOSIS: 8 week follow up right total knee      SUBJECTIVE:Patient returns today for 8 week follow up of right  total knee replacement. Patient reports doing well with no unusual complaints. Appears to be progressing appropriately. Using a cane some mainly when out.    OBJECTIVE:   Exam:. The incision is well healed. No sign of infection. Range of motion is measured at 0 to 110. The calf is soft and nontender with a negative Homans sign. Strength is progressing and the patient is ambulating appropriately.    DIAGNOSTIC STUDIES  Xrays: 3 views of the right  knee (AP, lateral, and sunrise) were ordered and reviewed for evaluation of recent knee replacement. They demonstrate a well positioned, well aligned knee replacement without complicating factors noted. In comparison with previous films there has been no change.    ASSESSMENT: 8 week status post left knee replacement.    PLAN: 1) Continue with PT exercises as prescribed   2) D/C DVT ppx per ortho   3) Follow up in 6 weeks    Antibiotics before dental and GI procedures discussed.     Demarcus Barbosa MD  2025   Answers submitted by the patient for this visit:  Post Operative Visit (Submitted on 2025)  Chief Complaint: Follow-up  Pain Control: no pain  Fever: no fever  Diet: adequate intake  Activity: returning to normal  Operative Site Issues: No  Additional information: I am able to walk without a walker inside our home.  I do use a walker when walking  30 to 35 minutes outside at the park now (as of 25).   I am also going to PT twice weekly.

## 2025-05-30 ENCOUNTER — OFFICE VISIT (OUTPATIENT)
Dept: ORTHOPEDIC SURGERY | Facility: CLINIC | Age: 70
End: 2025-05-30
Payer: MEDICARE

## 2025-05-30 VITALS — TEMPERATURE: 98.2 F | WEIGHT: 167.6 LBS | HEIGHT: 67 IN | BODY MASS INDEX: 26.3 KG/M2

## 2025-05-30 DIAGNOSIS — Z96.651 S/P TKR (TOTAL KNEE REPLACEMENT), RIGHT: Primary | ICD-10-CM

## 2025-05-30 RX ORDER — CEPHALEXIN 500 MG/1
CAPSULE ORAL
COMMUNITY
Start: 2025-05-14

## 2025-05-30 RX ORDER — CHLORHEXIDINE GLUCONATE ORAL RINSE 1.2 MG/ML
SOLUTION DENTAL
COMMUNITY
Start: 2025-05-21

## 2025-05-30 NOTE — PROGRESS NOTES
"Patient: Juli Manzanares  YOB: 1955 69 y.o. female  Medical Record Number: [id number]    Chief Complaints:   Chief Complaint   Patient presents with    Right Knee - Follow-up       History of Present Illness:Juli Manzanares is a 69 y.o. female who presents for follow-up of right total knee replacement.  Patient is just over 3 months out.  Working well with therapy overall continues to improve no complaints.    Allergies:   Allergies   Allergen Reactions    Bactrim [Sulfamethoxazole-Trimethoprim] Itching and Rash    Carbocaine [Mepivacaine Hcl] Unknown - Low Severity     \"PASSED OUT\" (OK WITH LIDOCAINE)    Novocain [Procaine] Unknown - Low Severity     \"PASSED OUT/(\"OK WITH LIDOCAINE)    Penicillins Unknown - Low Severity     \"CHILDHOOD REACTION\"    Clindamycin Rash     Rash and swelling on face    Latex Rash       Medications:   Current Outpatient Medications   Medication Sig Dispense Refill    BIOTIN PO Take 10,000 mcg by mouth Daily. PT HOLDING FOR SURGERY      Calcium-Magnesium-Vitamin D (CALCIUM 1200+D3 PO) Take 2 tablets by mouth Daily.      cephalexin (KEFLEX) 500 MG capsule TAKE 4 CAPS BY MOUTH 1 HOUR PRIOR TO SURGERY AND THEN TAKE 1 CAPSULE EVERY 6 HRS UNTIL GONE      chlorhexidine (PERIDEX) 0.12 % solution SWISH AND SPIT 5ML'S 3X DAILY      Cholecalciferol 25 MCG (1000 UT) tablet Take 1 tablet by mouth Daily.      clobetasol (TEMOVATE) 0.05 % cream Apply 1 Application topically to the appropriate area as directed As Needed.      meloxicam (MOBIC) 7.5 MG tablet Take 1 tablet by mouth Daily with food. 7 tablet 0    rosuvastatin (CRESTOR) 10 MG tablet TAKE 1 TABLET BY MOUTH EVERY DAY 90 tablet 0    Wheat Dextrin (BENEFIBER DRINK MIX PO) Take 1 teaspoon(s) by mouth Every Morning.       No current facility-administered medications for this visit.         The following portions of the patient's history were reviewed and updated as appropriate: allergies, current medications, past family " "history, past medical history, past social history, past surgical history and problem list.    Review of Systems:   A 14 point review of systems was performed. All systems negative except pertinent positives/negative listed in HPI above    Physical Exam:   Vitals:    05/30/25 1008   Temp: 98.2 °F (36.8 °C)   TempSrc: Temporal   Weight: 76 kg (167 lb 9.6 oz)   Height: 170.2 cm (67\")   PainSc: 0-No pain   PainLoc: Knee       General: A and O x 3, ASA, NAD    SCLERA:    Normal    DENTITION:   Normal    Right knee exam incision healing quite well knee range of motion 0 to 120 degrees.  Knee stable varus valgus stress.      Radiology:      3 views right knee taken reviewed show status post total knee replacement implants in satisfactory position no acute complications or other significant changes from previous    Assessment/Plan:    S/p right total knee replacement      Patient is doing quite well encouraged her to continue to finish out her therapy working on knee strengthening and maintain range of motion.  We discussed over-the-counter medication for symptom management as needed for pain or soreness continue to ice as needed particular after therapy.  Antibiotics prior to dental cleaning or procedures.  This time we will plan to see her back at 1 year anniversary of surgery all questions answered patient understands and agrees.    Disclaimer: Please note that areas of this note were completed with computer voice recognition software.  Quite often unanticipated grammatical, syntax, homophones, and other interpretive errors are inadvertently transcribed by the computer software. Please excuse any errors that have escaped final proofreading.    Demarcus Barbosa MD    "

## 2025-06-30 RX ORDER — CEPHALEXIN 500 MG/1
CAPSULE ORAL
Qty: 4 CAPSULE | Refills: 3 | Status: SHIPPED | OUTPATIENT
Start: 2025-06-30

## 2025-07-14 ENCOUNTER — TELEPHONE (OUTPATIENT)
Dept: ORTHOPEDIC SURGERY | Facility: CLINIC | Age: 70
End: 2025-07-14

## 2025-07-14 RX ORDER — ROSUVASTATIN CALCIUM 10 MG/1
10 TABLET, COATED ORAL DAILY
Qty: 90 TABLET | Refills: 1 | Status: SHIPPED | OUTPATIENT
Start: 2025-07-14

## 2025-08-18 RX ORDER — CEPHALEXIN 500 MG/1
CAPSULE ORAL
Qty: 4 CAPSULE | Refills: 3 | Status: SHIPPED | OUTPATIENT
Start: 2025-08-18 | End: 2025-08-25 | Stop reason: SDUPTHER

## 2025-08-25 DIAGNOSIS — Z96.651 S/P TKR (TOTAL KNEE REPLACEMENT), RIGHT: Primary | ICD-10-CM

## 2025-08-25 RX ORDER — CEPHALEXIN 500 MG/1
CAPSULE ORAL
Qty: 4 CAPSULE | Refills: 1 | Status: SHIPPED | OUTPATIENT
Start: 2025-08-25

## 2025-08-26 ENCOUNTER — OFFICE VISIT (OUTPATIENT)
Dept: FAMILY MEDICINE CLINIC | Facility: CLINIC | Age: 70
End: 2025-08-26
Payer: MEDICARE

## 2025-08-26 VITALS
OXYGEN SATURATION: 100 % | WEIGHT: 171 LBS | HEIGHT: 67 IN | RESPIRATION RATE: 16 BRPM | DIASTOLIC BLOOD PRESSURE: 72 MMHG | BODY MASS INDEX: 26.84 KG/M2 | SYSTOLIC BLOOD PRESSURE: 112 MMHG | HEART RATE: 62 BPM

## 2025-08-26 DIAGNOSIS — Z00.00 MEDICARE ANNUAL WELLNESS VISIT, SUBSEQUENT: ICD-10-CM

## 2025-08-26 DIAGNOSIS — M41.25 OTHER IDIOPATHIC SCOLIOSIS, THORACOLUMBAR REGION: Primary | ICD-10-CM

## 2025-08-26 DIAGNOSIS — E78.00 HYPERCHOLESTEROLEMIA: ICD-10-CM

## 2025-08-27 ENCOUNTER — OFFICE VISIT (OUTPATIENT)
Dept: NEUROSURGERY | Facility: CLINIC | Age: 70
End: 2025-08-27
Payer: MEDICARE

## 2025-08-27 VITALS
DIASTOLIC BLOOD PRESSURE: 70 MMHG | TEMPERATURE: 97.3 F | HEIGHT: 67 IN | OXYGEN SATURATION: 100 % | SYSTOLIC BLOOD PRESSURE: 114 MMHG | WEIGHT: 170 LBS | BODY MASS INDEX: 26.68 KG/M2 | HEART RATE: 85 BPM

## 2025-08-27 DIAGNOSIS — M54.50 CHRONIC BILATERAL LOW BACK PAIN WITHOUT SCIATICA: ICD-10-CM

## 2025-08-27 DIAGNOSIS — G89.29 CHRONIC BILATERAL LOW BACK PAIN WITHOUT SCIATICA: ICD-10-CM

## 2025-08-27 DIAGNOSIS — M25.552 LEFT HIP PAIN: Primary | ICD-10-CM

## (undated) DEVICE — Device: Brand: XPERIENCE

## (undated) DEVICE — SYS IRR SURGIPHOR A/MIC RTU BO PVPI 450ML STRL

## (undated) DEVICE — DUAL CUT SAGITTAL BLADE

## (undated) DEVICE — NDL SPINE 20G 3 1/2 YEL STRL 1P/U

## (undated) DEVICE — JACKT LAB F/R KNIT CUFF/COLR XLG BLU

## (undated) DEVICE — CANN O2 ETCO2 FITS ALL CONN CO2 SMPL A/ 7IN DISP LF

## (undated) DEVICE — YANKAUER,BULB TIP,W/O VENT,RIGID,STERILE: Brand: MEDLINE

## (undated) DEVICE — ADAPT CLN SCPE ENDO PORPOISE BX/50 DISP

## (undated) DEVICE — SUT VIC 0 CT1 CR8 27IN UD VCPP41D

## (undated) DEVICE — GLV SURG BIOGEL LTX PF 8

## (undated) DEVICE — INTENDED TO SUPPORT AND MAINTAIN THE POSITION OF AN ANESTHETIZED PATIENT DURING SURGERY: Brand: HERMANTOR XL PINK KNEE POSITIONING PAD

## (undated) DEVICE — PREP SOL POVIDONE/IODINE BT 4OZ

## (undated) DEVICE — THE STERILE LIGHT HANDLE COVER IS USED WITH STERIS SURGICAL LIGHTING AND VISUALIZATION SYSTEMS.

## (undated) DEVICE — TUBING, SUCTION, 1/4" X 10', STRAIGHT: Brand: MEDLINE

## (undated) DEVICE — Device

## (undated) DEVICE — SKIN PREP TRAY 4 COMPARTM TRAY: Brand: MEDLINE INDUSTRIES, INC.

## (undated) DEVICE — PK KN TOTL 40

## (undated) DEVICE — 450 ML BOTTLE OF 0.05% CHLORHEXIDINE GLUCONATE IN 99.95% STERILE WATER FOR IRRIGATION, USP AND APPLICATOR.: Brand: IRRISEPT ANTIMICROBIAL WOUND LAVAGE

## (undated) DEVICE — 3M™ IOBAN™ 2 ANTIMICROBIAL INCISE DRAPE 6640EZ: Brand: IOBAN™ 2

## (undated) DEVICE — BNDG,ELSTC,MATRIX,STRL,6"X5YD,LF,HOOK&LP: Brand: MEDLINE

## (undated) DEVICE — SOL NACL 0.9PCT 1000ML

## (undated) DEVICE — PATIENT RETURN ELECTRODE, SINGLE-USE, CONTACT QUALITY MONITORING, ADULT, WITH 9FT CORD, FOR PATIENTS WEIGING OVER 33LBS. (15KG): Brand: MEGADYNE

## (undated) DEVICE — GLV SURG SENSICARE PI LF PF 8 GRN STRL

## (undated) DEVICE — APPL DURAPREP IODOPHOR APL 26ML

## (undated) DEVICE — Device: Brand: DEFENDO AIR/WATER/SUCTION AND BIOPSY VALVE

## (undated) DEVICE — DRSNG SURESITE WNDW 2.38X2.75

## (undated) DEVICE — FLEX ADVANTAGE 1500CC: Brand: FLEX ADVANTAGE

## (undated) DEVICE — MAT FLR ABSORBENT LG 4FT 10 2.5FT

## (undated) DEVICE — THE TORRENT IRRIGATION SCOPE CONNECTOR IS USED WITH THE TORRENT IRRIGATION TUBING TO PROVIDE IRRIGATION FLUIDS SUCH AS STERILE WATER DURING GASTROINTESTINAL ENDOSCOPIC PROCEDURES WHEN USED IN CONJUNCTION WITH AN IRRIGATION PUMP (OR ELECTROSURGICAL UNIT).: Brand: TORRENT

## (undated) DEVICE — SPNG GZ WOVN 4X4IN 12PLY 10/BX STRL

## (undated) DEVICE — ANTIBACTERIAL UNDYED BRAIDED (POLYGLACTIN 910), SYNTHETIC ABSORBABLE SUTURE: Brand: COATED VICRYL

## (undated) DEVICE — CANN NASL CO2 TRULINK W/O2 A/

## (undated) DEVICE — SYS SKIN EXOFIN WND CLS 4X22CM

## (undated) DEVICE — TRAP FLD MINIVAC MEGADYNE 100ML

## (undated) DEVICE — KT ORCA ORCAPOD DISP STRL

## (undated) DEVICE — BLD DEBAKY BEAVER MAMMATOME 8MM

## (undated) DEVICE — CEMENT MIXING SYSTEM WITH FEMORAL BREAKWAY NOZZLE: Brand: REVOLUTION

## (undated) DEVICE — UNDERCAST PADDING: Brand: DEROYAL

## (undated) DEVICE — SOL ISO/ALC 70PCT 4OZ